# Patient Record
Sex: FEMALE | Race: WHITE | NOT HISPANIC OR LATINO | Employment: FULL TIME | URBAN - METROPOLITAN AREA
[De-identification: names, ages, dates, MRNs, and addresses within clinical notes are randomized per-mention and may not be internally consistent; named-entity substitution may affect disease eponyms.]

---

## 2017-01-24 ENCOUNTER — OFFICE VISIT (OUTPATIENT)
Dept: ENDOCRINOLOGY | Age: 67
End: 2017-01-24

## 2017-01-24 VITALS
DIASTOLIC BLOOD PRESSURE: 72 MMHG | OXYGEN SATURATION: 94 % | WEIGHT: 247 LBS | SYSTOLIC BLOOD PRESSURE: 130 MMHG | HEIGHT: 63 IN | HEART RATE: 83 BPM | BODY MASS INDEX: 43.77 KG/M2

## 2017-01-24 DIAGNOSIS — R63.5 ABNORMAL WEIGHT GAIN: ICD-10-CM

## 2017-01-24 DIAGNOSIS — E04.1 THYROID NODULE: ICD-10-CM

## 2017-01-24 DIAGNOSIS — R53.82 CHRONIC FATIGUE: ICD-10-CM

## 2017-01-24 DIAGNOSIS — E05.90 HYPERTHYROIDISM: Primary | ICD-10-CM

## 2017-01-24 PROCEDURE — 99214 OFFICE O/P EST MOD 30 MIN: CPT | Performed by: INTERNAL MEDICINE

## 2017-01-24 RX ORDER — METOPROLOL TARTRATE 50 MG/1
50 TABLET, FILM COATED ORAL 2 TIMES DAILY
COMMUNITY
Start: 2016-12-28

## 2017-01-24 NOTE — PROGRESS NOTES
66 y.o.    Patient Care Team:  KAE Leroy as PCP - General (Physician Assistant)    Chief Complaint:      F/U HYPERTHYROIDISM.  NO RECENT LABS.      Subjective     HPI  Patient is a 66-year-old white female with a history of hyperthyroidism and thyroid nodule and uncontrolled type 2 diabetes mellitus came for follow-up  Patient is currently taking Tapazole 5 mg daily in the morning.  She denies any side effects.  She reports compliance with the medication.  Uncontrolled type 2 diabetes mellitus  Patient did not bring her Accu-Chek log today but her meter suggest that her 7 day average was 151 and 30 day average was 162.  She is currently taking Lantus 34 units twice daily and Glucovance 2 tablets twice daily.  Abnormal weight gain  Patient actually lost about 6 pounds since September.  She currently weighs 247 pounds with a BMI of 43.8  History of atrial fibrillation currently has chronology follow-up  Chronic fatigue  Patient has been expressing chronic fatigue for a long time.  She reports that her fatigue is getting worse  Type 2 diabetes mellitus uncontrolled with neuropathy.  Patient reports bilateral carpal tunnel symptoms which could be from diabetes and also obesity and abnormal thyroid status    Interval History  patient is a 65-year-old white female with a history of hyperthyroidism came for follow-up  Summary  Patient was initially seen by me at the [facility] when she was admitted with tachycardia and abnormal thyroid function testing. Her TSH was suppressed <0.005 and T4 was elevated at 17. Initial evaluation suggested that she had moderate sized goiter with one single nodule in the left lobe and she was initiated on Tapazole 20 mg 3 times a day prior to discharge  Patient reports several symptoms suggestive of hyperthyroidism  Fatigue  Since starting medication patient reports that she is slightly feeling better. She is able to climb a full step of stairs without feeling shortness  of breath or exhaustion.  Atrial fibrillation  Patient is currently managed by cardiology and is under control.  She is apparently using an event recorder which started just for the past 3 days for 1 month. She of course denies any palpitations.  Her energy level has increased her activity level has also increased pressure continues to feel fatigue occasionally. She denied any tremors  She denied any side effects from medication.  Thyroiditis was suspected but her thyroid peroxidase antibodies were negative during the hospitalization.  patient also has history of type 2 diabetes mellitus which appear to be in good control with an A1c of 6.4%  Patient continues to feel symptoms of heat intolerance and sweating and leg cramps. She also reports worsening fatigue for the past few months.   Hyperthyroidism: The patient is being seen for follow-up of hyperthyroidism. Disease type: undetermined. Current treatment includes methimazole. Symptoms: fatigue, heat intolerance and increased perspiration, but no palpitations, no weight loss, no weight gain, no cold intolerance, no tremor, no irritability, no diarrhea, no constipation, no pruritus and no peripheral edema.           Patient is also known diabetic and has uncontrolled type 2 diabetes mellitus.  She does not check her blood sugars frequently  She reports seeing 200s in the past  Her last eye examination was 2 years ago but she has no background diabetic retinopathy  She reports heat intolerance and feeling sleepy all the time.  She does report symptoms of tingling and numbness in both hands suggestive of carpal tunnel syndrome       The following portions of the patient's history were reviewed and updated as appropriate: allergies, current medications, past family history, past medical history, past social history, past surgical history and problem list.    Past Medical History   Diagnosis Date   • Arthritis    • Depression    • GERD (gastroesophageal reflux disease)     • Headache    • Sleep apnea    • Type 2 diabetes mellitus      Family History   Problem Relation Age of Onset   • Cancer Other    • Diabetes Other    • Glaucoma Other    • Rheum arthritis Other    • Kidney disease Other      Social History     Social History   • Marital status:      Spouse name: N/A   • Number of children: N/A   • Years of education: N/A     Occupational History   • Not on file.     Social History Main Topics   • Smoking status: Never Smoker   • Smokeless tobacco: Not on file   • Alcohol use No   • Drug use: Not on file   • Sexual activity: Not on file     Other Topics Concern   • Not on file     Social History Narrative     Allergies   Allergen Reactions   • Other        Current Outpatient Prescriptions:   •  citalopram (CeleXA) 20 MG tablet, Take 2 tablets by mouth 2 (two) times a day., Disp: , Rfl:   •  furosemide (LASIX) 40 MG tablet, Take 1 tablet by mouth daily., Disp: , Rfl:   •  glyBURIDE-metFORMIN (GLUCOVANCE) 5-500 MG per tablet, Take 2 tablets by mouth 2 (two) times a day., Disp: , Rfl:   •  Insulin Glargine 100 UNIT/ML solution pen-injector, Inject 58 Units under the skin nightly., Disp: , Rfl:   •  methIMAzole (TAPAZOLE) 5 MG tablet, TAKE ONE TABLET BY MOUTH DAILY, Disp: 90 tablet, Rfl: 0  •  naproxen (NAPROSYN) 500 MG tablet, Take 1 tablet by mouth 2 (two) times a day., Disp: , Rfl:   •  pantoprazole (PROTONIX) 40 MG EC tablet, Take 1 tablet by mouth daily., Disp: , Rfl:   •  rivaroxaban (XARELTO) tablet, Take 1 tablet by mouth daily., Disp: , Rfl:         Review of Systems   Constitutional: Negative for chills, fatigue and fever.   Cardiovascular: Negative for chest pain and palpitations.   Gastrointestinal: Negative for abdominal pain, constipation, diarrhea, nausea and vomiting.   Endocrine: Positive for heat intolerance. Negative for cold intolerance.   All other systems reviewed and are negative.      Objective       Vitals:    01/24/17 1129   BP: 130/72   Pulse: 83  "  SpO2: 94%   Weight: 247 lb (112 kg)   Height: 63\" (160 cm)     Body mass index is 43.75 kg/(m^2).      Physical Exam   Constitutional: She is oriented to person, place, and time. She appears well-developed and well-nourished.   Morbidly obese   HENT:   Head: Normocephalic and atraumatic.   Eyes: EOM are normal. Pupils are equal, round, and reactive to light.   Neck: Normal range of motion. Neck supple. Thyromegaly present.   Cardiovascular: Normal rate, regular rhythm, normal heart sounds and intact distal pulses.    Pulmonary/Chest: Effort normal and breath sounds normal.   Abdominal: Soft. Bowel sounds are normal. She exhibits distension. There is no tenderness.   Musculoskeletal: Normal range of motion. She exhibits no edema.   Lymphadenopathy:     She has no cervical adenopathy.   Neurological: She is alert and oriented to person, place, and time.   Skin: Skin is warm.   Psychiatric: She has a normal mood and affect. Her behavior is normal.   Nursing note and vitals reviewed.    Results Review:     I reviewed the patient's new clinical results.    Medical records reviewed  Summary:      Lab on 08/31/2016   Component Date Value Ref Range Status   • Glucose 08/31/2016 109* 65 - 99 mg/dL Final   • BUN 08/31/2016 21  8 - 27 mg/dL Final   • Creatinine 08/31/2016 0.94  0.57 - 1.00 mg/dL Final   • eGFR Non  Am 08/31/2016 64  >59 mL/min/1.73 Final   • eGFR African Am 08/31/2016 74  >59 mL/min/1.73 Final   • BUN/Creatinine Ratio 08/31/2016 22  11 - 26 Final   • Sodium 08/31/2016 142  134 - 144 mmol/L Final   • Potassium 08/31/2016 4.8  3.5 - 5.2 mmol/L Final   • Chloride 08/31/2016 104  97 - 108 mmol/L Final   • Total CO2 08/31/2016 22  18 - 29 mmol/L Final   • Calcium 08/31/2016 9.0  8.7 - 10.3 mg/dL Final   • Total Protein 08/31/2016 6.4  6.0 - 8.5 g/dL Final   • Albumin 08/31/2016 3.7  3.6 - 4.8 g/dL Final   • Globulin 08/31/2016 2.7  1.5 - 4.5 g/dL Final   • A/G Ratio 08/31/2016 1.4  1.1 - 2.5 Final   • " Total Bilirubin 08/31/2016 0.2  0.0 - 1.2 mg/dL Final   • Alkaline Phosphatase 08/31/2016 86  39 - 117 IU/L Final   • AST (SGOT) 08/31/2016 22  0 - 40 IU/L Final   • ALT (SGPT) 08/31/2016 24  0 - 32 IU/L Final   • Hemoglobin A1C 08/31/2016 6.9* 4.8 - 5.6 % Final    Comment:          Pre-diabetes: 5.7 - 6.4           Diabetes: >6.4           Glycemic control for adults with diabetes: <7.0     • Free T4 08/31/2016 1.27  0.82 - 1.77 ng/dL Final   • TSH 08/31/2016 2.100  0.450 - 4.500 uIU/mL Final   • WBC 08/31/2016 7.6  3.4 - 10.8 x10E3/uL Final   • RBC 08/31/2016 3.59* 3.77 - 5.28 x10E6/uL Final   • Hemoglobin 08/31/2016 11.3  11.1 - 15.9 g/dL Final   • Hematocrit 08/31/2016 33.8* 34.0 - 46.6 % Final   • MCV 08/31/2016 94  79 - 97 fL Final   • MCH 08/31/2016 31.5  26.6 - 33.0 pg Final   • MCHC 08/31/2016 33.4  31.5 - 35.7 g/dL Final   • RDW 08/31/2016 14.4  12.3 - 15.4 % Final   • Platelets 08/31/2016 252  150 - 379 x10E3/uL Final   • Neutrophil Rel % 08/31/2016 59  % Final   • Lymphocyte Rel % 08/31/2016 28  % Final   • Monocyte Rel % 08/31/2016 7  % Final   • Eosinophil Rel % 08/31/2016 5  % Final   • Basophil Rel % 08/31/2016 1  % Final   • Neutrophils Absolute 08/31/2016 4.5  1.4 - 7.0 x10E3/uL Final   • Lymphocytes Absolute 08/31/2016 2.1  0.7 - 3.1 x10E3/uL Final   • Monocytes Absolute 08/31/2016 0.5  0.1 - 0.9 x10E3/uL Final   • Eosinophils Absolute 08/31/2016 0.3  0.0 - 0.4 x10E3/uL Final   • Basophils Absolute 08/31/2016 0.1  0.0 - 0.2 x10E3/uL Final   • Immature Granulocyte Rel % 08/31/2016 0  % Final   • Immature Grans Absolute 08/31/2016 0.0  0.0 - 0.1 x10E3/uL Final   • Hematology Comments: 08/31/2016 Note:   Final    Verified by microscopic examination.     Lab Results   Component Value Date    HGBA1C 6.9 (H) 08/31/2016    HGBA1C 7.24 (H) 05/13/2016     Lab Results   Component Value Date    MICROALBUR <3.0 05/13/2016    CREATININE 0.94 08/31/2016     Imaging Results (most recent)     None                 Assessment and Plan:    Lisa was seen today for hyperthyroidism.    Diagnoses and all orders for this visit:    Hyperthyroidism  -     Comprehensive Metabolic Panel  -     CBC & Differential  -     TSH  -     T4, Free  -     T3    Thyroid nodule  -     Comprehensive Metabolic Panel  -     CBC & Differential  -     TSH  -     T4, Free  -     T3    Chronic fatigue  -     Comprehensive Metabolic Panel  -     CBC & Differential  -     TSH  -     T4, Free  -     T3    Abnormal weight gain  -     Comprehensive Metabolic Panel  -     CBC & Differential  -     TSH  -     T4, Free  -     T3        1 hyperthyroidism  2 goiter  3 single nodule  4 uncontrolled type 2 diabetes mellitus  5 fatigue WORSENING  6 history of atrial fibrillation.      Patient lost 6 pounds since September 2016  She reports that her fatigue is worsening  She has no difficulty swallowing or change in voice    Patient is currently taking methimazole 5 mg 1 tablet daily  She denies any side effects on the medication  I briefly discussed the radioactive iodine ablation versus surgery with the patient and her   She will be a good candidate for surgery due to the nodule but patient refused to consider surgery at this time    Glucometer download reviewed with the patient  7 day average was 151 and 30 day average is 162  Patient is currently taking Lantus 34 units twice daily but she does not check her Accu-Cheks frequently enough    Patient will get lab testing done today  She will return to follow-up in 6 months.    The total time spent for old record and lab review and face- to- face was more than 25 min of which greater than 50% of time was spent on counseling the patient on recommended evaluation and treatment options, instructions for management/treatment and /or follow up  and importance of compliance with chosen management or treatment options       Raúl Landry MD. FACE    01/24/17      EMR Dragon / transcription  disclaimer:    Much of this encounter note is an electronic transcription/ translation of spoken language to printed text.  Electronic translation of spoken language may permit erroneous, or at times, nonsensical words or phrases to be inadvertently transcribed; although I have reviewed the note for such errors, some may still exist.

## 2017-01-24 NOTE — MR AVS SNAPSHOT
Lisa Gavin   1/24/2017 11:15 AM   Office Visit    Dept Phone:  [phone]   Encounter #:  [id number]    Provider:  Raúl DOMINGO MD   Department:  [facility] ENDOCRINOLOGY                Your Full Care Plan              Your Updated Medication List          This list is accurate as of: 1/24/17 11:54 AM.  Always use your most recent med list.                citalopram 20 MG tablet   Commonly known as:  CeleXA       furosemide 40 MG tablet   Commonly known as:  LASIX       glyBURIDE-metFORMIN 5-500 MG per tablet   Commonly known as:  GLUCOVANCE       Insulin Glargine 100 UNIT/ML injection pen   Commonly known as:  LANTUS SOLOSTAR       methIMAzole 5 MG tablet   Commonly known as:  TAPAZOLE   TAKE ONE TABLET BY MOUTH DAILY       metoprolol tartrate 50 MG tablet   Commonly known as:  LOPRESSOR       naproxen 500 MG tablet   Commonly known as:  NAPROSYN       pantoprazole 40 MG EC tablet   Commonly known as:  PROTONIX       rivaroxaban 20 MG tablet   Commonly known as:  XARELTO               We Performed the Following     CBC & Differential     Comprehensive Metabolic Panel     T3     T4, Free     TSH       You Were Diagnosed With        Codes Comments    Hyperthyroidism    -  Primary ICD-10-CM: E05.90  ICD-9-CM: 242.90     Thyroid nodule     ICD-10-CM: E04.1  ICD-9-CM: 241.0     Chronic fatigue     ICD-10-CM: R53.82  ICD-9-CM: 780.79     Abnormal weight gain     ICD-10-CM: R63.5  ICD-9-CM: 783.1       Instructions     None    Patient Instructions History      Upcoming Appointments     Visit Type Date Time Department    OFFICE VISIT 1/24/2017 11:15 AM [facility]    OFFICE VISIT 7/24/2017 10:15 AM [facility]      Therapydiat Signup     [facility] allows you to send messages to your doctor, view your test results, renew your prescriptions, schedule appointments, and more. To sign up, go to [employer] and click on the Sign  "Up Now link in the New User? box. Enter your [employer] Activation Code exactly as it appears below along with the last four digits of your Social Security Number and your Date of Birth () to complete the sign-up process. If you do not sign up before the expiration date, you must request a new code.    [employer] Activation Code: [email or web address]  Expires: 2017 11:53 AM    If you have questions, you can email [email or web address] or call [phone] to talk to our [employer] staff. Remember, [employer] is NOT to be used for urgent needs. For medical emergencies, dial 911.               Other Info from Your Visit           Your Appointments     2017 10:15 AM EDT   Office Visit with aRúl DOMINGO MD   [facility] ENDOCRINOLOGY (--)    [address] [place] [id number] [phone]           Arrive 15 minutes prior to appointment.              Allergies     Other        Reason for Visit     Hyperthyroidism           Vital Signs     Blood Pressure Pulse Height Weight Oxygen Saturation Body Mass Index    130/72 83 63\" (160 cm) 247 lb (112 kg) 94% 43.75 kg/m2    Smoking Status                   Never Smoker           Problems and Diagnoses Noted     Abnormal weight gain    Tiredness    Hyperthyroidism    Thyroid nodule        "

## 2017-01-25 LAB
ALBUMIN SERPL-MCNC: 4.1 G/DL (ref 3.5–5.2)
ALBUMIN/GLOB SERPL: 1.4 G/DL
ALP SERPL-CCNC: 81 U/L (ref 39–117)
ALT SERPL-CCNC: 28 U/L (ref 1–33)
AST SERPL-CCNC: 31 U/L (ref 1–32)
BASOPHILS # BLD AUTO: 0.03 10*3/MM3 (ref 0–0.2)
BASOPHILS NFR BLD AUTO: 0.4 % (ref 0–1.5)
BILIRUB SERPL-MCNC: 0.3 MG/DL (ref 0.1–1.2)
BUN SERPL-MCNC: 20 MG/DL (ref 8–23)
BUN/CREAT SERPL: 20.2 (ref 7–25)
CALCIUM SERPL-MCNC: 9.2 MG/DL (ref 8.6–10.5)
CHLORIDE SERPL-SCNC: 104 MMOL/L (ref 98–107)
CO2 SERPL-SCNC: 23.3 MMOL/L (ref 22–29)
CREAT SERPL-MCNC: 0.99 MG/DL (ref 0.57–1)
EOSINOPHIL # BLD AUTO: 0.29 10*3/MM3 (ref 0–0.7)
EOSINOPHIL NFR BLD AUTO: 4.1 % (ref 0.3–6.2)
ERYTHROCYTE [DISTWIDTH] IN BLOOD BY AUTOMATED COUNT: 14.7 % (ref 11.7–13)
GLOBULIN SER CALC-MCNC: 3 GM/DL
GLUCOSE SERPL-MCNC: 86 MG/DL (ref 65–99)
HCT VFR BLD AUTO: 35.3 % (ref 35.6–45.5)
HGB BLD-MCNC: 11.4 G/DL (ref 11.9–15.5)
IMM GRANULOCYTES # BLD: 0.04 10*3/MM3 (ref 0–0.03)
IMM GRANULOCYTES NFR BLD: 0.6 % (ref 0–0.5)
LYMPHOCYTES # BLD AUTO: 1.93 10*3/MM3 (ref 0.9–4.8)
LYMPHOCYTES NFR BLD AUTO: 27.1 % (ref 19.6–45.3)
MCH RBC QN AUTO: 32.2 PG (ref 26.9–32)
MCHC RBC AUTO-ENTMCNC: 32.3 G/DL (ref 32.4–36.3)
MCV RBC AUTO: 99.7 FL (ref 80.5–98.2)
MONOCYTES # BLD AUTO: 0.49 10*3/MM3 (ref 0.2–1.2)
MONOCYTES NFR BLD AUTO: 6.9 % (ref 5–12)
NEUTROPHILS # BLD AUTO: 4.34 10*3/MM3 (ref 1.9–8.1)
NEUTROPHILS NFR BLD AUTO: 60.9 % (ref 42.7–76)
PLATELET # BLD AUTO: 241 10*3/MM3 (ref 140–500)
POTASSIUM SERPL-SCNC: 4.3 MMOL/L (ref 3.5–5.2)
PROT SERPL-MCNC: 7.1 G/DL (ref 6–8.5)
RBC # BLD AUTO: 3.54 10*6/MM3 (ref 3.9–5.2)
SODIUM SERPL-SCNC: 143 MMOL/L (ref 136–145)
T3 SERPL-MCNC: 99.4 NG/DL (ref 80–200)
T4 FREE SERPL-MCNC: 1.22 NG/DL (ref 0.93–1.7)
TSH SERPL DL<=0.005 MIU/L-ACNC: 1.58 MIU/ML (ref 0.27–4.2)
WBC # BLD AUTO: 7.12 10*3/MM3 (ref 4.5–10.7)

## 2017-03-28 RX ORDER — METHIMAZOLE 5 MG/1
TABLET ORAL
Qty: 90 TABLET | Refills: 0 | Status: SHIPPED | OUTPATIENT
Start: 2017-03-28 | End: 2017-07-02 | Stop reason: SDUPTHER

## 2017-05-03 ENCOUNTER — TELEPHONE (OUTPATIENT)
Dept: ENDOCRINOLOGY | Age: 67
End: 2017-05-03

## 2017-05-03 DIAGNOSIS — E11.8 UNCONTROLLED TYPE 2 DIABETES MELLITUS WITH COMPLICATION, UNSPECIFIED LONG TERM INSULIN USE STATUS: ICD-10-CM

## 2017-05-03 DIAGNOSIS — E04.1 THYROID NODULE: ICD-10-CM

## 2017-05-03 DIAGNOSIS — E05.90 HYPERTHYROIDISM: ICD-10-CM

## 2017-05-03 DIAGNOSIS — E11.65 UNCONTROLLED TYPE 2 DIABETES MELLITUS WITH COMPLICATION, UNSPECIFIED LONG TERM INSULIN USE STATUS: ICD-10-CM

## 2017-05-03 DIAGNOSIS — E04.1 THYROID NODULE: Primary | ICD-10-CM

## 2017-07-03 RX ORDER — METHIMAZOLE 5 MG/1
TABLET ORAL
Qty: 30 TABLET | Refills: 0 | Status: SHIPPED | OUTPATIENT
Start: 2017-07-03 | End: 2017-08-18 | Stop reason: SDUPTHER

## 2017-08-18 RX ORDER — METHIMAZOLE 5 MG/1
TABLET ORAL
Qty: 30 TABLET | Refills: 0 | Status: SHIPPED | OUTPATIENT
Start: 2017-08-18 | End: 2017-09-17 | Stop reason: SDUPTHER

## 2017-09-18 RX ORDER — METHIMAZOLE 5 MG/1
TABLET ORAL
Qty: 30 TABLET | Refills: 0 | Status: SHIPPED | OUTPATIENT
Start: 2017-09-18 | End: 2017-10-29 | Stop reason: SDUPTHER

## 2017-10-30 RX ORDER — METHIMAZOLE 5 MG/1
TABLET ORAL
Qty: 30 TABLET | Refills: 0 | Status: SHIPPED | OUTPATIENT
Start: 2017-10-30 | End: 2018-01-02 | Stop reason: SDUPTHER

## 2018-01-02 RX ORDER — METHIMAZOLE 5 MG/1
TABLET ORAL
Qty: 30 TABLET | Refills: 0 | Status: SHIPPED | OUTPATIENT
Start: 2018-01-02 | End: 2018-02-27 | Stop reason: SDUPTHER

## 2018-02-27 RX ORDER — METHIMAZOLE 5 MG/1
TABLET ORAL
Qty: 30 TABLET | Refills: 0 | Status: SHIPPED | OUTPATIENT
Start: 2018-02-27 | End: 2018-03-27 | Stop reason: SDUPTHER

## 2018-03-28 RX ORDER — METHIMAZOLE 5 MG/1
TABLET ORAL
Qty: 30 TABLET | Refills: 0 | Status: SHIPPED | OUTPATIENT
Start: 2018-03-28

## 2018-04-23 RX ORDER — METHIMAZOLE 5 MG/1
TABLET ORAL
Qty: 30 TABLET | Refills: 0 | OUTPATIENT
Start: 2018-04-23

## 2019-09-22 ENCOUNTER — APPOINTMENT (OUTPATIENT)
Dept: GENERAL RADIOLOGY | Facility: HOSPITAL | Age: 69
End: 2019-09-22

## 2019-09-22 ENCOUNTER — HOSPITAL ENCOUNTER (EMERGENCY)
Facility: HOSPITAL | Age: 69
Discharge: HOME | End: 2019-09-22
Attending: EMERGENCY MEDICINE | Admitting: EMERGENCY MEDICINE

## 2019-09-22 VITALS
RESPIRATION RATE: 18 BRPM | WEIGHT: 213 LBS | DIASTOLIC BLOOD PRESSURE: 74 MMHG | HEART RATE: 72 BPM | HEIGHT: 63 IN | OXYGEN SATURATION: 98 % | BODY MASS INDEX: 37.74 KG/M2 | TEMPERATURE: 98.1 F | SYSTOLIC BLOOD PRESSURE: 165 MMHG

## 2019-09-22 DIAGNOSIS — S33.5XXA LUMBAR SPRAIN, INITIAL ENCOUNTER: ICD-10-CM

## 2019-09-22 DIAGNOSIS — S83.92XA SPRAIN OF LEFT KNEE, UNSPECIFIED LIGAMENT, INITIAL ENCOUNTER: ICD-10-CM
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
## 2019-09-22 PROCEDURE — 73562 X-RAY EXAM OF KNEE 3: CPT

## 2019-09-22 PROCEDURE — 99282 EMERGENCY DEPT VISIT SF MDM: CPT | Performed by: EMERGENCY MEDICINE

## 2019-09-22 PROCEDURE — 99283 EMERGENCY DEPT VISIT LOW MDM: CPT

## 2019-09-22 PROCEDURE — 72100 X-RAY EXAM L-S SPINE 2/3 VWS: CPT

## 2019-09-22 RX ORDER — TRAMADOL HYDROCHLORIDE 50 MG/1
50 TABLET ORAL EVERY 6 HOURS PRN
Qty: 30 TABLET | Refills: 0 | Status: ON HOLD | OUTPATIENT
Start: 2019-09-22 | End: 2019-10-08

## 2019-09-22 NOTE — ED NOTES
Knee brace was applied to left knee and patient was fitted for crutches. Pt was taught and demonstrated crutch training.      Yumiko Chen  09/22/19 [id number]

## 2019-09-22 NOTE — ED PROVIDER NOTES
Subjective   History of Present Illness  History of Present Illness    Chief complaint: Fall with knee pain    Location: Left knee    Quality/Severity: Moderate, sharp    Timing/Onset/Duration: Acute onset at noon    Modifying Factors: It hurts to bear weight, feels better to remain still    Associated Symptoms: No headache.  No neck pain.  The patient complains of low back pain radiating to the right flank.  No chest pain or shortness of breath.  No abdominal pain.  No numbness, tingling, weakness, or change in bladder bowel function.  No loss of consciousness.    Narrative: This 68-year-old white female fell at [employer] at noon.  The patient's granddaughter jumped on her back and she fell.  She did not hit her head.  She complains of low back pain radiating to the right flank and left knee pain.  The patient is on Xarelto.    PCP:  Saira          Review of Systems   Constitutional: Positive for activity change.   HENT: Negative for nosebleeds and rhinorrhea.    Eyes: Negative for visual disturbance.   Respiratory: Negative for shortness of breath.    Cardiovascular: Negative for chest pain.   Gastrointestinal: Negative for abdominal pain, nausea and vomiting.   Genitourinary: Negative for difficulty urinating.   Musculoskeletal: Positive for back pain. Negative for neck pain.        Left knee pain   Neurological: Negative for weakness, numbness and headaches.   Psychiatric/Behavioral: Negative for confusion.        Medication List      ASK your doctor about these medications    citalopram 20 MG tablet  Commonly known as:  CeleXA     furosemide 40 MG tablet  Commonly known as:  LASIX     glyBURIDE-metFORMIN 5-500 MG per tablet  Commonly known as:  GLUCOVANCE     Insulin Glargine 100 UNIT/ML injection pen  Commonly known as:  LANTUS SOLOSTAR     methIMAzole 5 MG tablet  Commonly known as:  TAPAZOLE  TAKE ONE TABLET BY MOUTH DAILY     metoprolol tartrate 50 MG tablet  Commonly known as:  LOPRESSOR     naproxen 500 MG  tablet  Commonly known as:  NAPROSYN     pantoprazole 40 MG EC tablet  Commonly known as:  PROTONIX     rivaroxaban 20 MG tablet  Commonly known as:  XARELTO          Past Medical History:   Diagnosis Date   • Arthritis    • Depression    • GERD (gastroesophageal reflux disease)    • Headache    • Sleep apnea    • Type 2 diabetes mellitus (CMS/HCC)        Allergies   Allergen Reactions   • Other        Past Surgical History:   Procedure Laterality Date   • CHOLECYSTECTOMY     • HYSTERECTOMY     • ROTATOR CUFF REPAIR      ACUTE       Family History   Problem Relation Age of Onset   • Cancer Other    • Diabetes Other    • Glaucoma Other    • Rheum arthritis Other    • Kidney disease Other        Social History     Socioeconomic History   • Marital status:      Spouse name: Not on file   • Number of children: Not on file   • Years of education: Not on file   • Highest education level: Not on file   Tobacco Use   • Smoking status: Never Smoker   Substance and Sexual Activity   • Alcohol use: No           Objective   Physical Exam   Constitutional: She is oriented to person, place, and time. She appears well-developed and well-nourished. No distress.   ED Triage Vitals (09/22/19 1407)  Temp: 98.1 °F (36.7 °C)  Heart Rate: 72  Resp: 18  BP: 165/74  SpO2: 98 %  Temp src: Oral  Heart Rate Source: Monitor  Patient Position: Lying  BP Location: Right arm  FiO2 (%): n/a    The patient's vitals were reviewed by me.  Unless otherwise noted they are within normal limits.  The patient is hypertensive with blood pressure 165/74.  She has a history of hypertension     HENT:   Head: Normocephalic and atraumatic.   Right Ear: External ear normal.   Left Ear: External ear normal.   Nose: Nose normal.   Mouth/Throat: Oropharynx is clear and moist.   Eyes: Conjunctivae and EOM are normal. Pupils are equal, round, and reactive to light. Right eye exhibits no discharge. Left eye exhibits no discharge. No scleral icterus.   Neck:  Normal range of motion. Neck supple. No JVD present. No tracheal deviation present. No thyromegaly present.   There is no tenderness, deformity, or bony step-offs upon palpation of the cervical and thoracic spine.    There is minimal tenderness in the midline lumbar spine and right flank.  There is no bony step-offs or deformity.    There is no bony step-offs, tenderness, or deformity upon palpation of the sacrococcygeal spine.   Cardiovascular: Normal rate, regular rhythm, normal heart sounds and intact distal pulses. Exam reveals no gallop and no friction rub.   No murmur heard.  Pulmonary/Chest: Effort normal and breath sounds normal. No stridor. No respiratory distress. She has no wheezes. She has no rales. She exhibits no tenderness.   Abdominal: Soft. Bowel sounds are normal. She exhibits no distension and no mass. There is no tenderness. There is no rebound and no guarding. No hernia.   Musculoskeletal: Normal range of motion. She exhibits no edema or deformity.   There is posterior left knee tenderness with tenderness upon posterior drawer sign.  No joint laxity noted.  The capillary refill is less than 2 seconds.  There is a 2+ dorsalis pedis and posterior tibial pulse.  The sensation is intact.  There is normal strength.   Lymphadenopathy:     She has no cervical adenopathy.   Neurological: She is alert and oriented to person, place, and time.   Skin: Skin is warm and dry. No rash noted. She is not diaphoretic. No erythema. No pallor.   Psychiatric: Her behavior is normal.   Nursing note and vitals reviewed.      Procedures           ED Course      3:39 PM, 09/22/19:  The patient was reassessed.  She has no new complaints.  Her vital signs were reviewed and are stable.  Neurological exam: Alert and oriented x4 with no focal deficits noted.  Abdominal exam: Soft nontender no masses positive bowel sounds.  The patient has no headache.    3:43 PM, 09/22/19:  The patient had a left knee immobilizer placed by  the technician.  After application of the knee immobilizer it was assessed by me and noted to be in good position with the left lower extremity being neurovascularly intact.  The patient was fitted for crutches and given crutch instructions by the technician.      3:39 PM, 09/22/19:  The patient's diagnosis of fall with left knee sprain and lumbar sprain was discussed with her.  The patient should ice the left knee and back for 20 minutes every 2 hours while she is awake for 2 to 3 days.  She should elevate it.  The patient will be given a prescription for Ultram for pain.  The patient should not bear weight on the left knee for 2 days then advance as tolerated.  Follow-up with Dr. Egan in 1 week for your knee sprain and Dr. Chambers in 1 week for your lumbar sprain.  Return to emergency department there is increased pain, numbness, tingling, weakness, change in color or temperature, change in bladder bowel function, headache, worse in any way at all.  All the patient's questions were answered she will be discharged in good condition.    3:54 PM, 09/22/19:  The patient complains of a mild headache.  She attributes this to the fact that she has not eaten all day.  The risks versus the benefits of obtaining a CT of the head were discussed with her.  She does not want a head CT at this time.  She understands that if her headache worsens she should return immediately to the emergency department for further work-up and evaluation.            MDM    Final diagnoses:   Fall, initial encounter   Lumbar sprain, initial encounter   Sprain of left knee, unspecified ligament, initial encounter              Chip Gomez MD  09/22/19 [id number]       Chip Gomez MD  09/22/19 [id number]

## 2019-09-22 NOTE — DISCHARGE INSTRUCTIONS
Nonweightbearing left leg for 2 days, then advance weightbearing as tolerated.  Follow-up with Dr. Chávez in 1 week for your lumbar sprain and Dr. Egan in 1 week for your knee sprain.  Return to the emergency department if there is increased pain, numbness, tingling, weakness, change in color or temperature, headache, worse in any way at all.

## 2019-10-07 ENCOUNTER — APPOINTMENT (OUTPATIENT)
Dept: GENERAL RADIOLOGY | Facility: HOSPITAL | Age: 69
End: 2019-10-07

## 2019-10-07 ENCOUNTER — APPOINTMENT (OUTPATIENT)
Dept: CT IMAGING | Facility: HOSPITAL | Age: 69
End: 2019-10-07

## 2019-10-07 ENCOUNTER — HOSPITAL ENCOUNTER (OUTPATIENT)
Facility: HOSPITAL | Age: 69
Setting detail: OBSERVATION
Discharge: HOME HEALTH | End: 2019-10-10
Attending: EMERGENCY MEDICINE | Admitting: HOSPITALIST
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
## 2019-10-07 DIAGNOSIS — S76.311A RIGHT HAMSTRING STRAIN, INITIAL ENCOUNTER: ICD-10-CM

## 2019-10-07 PROCEDURE — 99284 EMERGENCY DEPT VISIT MOD MDM: CPT | Performed by: EMERGENCY MEDICINE

## 2019-10-07 PROCEDURE — 99284 EMERGENCY DEPT VISIT MOD MDM: CPT

## 2019-10-07 PROCEDURE — 96374 THER/PROPH/DIAG INJ IV PUSH: CPT

## 2019-10-07 PROCEDURE — 96375 TX/PRO/DX INJ NEW DRUG ADDON: CPT

## 2019-10-07 PROCEDURE — 70450 CT HEAD/BRAIN W/O DYE: CPT

## 2019-10-07 PROCEDURE — 96376 TX/PRO/DX INJ SAME DRUG ADON: CPT

## 2019-10-07 PROCEDURE — 73502 X-RAY EXAM HIP UNI 2-3 VIEWS: CPT

## 2019-10-08 ENCOUNTER — APPOINTMENT (OUTPATIENT)
Dept: GENERAL RADIOLOGY | Facility: HOSPITAL | Age: 69
End: 2019-10-08

## 2019-10-08 PROBLEM — R52 INADEQUATE PAIN CONTROL: Status: ACTIVE | Noted: 2019-10-08

## 2019-10-08 PROBLEM — E03.9 HYPOTHYROIDISM (ACQUIRED): Chronic | Status: ACTIVE | Noted: 2019-10-08
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
## 2019-10-08 PROBLEM — M25.551 POSTERIOR PAIN OF RIGHT HIP: Status: ACTIVE | Noted: 2019-10-08
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
## 2019-10-08 LAB
ALBUMIN SERPL-MCNC: 3.7 G/DL (ref 3.5–5.2)
ALBUMIN/GLOB SERPL: 1.2 G/DL
ALP SERPL-CCNC: 83 U/L (ref 39–117)
ALT SERPL W P-5'-P-CCNC: 23 U/L (ref 1–33)
ANION GAP SERPL CALCULATED.3IONS-SCNC: 11.3 MMOL/L (ref 5–15)
ANION GAP SERPL CALCULATED.3IONS-SCNC: 11.3 MMOL/L (ref 5–15)
AST SERPL-CCNC: 24 U/L (ref 1–32)
BASOPHILS # BLD AUTO: 0.03 10*3/MM3 (ref 0–0.2)
BASOPHILS # BLD AUTO: 0.04 10*3/MM3 (ref 0–0.2)
BASOPHILS NFR BLD AUTO: 0.4 % (ref 0–1.5)
BASOPHILS NFR BLD AUTO: 0.5 % (ref 0–1.5)
BILIRUB SERPL-MCNC: 0.2 MG/DL (ref 0.2–1.2)
BUN BLD-MCNC: 22 MG/DL (ref 8–23)
BUN BLD-MCNC: 25 MG/DL (ref 8–23)
BUN/CREAT SERPL: 23.9 (ref 7–25)
BUN/CREAT SERPL: 24 (ref 7–25)
CALCIUM SPEC-SCNC: 9.6 MG/DL (ref 8.6–10.5)
CALCIUM SPEC-SCNC: 9.7 MG/DL (ref 8.6–10.5)
CHLORIDE SERPL-SCNC: 102 MMOL/L (ref 98–107)
CHLORIDE SERPL-SCNC: 103 MMOL/L (ref 98–107)
CO2 SERPL-SCNC: 24.7 MMOL/L (ref 22–29)
CO2 SERPL-SCNC: 24.7 MMOL/L (ref 22–29)
CREAT BLD-MCNC: 0.92 MG/DL (ref 0.57–1)
CREAT BLD-MCNC: 1.04 MG/DL (ref 0.57–1)
DEPRECATED RDW RBC AUTO: 46.4 FL (ref 37–54)
DEPRECATED RDW RBC AUTO: 47.2 FL (ref 37–54)
EOSINOPHIL # BLD AUTO: 0.25 10*3/MM3 (ref 0–0.4)
EOSINOPHIL # BLD AUTO: 0.28 10*3/MM3 (ref 0–0.4)
EOSINOPHIL NFR BLD AUTO: 3.2 % (ref 0.3–6.2)
EOSINOPHIL NFR BLD AUTO: 3.9 % (ref 0.3–6.2)
ERYTHROCYTE [DISTWIDTH] IN BLOOD BY AUTOMATED COUNT: 13.1 % (ref 12.3–15.4)
ERYTHROCYTE [DISTWIDTH] IN BLOOD BY AUTOMATED COUNT: 13.2 % (ref 12.3–15.4)
GFR SERPL CREATININE-BSD FRML MDRD: 53 ML/MIN/1.73
GFR SERPL CREATININE-BSD FRML MDRD: 61 ML/MIN/1.73
GLOBULIN UR ELPH-MCNC: 3.2 GM/DL
GLUCOSE BLD-MCNC: 170 MG/DL (ref 65–99)
GLUCOSE BLD-MCNC: 175 MG/DL (ref 65–99)
GLUCOSE BLDC GLUCOMTR-MCNC: 142 MG/DL (ref 70–130)
GLUCOSE BLDC GLUCOMTR-MCNC: 184 MG/DL (ref 70–130)
GLUCOSE BLDC GLUCOMTR-MCNC: 194 MG/DL (ref 70–130)
GLUCOSE BLDC GLUCOMTR-MCNC: 212 MG/DL (ref 70–130)
GLUCOSE BLDC GLUCOMTR-MCNC: 232 MG/DL (ref 70–130)
HBA1C MFR BLD: 7.1 % (ref 4.8–5.6)
HCT VFR BLD AUTO: 31.1 % (ref 34–46.6)
HCT VFR BLD AUTO: 31.3 % (ref 34–46.6)
HGB BLD-MCNC: 10.3 G/DL (ref 12–15.9)
HGB BLD-MCNC: 10.4 G/DL (ref 12–15.9)
IMM GRANULOCYTES # BLD AUTO: 0.04 10*3/MM3 (ref 0–0.05)
IMM GRANULOCYTES # BLD AUTO: 0.04 10*3/MM3 (ref 0–0.05)
IMM GRANULOCYTES NFR BLD AUTO: 0.5 % (ref 0–0.5)
IMM GRANULOCYTES NFR BLD AUTO: 0.6 % (ref 0–0.5)
LYMPHOCYTES # BLD AUTO: 1.89 10*3/MM3 (ref 0.7–3.1)
LYMPHOCYTES # BLD AUTO: 2.09 10*3/MM3 (ref 0.7–3.1)
LYMPHOCYTES NFR BLD AUTO: 26.1 % (ref 19.6–45.3)
LYMPHOCYTES NFR BLD AUTO: 26.8 % (ref 19.6–45.3)
MCH RBC QN AUTO: 31.9 PG (ref 26.6–33)
MCH RBC QN AUTO: 32.2 PG (ref 26.6–33)
MCHC RBC AUTO-ENTMCNC: 32.9 G/DL (ref 31.5–35.7)
MCHC RBC AUTO-ENTMCNC: 33.4 G/DL (ref 31.5–35.7)
MCV RBC AUTO: 96.3 FL (ref 79–97)
MCV RBC AUTO: 96.9 FL (ref 79–97)
MONOCYTES # BLD AUTO: 0.5 10*3/MM3 (ref 0.1–0.9)
MONOCYTES # BLD AUTO: 0.58 10*3/MM3 (ref 0.1–0.9)
MONOCYTES NFR BLD AUTO: 6.9 % (ref 5–12)
MONOCYTES NFR BLD AUTO: 7.4 % (ref 5–12)
NEUTROPHILS # BLD AUTO: 4.5 10*3/MM3 (ref 1.7–7)
NEUTROPHILS # BLD AUTO: 4.8 10*3/MM3 (ref 1.7–7)
NEUTROPHILS NFR BLD AUTO: 61.6 % (ref 42.7–76)
NEUTROPHILS NFR BLD AUTO: 62.1 % (ref 42.7–76)
NRBC BLD AUTO-RTO: 0 /100 WBC (ref 0–0.2)
NRBC BLD AUTO-RTO: 0 /100 WBC (ref 0–0.2)
PLATELET # BLD AUTO: 215 10*3/MM3 (ref 140–450)
PLATELET # BLD AUTO: 217 10*3/MM3 (ref 140–450)
PMV BLD AUTO: 11.1 FL (ref 6–12)
PMV BLD AUTO: 11.3 FL (ref 6–12)
POTASSIUM BLD-SCNC: 4.5 MMOL/L (ref 3.5–5.2)
POTASSIUM BLD-SCNC: 4.6 MMOL/L (ref 3.5–5.2)
PROT SERPL-MCNC: 6.9 G/DL (ref 6–8.5)
RBC # BLD AUTO: 3.23 10*6/MM3 (ref 3.77–5.28)
RBC # BLD AUTO: 3.23 10*6/MM3 (ref 3.77–5.28)
SODIUM BLD-SCNC: 138 MMOL/L (ref 136–145)
SODIUM BLD-SCNC: 139 MMOL/L (ref 136–145)
TSH SERPL DL<=0.05 MIU/L-ACNC: 4.62 UIU/ML (ref 0.27–4.2)
WBC NRBC COR # BLD: 7.24 10*3/MM3 (ref 3.4–10.8)
WBC NRBC COR # BLD: 7.8 10*3/MM3 (ref 3.4–10.8)

## 2019-10-08 PROCEDURE — 83036 HEMOGLOBIN GLYCOSYLATED A1C: CPT | Performed by: INTERNAL MEDICINE

## 2019-10-08 PROCEDURE — 96376 TX/PRO/DX INJ SAME DRUG ADON: CPT

## 2019-10-08 PROCEDURE — 25010000002 HYDROMORPHONE PER 4 MG: Performed by: INTERNAL MEDICINE

## 2019-10-08 PROCEDURE — 94660 CPAP INITIATION&MGMT: CPT

## 2019-10-08 PROCEDURE — 25010000002 HYDROMORPHONE PER 4 MG: Performed by: EMERGENCY MEDICINE

## 2019-10-08 PROCEDURE — 80053 COMPREHEN METABOLIC PANEL: CPT | Performed by: EMERGENCY MEDICINE

## 2019-10-08 PROCEDURE — 82962 GLUCOSE BLOOD TEST: CPT

## 2019-10-08 PROCEDURE — G0378 HOSPITAL OBSERVATION PER HR: HCPCS

## 2019-10-08 PROCEDURE — 94799 UNLISTED PULMONARY SVC/PX: CPT

## 2019-10-08 PROCEDURE — 96375 TX/PRO/DX INJ NEW DRUG ADDON: CPT

## 2019-10-08 PROCEDURE — 85025 COMPLETE CBC W/AUTO DIFF WBC: CPT | Performed by: EMERGENCY MEDICINE

## 2019-10-08 PROCEDURE — 96374 THER/PROPH/DIAG INJ IV PUSH: CPT

## 2019-10-08 PROCEDURE — 63710000001 INSULIN DETEMIR PER 5 UNITS: Performed by: INTERNAL MEDICINE

## 2019-10-08 PROCEDURE — 63710000001 INSULIN ASPART PER 5 UNITS: Performed by: INTERNAL MEDICINE

## 2019-10-08 PROCEDURE — 99220 PR INITIAL OBSERVATION CARE/DAY 70 MINUTES: CPT | Performed by: INTERNAL MEDICINE

## 2019-10-08 PROCEDURE — 25010000002 ONDANSETRON PER 1 MG: Performed by: EMERGENCY MEDICINE

## 2019-10-08 PROCEDURE — 84443 ASSAY THYROID STIM HORMONE: CPT | Performed by: INTERNAL MEDICINE

## 2019-10-08 PROCEDURE — 85025 COMPLETE CBC W/AUTO DIFF WBC: CPT | Performed by: INTERNAL MEDICINE

## 2019-10-08 PROCEDURE — 73552 X-RAY EXAM OF FEMUR 2/>: CPT

## 2019-10-08 RX ORDER — SODIUM CHLORIDE 0.9 % (FLUSH) 0.9 %
10 SYRINGE (ML) INJECTION AS NEEDED
Status: DISCONTINUED | OUTPATIENT
Start: 2019-10-08 | End: 2019-10-10 | Stop reason: HOSPADM

## 2019-10-08 RX ORDER — NICOTINE POLACRILEX 4 MG
15 LOZENGE BUCCAL
Status: DISCONTINUED | OUTPATIENT
Start: 2019-10-08 | End: 2019-10-10 | Stop reason: HOSPADM

## 2019-10-08 RX ORDER — CITALOPRAM 20 MG/1
40 TABLET ORAL DAILY
Status: DISCONTINUED | OUTPATIENT
Start: 2019-10-08 | End: 2019-10-10 | Stop reason: HOSPADM

## 2019-10-08 RX ORDER — HYDROMORPHONE HCL 110MG/55ML
0.5 PATIENT CONTROLLED ANALGESIA SYRINGE INTRAVENOUS
Status: DISCONTINUED | OUTPATIENT
Start: 2019-10-08 | End: 2019-10-10 | Stop reason: HOSPADM

## 2019-10-08 RX ORDER — SODIUM CHLORIDE 9 MG/ML
40 INJECTION, SOLUTION INTRAVENOUS AS NEEDED
Status: DISCONTINUED | OUTPATIENT
Start: 2019-10-08 | End: 2019-10-10 | Stop reason: HOSPADM

## 2019-10-08 RX ORDER — ONDANSETRON 4 MG/1
4 TABLET, FILM COATED ORAL EVERY 6 HOURS PRN
Status: DISCONTINUED | OUTPATIENT
Start: 2019-10-08 | End: 2019-10-10 | Stop reason: HOSPADM

## 2019-10-08 RX ORDER — HYDROCODONE BITARTRATE AND ACETAMINOPHEN 7.5; 325 MG/1; MG/1
1 TABLET ORAL EVERY 4 HOURS PRN
Status: DISCONTINUED | OUTPATIENT
Start: 2019-10-08 | End: 2019-10-10 | Stop reason: HOSPADM

## 2019-10-08 RX ORDER — HYDROMORPHONE HCL 110MG/55ML
0.5 PATIENT CONTROLLED ANALGESIA SYRINGE INTRAVENOUS ONCE
Status: COMPLETED | OUTPATIENT
Start: 2019-10-08 | End: 2019-10-08

## 2019-10-08 RX ORDER — DEXTROSE MONOHYDRATE 25 G/50ML
25 INJECTION, SOLUTION INTRAVENOUS
Status: DISCONTINUED | OUTPATIENT
Start: 2019-10-08 | End: 2019-10-10 | Stop reason: HOSPADM

## 2019-10-08 RX ORDER — ONDANSETRON 2 MG/ML
4 INJECTION INTRAMUSCULAR; INTRAVENOUS EVERY 6 HOURS PRN
Status: DISCONTINUED | OUTPATIENT
Start: 2019-10-08 | End: 2019-10-10 | Stop reason: HOSPADM

## 2019-10-08 RX ORDER — METHIMAZOLE 5 MG/1
5 TABLET ORAL DAILY
Status: DISCONTINUED | OUTPATIENT
Start: 2019-10-08 | End: 2019-10-10 | Stop reason: HOSPADM

## 2019-10-08 RX ORDER — PANTOPRAZOLE SODIUM 40 MG/1
40 TABLET, DELAYED RELEASE ORAL DAILY
Status: DISCONTINUED | OUTPATIENT
Start: 2019-10-08 | End: 2019-10-10 | Stop reason: HOSPADM

## 2019-10-08 RX ORDER — SENNA AND DOCUSATE SODIUM 50; 8.6 MG/1; MG/1
2 TABLET, FILM COATED ORAL NIGHTLY PRN
Status: DISCONTINUED | OUTPATIENT
Start: 2019-10-08 | End: 2019-10-10 | Stop reason: HOSPADM

## 2019-10-08 RX ORDER — METOPROLOL TARTRATE 50 MG/1
50 TABLET, FILM COATED ORAL EVERY 12 HOURS SCHEDULED
Status: DISCONTINUED | OUTPATIENT
Start: 2019-10-08 | End: 2019-10-10 | Stop reason: HOSPADM

## 2019-10-08 RX ORDER — CHOLECALCIFEROL (VITAMIN D3) 125 MCG
5 CAPSULE ORAL NIGHTLY PRN
Status: DISCONTINUED | OUTPATIENT
Start: 2019-10-08 | End: 2019-10-10 | Stop reason: HOSPADM

## 2019-10-08 RX ORDER — NALOXONE HCL 0.4 MG/ML
0.4 VIAL (ML) INJECTION
Status: DISCONTINUED | OUTPATIENT
Start: 2019-10-08 | End: 2019-10-10 | Stop reason: HOSPADM

## 2019-10-08 RX ORDER — ONDANSETRON 2 MG/ML
4 INJECTION INTRAMUSCULAR; INTRAVENOUS ONCE
Status: COMPLETED | OUTPATIENT
Start: 2019-10-08 | End: 2019-10-08

## 2019-10-08 RX ORDER — SODIUM CHLORIDE 0.9 % (FLUSH) 0.9 %
10 SYRINGE (ML) INJECTION EVERY 12 HOURS SCHEDULED
Status: DISCONTINUED | OUTPATIENT
Start: 2019-10-08 | End: 2019-10-10 | Stop reason: HOSPADM

## 2019-10-08 RX ADMIN — HYDROCODONE BITARTRATE AND ACETAMINOPHEN 1 TABLET: 7.5; 325 TABLET ORAL at 21:17

## 2019-10-08 RX ADMIN — INSULIN DETEMIR 30 UNITS: 100 INJECTION, SOLUTION SUBCUTANEOUS at 21:12

## 2019-10-08 RX ADMIN — PANTOPRAZOLE SODIUM 40 MG: 40 TABLET, DELAYED RELEASE ORAL at 09:47

## 2019-10-08 RX ADMIN — METOPROLOL TARTRATE 50 MG: 50 TABLET, FILM COATED ORAL at 21:17

## 2019-10-08 RX ADMIN — CITALOPRAM HYDROBROMIDE 40 MG: 20 TABLET ORAL at 09:47

## 2019-10-08 RX ADMIN — INSULIN ASPART 3 UNITS: 100 INJECTION, SOLUTION INTRAVENOUS; SUBCUTANEOUS at 17:07

## 2019-10-08 RX ADMIN — SODIUM CHLORIDE, PRESERVATIVE FREE 10 ML: 5 INJECTION INTRAVENOUS at 09:47

## 2019-10-08 RX ADMIN — METOPROLOL TARTRATE 50 MG: 50 TABLET, FILM COATED ORAL at 09:47

## 2019-10-08 RX ADMIN — HYDROCODONE BITARTRATE AND ACETAMINOPHEN 1 TABLET: 7.5; 325 TABLET ORAL at 17:10

## 2019-10-08 RX ADMIN — HYDROMORPHONE HYDROCHLORIDE 0.5 MG: 2 INJECTION, SOLUTION INTRAMUSCULAR; INTRAVENOUS; SUBCUTANEOUS at 00:12

## 2019-10-08 RX ADMIN — ONDANSETRON 4 MG: 2 INJECTION, SOLUTION INTRAMUSCULAR; INTRAVENOUS at 00:12

## 2019-10-08 RX ADMIN — HYDROCODONE BITARTRATE AND ACETAMINOPHEN 1 TABLET: 7.5; 325 TABLET ORAL at 09:10

## 2019-10-08 RX ADMIN — INSULIN ASPART 3 UNITS: 100 INJECTION, SOLUTION INTRAVENOUS; SUBCUTANEOUS at 08:09

## 2019-10-08 RX ADMIN — INSULIN DETEMIR 30 UNITS: 100 INJECTION, SOLUTION SUBCUTANEOUS at 09:46

## 2019-10-08 RX ADMIN — METHIMAZOLE 5 MG: 5 TABLET ORAL at 09:47

## 2019-10-08 RX ADMIN — HYDROMORPHONE HYDROCHLORIDE 0.5 MG: 2 INJECTION INTRAMUSCULAR; INTRAVENOUS; SUBCUTANEOUS at 05:02

## 2019-10-08 RX ADMIN — SODIUM CHLORIDE, PRESERVATIVE FREE 10 ML: 5 INJECTION INTRAVENOUS at 21:13

## 2019-10-08 RX ADMIN — INSULIN ASPART 5 UNITS: 100 INJECTION, SOLUTION INTRAVENOUS; SUBCUTANEOUS at 21:11

## 2019-10-08 RX ADMIN — MELATONIN TAB 5 MG 5 MG: 5 TAB at 21:11

## 2019-10-08 RX ADMIN — HYDROMORPHONE HYDROCHLORIDE 0.5 MG: 2 INJECTION INTRAMUSCULAR; INTRAVENOUS; SUBCUTANEOUS at 01:57

## 2019-10-08 NOTE — ED PROVIDER NOTES
Subjective   History of Present Illness  History of Present Illness    Chief complaint: Fall with right hip pain    Location: Home    Quality/Severity: Moderate, sharp    Timing/Onset/Duration: Acute onset just prior to arrival    Modifying Factors: Hurts to move, feels better to remain still    Associated Symptoms: No headache.  The patient has chronic neck pain is no worse than usual.  She has arthritis.  No chest pain or shortness of breath.  No abdominal pain.  No numbness, tingling, weakness, or change in bladder bowel function.    Narrative: This 69-year-old white female fell at home presents complaining of pain in the right posterior hip.  She is on Xarelto.  Patient has a history of atrial fibrillation.  The patient states that she tripped over her feet      Review of Systems   Constitutional: Negative for chills and fever.   HENT: Negative for ear pain and sore throat.    Eyes: Negative for discharge and redness.   Respiratory: Negative for cough, chest tightness and shortness of breath.    Cardiovascular: Negative for chest pain, palpitations and leg swelling.   Gastrointestinal: Negative for abdominal pain, diarrhea, nausea and vomiting.   Endocrine: Negative for polyuria.   Genitourinary: Negative for difficulty urinating, dysuria and flank pain.   Musculoskeletal: Negative for back pain and neck pain.        Right hip pain   Skin: Negative for rash.   Neurological: Negative for dizziness, speech difficulty, weakness, light-headedness, numbness and headaches.   Psychiatric/Behavioral: Negative.  Negative for confusion.        Medication List      ASK your doctor about these medications    citalopram 20 MG tablet  Commonly known as:  CeleXA     furosemide 40 MG tablet  Commonly known as:  LASIX     glyBURIDE-metFORMIN 5-500 MG per tablet  Commonly known as:  GLUCOVANCE     Insulin Glargine 100 UNIT/ML injection pen  Commonly known as:  LANTUS SOLOSTAR     methIMAzole 5 MG tablet  Commonly known as:   TAPAZOLE  TAKE ONE TABLET BY MOUTH DAILY     metoprolol tartrate 50 MG tablet  Commonly known as:  LOPRESSOR     naproxen 500 MG tablet  Commonly known as:  NAPROSYN     pantoprazole 40 MG EC tablet  Commonly known as:  PROTONIX     rivaroxaban 20 MG tablet  Commonly known as:  XARELTO     traMADol 50 MG tablet  Commonly known as:  ULTRAM  Take 1 tablet by mouth Every 6 (Six) Hours As Needed for Moderate Pain .          Past Medical History:   Diagnosis Date   • Arthritis    • Depression    • GERD (gastroesophageal reflux disease)    • Headache    • Sleep apnea    • Type 2 diabetes mellitus (CMS/HCC)        Allergies   Allergen Reactions   • Other        Past Surgical History:   Procedure Laterality Date   • CHOLECYSTECTOMY     • HYSTERECTOMY     • ROTATOR CUFF REPAIR      ACUTE   • SHOULDER SURGERY Bilateral     hAS HAD ROTATOR CUFF SURGERY ON BOTH SHOULDERS       Family History   Problem Relation Age of Onset   • Cancer Other    • Diabetes Other    • Glaucoma Other    • Rheum arthritis Other    • Kidney disease Other        Social History     Socioeconomic History   • Marital status:      Spouse name: Not on file   • Number of children: Not on file   • Years of education: Not on file   • Highest education level: Not on file   Tobacco Use   • Smoking status: Never Smoker   Substance and Sexual Activity   • Alcohol use: No           Objective   Physical Exam   Constitutional: She is oriented to person, place, and time. She appears well-developed and well-nourished. No distress.   ED Triage Vitals (10/07/19 2317)  Temp: 98.4 °F (36.9 °C)  Heart Rate: 87  Resp: 16  BP: 166/77  SpO2: 97 %  Temp src: n/a  Heart Rate Source: n/a  Patient Position: n/a  BP Location: n/a  FiO2 (%): n/a    The patient's vitals were reviewed by me.  Unless otherwise noted they are within normal limits.     HENT:   Head: Normocephalic and atraumatic.   Right Ear: External ear normal.   Left Ear: External ear normal.   Nose: Nose  normal.   Mouth/Throat: Oropharynx is clear and moist. No oropharyngeal exudate.   Eyes: Conjunctivae and EOM are normal. Pupils are equal, round, and reactive to light. Right eye exhibits no discharge. Left eye exhibits no discharge. No scleral icterus.   Neck: Normal range of motion. Neck supple. No JVD present. No tracheal deviation present. No thyromegaly present.   Mild neck tenderness, chronic, no worse than usual   Cardiovascular: Normal rate, regular rhythm, normal heart sounds and intact distal pulses. Exam reveals no gallop and no friction rub.   No murmur heard.  Pulmonary/Chest: Effort normal and breath sounds normal. No stridor. No respiratory distress. She has no wheezes. She has no rales. She exhibits no tenderness.   Abdominal: Soft. Bowel sounds are normal. She exhibits no distension and no mass. There is no tenderness. There is no rebound and no guarding. No hernia.   Musculoskeletal: Normal range of motion. She exhibits tenderness. She exhibits no edema or deformity.   The patient has tenderness upon palpation of the right posterior hip.  The capillary refill is less than 2 seconds.  The sensation is intact.  There is a 2+ dorsalis pedis pulse and posterior tibial pulse.  The extremities are otherwise without tenderness or deformity and neurovascularly intact.   Lymphadenopathy:     She has no cervical adenopathy.   Neurological: She is alert and oriented to person, place, and time. No cranial nerve deficit or sensory deficit. She exhibits normal muscle tone.   Skin: Skin is warm and dry. No rash noted. She is not diaphoretic. No erythema. No pallor.   Psychiatric: Her behavior is normal.   Nursing note and vitals reviewed.      Procedures           ED Course  ED Course as of Oct 08 0052   Tue Oct 08, 2019   0048 The laboratory values were reviewed by me.  The serum glucose is 170.  The BUN is 25.  The creatinine is 1.04.  The GFR is 53.  The hemoglobin is 10.4.  The hematocrit is 31.  The  laboratory values are otherwise unremarkable.  [RC]      ED Course User Index  [RC] Chip Gomez MD      12:52 AM, 10/08/19:  The patient was reassessed.  She is complaining of pain lower down the back of the right femur.  We will obtain a right femur x-ray.  She rates her pain a 5/10.    1:44 AM, 10/08/19:  The patient was reassessed.  Her vital signs were reviewed and are stable.  She rates her pain as 5/10 her right lower extremity is neurovascularly intact.  She cannot safely ambulate even with a walker.    1:47 AM, 10/08/19:  I spoke with Dr. Kent, on-call for the hospitalist, she will bring the patient in for observation with orthopedic consultation.            [facility]    Final diagnoses:   None              Chip Gomez MD  10/08/19 0153

## 2019-10-08 NOTE — ED NOTES
Attempted to ambulate patient. With much assistance she took about 5 steps. Was given a walker. Patient still doesn't want to put weight on right leg. Patient assisted back to bed.     Cira Teresa, RN  10/08/19 0219

## 2019-10-08 NOTE — NURSING NOTE
Discharge Planning Assessment  [address]     Patient Name: Lisa Gavin  MRN: [id number]  Today's Date: 10/8/2019    Admit Date: 10/7/2019    Discharge Needs Assessment     Row Name 10/08/19 1042       Living Environment    Lives With  spouse;friend(s)    Name(s) of Who Lives With Patient  Lives with  Jack Gavin and a friend lives with them.      Current Living Arrangements  home/apartment/condo    Primary Care Provided by  self    Provides Primary Care For  no one    Family Caregiver if Needed  spouse    Family Caregiver Names  Jack     Quality of Family Relationships  supportive    Able to Return to Prior Arrangements  yes       Resource/Environmental Concerns    Resource/Environmental Concerns  none    Transportation Concerns  car, none       Transition Planning    Patient/Family Anticipates Transition to  home    Patient/Family Anticipated Services at Transition  none    Transportation Anticipated  family or friend will provide       Discharge Needs Assessment    Readmission Within the Last 30 Days  no previous admission in last 30 days    Equipment Currently Used at Home  bipap/cpap;glucometer    Anticipated Changes Related to Illness  none    Equipment Needed After Discharge  none        Discharge Plan     Row Name 10/08/19 1044       Plan    Plan  d/c home     Patient/Family in Agreement with Plan  yes    Plan Comments  Met with pt and her  in room.  Permission to speak in front of  obtained.  Facesheet verified.  Pt lives in home with her  and a friend.  Pt states her bedroom is upstairs but there are bedrooms downstairs as well.  Pt is typically independent with ADL;s and driving.  Denies any problems obtaining medications or paying for them.  Pt uses a cpap and a glucometer at home.  Both are in good working order, per pt report.  No other DME required and pt has never required HH/rehab.  Pt does not have advanced directives and declines the offer of information to  create advanced directives.  Pt denies any concerns r/t d/c.   CM will continue to follow.  Name and number placed on white board.            Demographic Summary     Row Name 10/08/19 1039       General Information    Admission Type  observation    Arrived From  home    Referral Source  admission list    Reason for Consult  discharge planning    Preferred Language  English     Used During This Interaction  liang Murphy RN

## 2019-10-08 NOTE — PROGRESS NOTES
MsNicola Gavin was seen and examined on rounds today.  Discussed case w/ Dr. Reid.  Will resume diet.

## 2019-10-08 NOTE — H&P
[facility] HOSPITALIST     Edith Chávez PA    CHIEF COMPLAINT: Inability to wt bear on rt leg after mechanical fall    HISTORY OF PRESENT ILLNESS:    68 yo WF w/ PMH of DM 2 (On chronic insulin), KRISTIN, PAF (on NOAC), Hyperthyroidism (On methimazole), Left knee osteoarthritis, and obesity. Pt is in her usual good health. Was getting ready for bed, and while walking, doesn't fully remember if her left knee gave way, or if she tripped over her feet, but she fell on her rt hip. No LOC.   Now have constant mild posterior pain in her rt hip/upper thigh. Worse with rotation, flexion of the hip, and wt baring. Not associated with numbness and tingling.    Denies soa, cp, palpitations, vertigo, pre-syncope, tongue biting, or bowel/bladder incontinence right before her fall.    States pets were not near her, and she didn't think any thing was on the floor for her to trip. Had immediate pain, and inability to wt bear. Brought in by EMS.     Xrays negative in ER, but pt was not able to put weight on leg inorder to get home.    Is able to climb stairs and perform 4 METS without difficulties. All health conditions under good control. Recently semi-retired, and had some interruptions to medications, so last a1c was slightly high. It's 7.1 today.     ROS negative for fever, chills, cough, sputum.      Past Medical History:   Diagnosis Date   • Arthritis    • Depression    • GERD (gastroesophageal reflux disease)    • Headache    • Sleep apnea    • Type 2 diabetes mellitus (CMS/HCC)      Past Surgical History:   Procedure Laterality Date   • CHOLECYSTECTOMY     • HYSTERECTOMY     • ROTATOR CUFF REPAIR      ACUTE   • SHOULDER SURGERY Bilateral     hAS HAD ROTATOR CUFF SURGERY ON BOTH SHOULDERS     Family History   Problem Relation Age of Onset   • Cancer Other    • Diabetes Other    • Glaucoma Other    • Rheum arthritis Other    • Kidney disease Other      Social History     Tobacco Use   • Smoking status:  "Never Smoker   • Smokeless tobacco: Never Used   Substance Use Topics   • Alcohol use: No   • Drug use: No     Medications Prior to Admission   Medication Sig Dispense Refill Last Dose   • citalopram (CeleXA) 20 MG tablet Take 2 tablets by mouth 2 (two) times a day.   10/7/2019 at 0900   • furosemide (LASIX) 40 MG tablet Take 1 tablet by mouth Daily. PATIENT STATES SHE TAKES IT EVERY OTHER DAY   10/7/2019 at 1200   • glyBURIDE-metFORMIN (GLUCOVANCE) 5-500 MG per tablet Take 2 tablets by mouth 2 (two) times a day.   10/7/2019 at 0900   • Insulin Glargine 100 UNIT/ML solution pen-injector Inject 58 Units under the skin into the appropriate area as directed 2 (Two) Times a Day.   10/7/2019 at 0600   • methIMAzole (TAPAZOLE) 5 MG tablet TAKE ONE TABLET BY MOUTH DAILY 30 tablet 0 10/7/2019 at 0900   • metoprolol tartrate (LOPRESSOR) 50 MG tablet    10/7/2019 at 0900   • naproxen (NAPROSYN) 500 MG tablet Take 1 tablet by mouth 2 (two) times a day.   10/7/2019 at 0900   • pantoprazole (PROTONIX) 40 MG EC tablet Take 1 tablet by mouth daily.   10/7/2019 at 0900   • rivaroxaban (XARELTO) tablet Take 1 tablet by mouth daily.   10/6/2019 at 2000   • traMADol (ULTRAM) 50 MG tablet Take 1 tablet by mouth Every 6 (Six) Hours As Needed for Moderate Pain . 30 tablet 0      Allergies:  Other  Debilities: As per HPI and Physical Exam.     REVIEW OF SYSTEMS:  Please see the above history of present illness for pertinent positives and negatives.  The remainder of the patient's systems have been reviewed and are negative.     Vital Signs  Temp:  [98.4 °F (36.9 °C)] 98.4 °F (36.9 °C)  Heart Rate:  [73-87] 73  Resp:  [16] 16  BP: (152-166)/(77) 152/77    Flowsheet Rows      First Filed Value   Admission Height  162.6 cm (64\") Documented at 10/07/2019 2317   Admission Weight  102 kg (224 lb 9.6 oz) Documented at 10/07/2019 2317           Physical Exam:  Physical Exam   Constitutional: She is oriented to person, place, and time. She appears " well-developed and well-nourished. No distress.   HENT:   Head: Normocephalic and atraumatic.   Right Ear: External ear normal.   Left Ear: External ear normal.   Nose: Nose normal.   Mouth/Throat: Oropharynx is clear and moist. No oropharyngeal exudate.   Eyes: Conjunctivae and EOM are normal. Pupils are equal, round, and reactive to light. No scleral icterus.   Neck: No JVD present. No tracheal deviation present.   Cardiovascular: Normal rate, regular rhythm and normal heart sounds. Exam reveals no friction rub.   No murmur heard.  Pulmonary/Chest: Effort normal and breath sounds normal. No stridor. No respiratory distress. She has no wheezes. She has no rales.   Abdominal: Soft. Bowel sounds are normal. She exhibits no distension. There is no tenderness. There is no guarding.   Musculoskeletal: She exhibits tenderness. She exhibits no edema or deformity.   Rt leg: Pt prefers to leave leg externally rotated  Roll test positive, w/ reduced ROM   Neurological: She is alert and oriented to person, place, and time. She displays normal reflexes. No cranial nerve deficit. She exhibits normal muscle tone. Coordination normal.   Skin: Skin is warm and dry. She is not diaphoretic. No erythema.   Psychiatric: She has a normal mood and affect. Her behavior is normal.   Nursing note and vitals reviewed.       Results Review:    I reviewed the patient's new clinical results.  Lab Results (most recent)     Procedure Component Value Units Date/Time    POC Glucose Once [[id number]]  (Abnormal) Collected:  10/08/19 0233    Specimen:  Blood Updated:  10/08/19 0239     Glucose 212 mg/dL     Comprehensive Metabolic Panel [[id number]]  (Abnormal) Collected:  10/08/19 0002    Specimen:  Blood Updated:  10/08/19 0024     Glucose 170 mg/dL      BUN 25 mg/dL      Creatinine 1.04 mg/dL      Sodium 138 mmol/L      Potassium 4.5 mmol/L      Chloride 102 mmol/L      CO2 24.7 mmol/L      Calcium 9.7 mg/dL      Total Protein 6.9 g/dL      Albumin  3.70 g/dL      ALT (SGPT) 23 U/L      AST (SGOT) 24 U/L      Alkaline Phosphatase 83 U/L      Total Bilirubin 0.2 mg/dL      eGFR Non African Amer 53 mL/min/1.73      Globulin 3.2 gm/dL      A/G Ratio 1.2 g/dL      BUN/Creatinine Ratio 24.0     Anion Gap 11.3 mmol/L     Narrative:       GFR Normal >60  Chronic Kidney Disease <60  Kidney Failure <15    CBC & Differential [[id number]] Collected:  10/08/19 0002    Specimen:  Blood Updated:  10/08/19 0007    Narrative:       The following orders were created for panel order CBC & Differential.  Procedure                               Abnormality         Status                     ---------                               -----------         ------                     CBC Auto Differential[[id number]]        Abnormal            Final result                 Please view results for these tests on the individual orders.    CBC Auto Differential [[id number]]  (Abnormal) Collected:  10/08/19 0002    Specimen:  Blood Updated:  10/08/19 0007     WBC 7.24 10*3/mm3      RBC 3.23 10*6/mm3      Hemoglobin 10.4 g/dL      Hematocrit 31.1 %      MCV 96.3 fL      MCH 32.2 pg      MCHC 33.4 g/dL      RDW 13.1 %      RDW-SD 46.4 fl      MPV 11.3 fL      Platelets 217 10*3/mm3      Neutrophil % 62.1 %      Lymphocyte % 26.1 %      Monocyte % 6.9 %      Eosinophil % 3.9 %      Basophil % 0.4 %      Immature Grans % 0.6 %      Neutrophils, Absolute 4.50 10*3/mm3      Lymphocytes, Absolute 1.89 10*3/mm3      Monocytes, Absolute 0.50 10*3/mm3      Eosinophils, Absolute 0.28 10*3/mm3      Basophils, Absolute 0.03 10*3/mm3      Immature Grans, Absolute 0.04 10*3/mm3      nRBC 0.0 /100 WBC           Imaging Results (most recent)     Procedure Component Value Units Date/Time    XR Femur 2 View Right [[id number]] Collected:  10/08/19 0109     Updated:  10/08/19 0129    Narrative:       CR Femur 2 Vws RT    INDICATION:   69-year-old female who fell tonight. Pain from the right hip through the mid  femur.    COMPARISON:   None available.    FINDINGS:   2 views of the right femur.  No fracture or dislocation.  No bone erosion or destruction. Articulation at the hip and knee is anatomic.  No foreign body.      Impression:         1. Degenerative change of the right hip and right knee, otherwise negative.    Signer Name: Marco A Sánchez MD   Signed: 10/8/2019 1:09 AM   Workstation Name: [facility]    [facility]    XR Hip With or Without Pelvis 2 - 3 View Right [[id number]] Collected:  10/08/19 0008     Updated:  10/08/19 0049    Narrative:       CR Hip Uni Comp Min 2 Vws RT    INDICATION:   Fell just before arrival in the ER tonight. Pain in the right hip to the mid right femur.    COMPARISON:   None.    FINDINGS:  AP and frog-leg lateral view(s) of the right hip.  No fracture or dislocation. There are degenerative changes of the right hip. No bone erosion or destruction.        Impression:       Degenerative changes of the right hip. No fracture.    Signer Name: Marco A Sánchez MD   Signed: 10/8/2019 12:08 AM   Workstation Name: [facility]    [facility]    CT Head Without Contrast [[id number]] Collected:  10/08/19 0012     Updated:  10/08/19 0049    Narrative:       CT Head WO    HISTORY:   69-year-old female who tripped and fell tonight. Does not believe she had her head but is on blood thinners.    TECHNIQUE:   Axial unenhanced head CT. Radiation dose reduction techniques included automated exposure control or exposure modulation based on body size. Count of known CT and cardiac nuc med studies performed in previous 12 months: 0.     Time of scan: 2359 hours    COMPARISON:   None.    FINDINGS:   No intracranial hemorrhage, mass, or infarct. No hydrocephalus or extra-axial fluid collection. There are senescent changes, including volume loss and nonspecific white matter change, but no acute abnormality is seen. The skull base, calvarium, and  extracranial soft  tissues are normal. Incidental chronic-appearing right maxillary sinus disease.      Impression:       Normal, negative unenhanced head CT.          Signer Name: Marco A Sánchez MD   Signed: 10/8/2019 12:12 AM   Workstation Name: [email or web address]    [facility] [place]        reviewed    ECG/EMG Results (most recent)     None        reviewed    Assessment/Plan     Rt hip pain concerning for occult hip/pelvic frx  - Xray negative  - Ortho consult  - Started IV for severe, and PO pain control.   - Holding rivaroxaban  - NPO     DM  - On both metformin and lantus  - Did not take DM meds last night  - 60 units lantus bid usual dose. Is 170's right now, will give 30 units in AM, and cover with ssi  - a1c 7.1    Hyperthyroidism  - Ordered home methiazole  - TSH pending    Left Knee pain  - ?Menicus/Cruciate tear contributing to fall?    PAF  - Holding rivaroxaban  - on home metop    I discussed the patients findings and my recommendations with patient.     Eugenia Kent MD  10/08/19  3:28 AM

## 2019-10-08 NOTE — PLAN OF CARE
Problem: Patient Care Overview  Goal: Discharge Needs Assessment  Outcome: Ongoing (interventions implemented as appropriate)   10/08/19 1042   Discharge Needs Assessment   Readmission Within the Last 30 Days no previous admission in last 30 days   Patient/Family Anticipates Transition to home   Patient/Family Anticipated Services at Transition none   Transportation Concerns car, none   Transportation Anticipated family or friend will provide   Anticipated Changes Related to Illness none   Equipment Needed After Discharge none   Disability   Equipment Currently Used at Home bipap/cpap;glucometer

## 2019-10-08 NOTE — PLAN OF CARE
Problem: Patient Care Overview  Goal: Plan of Care Review  Outcome: Ongoing (interventions implemented as appropriate)   10/08/19 1647   Coping/Psychosocial   Plan of Care Reviewed With patient   Plan of Care Review   Progress no change   OTHER   Outcome Summary pt complains of right lower extremity pain, refuses pain medication. ortho saw today. continuing to monitor condition. pt is unable to bear weight or move the extremity without pain.      Goal: Discharge Needs Assessment  Outcome: Ongoing (interventions implemented as appropriate)   10/08/19 1042 10/08/19 1647   Discharge Needs Assessment   Readmission Within the Last 30 Days no previous admission in last 30 days --    Concerns to be Addressed --  denies needs/concerns at this time   Patient/Family Anticipates Transition to home --    Patient/Family Anticipated Services at Transition none --    Transportation Concerns car, none --    Transportation Anticipated family or friend will provide --    Anticipated Changes Related to Illness none --    Equipment Needed After Discharge none --    Disability   Equipment Currently Used at Home bipap/cpap;glucometer --        Problem: Fall Risk (Adult)  Goal: Identify Related Risk Factors and Signs and Symptoms  Outcome: Ongoing (interventions implemented as appropriate)   10/08/19 1647   Fall Risk (Adult)   Related Risk Factors (Fall Risk) history of falls;gait/mobility problems;environment unfamiliar     Goal: Absence of Fall  Outcome: Ongoing (interventions implemented as appropriate)   10/08/19 1647   Fall Risk (Adult)   Absence of Fall making progress toward outcome       Problem: Skin Injury Risk (Adult)  Goal: Identify Related Risk Factors and Signs and Symptoms  Outcome: Ongoing (interventions implemented as appropriate)   10/08/19 1647   Skin Injury Risk (Adult)   Related Risk Factors (Skin Injury Risk) body weight extremes;mobility impaired     Goal: Skin Health and Integrity  Outcome: Ongoing (interventions  implemented as appropriate)   10/08/19 2293   Skin Injury Risk (Adult)   Skin Health and Integrity making progress toward outcome

## 2019-10-09 LAB
ANION GAP SERPL CALCULATED.3IONS-SCNC: 13.3 MMOL/L (ref 5–15)
BUN BLD-MCNC: 21 MG/DL (ref 8–23)
BUN/CREAT SERPL: 23.3 (ref 7–25)
CALCIUM SPEC-SCNC: 9 MG/DL (ref 8.6–10.5)
CHLORIDE SERPL-SCNC: 105 MMOL/L (ref 98–107)
CO2 SERPL-SCNC: 22.7 MMOL/L (ref 22–29)
CREAT BLD-MCNC: 0.9 MG/DL (ref 0.57–1)
GFR SERPL CREATININE-BSD FRML MDRD: 62 ML/MIN/1.73
GLUCOSE BLD-MCNC: 86 MG/DL (ref 65–99)
GLUCOSE BLDC GLUCOMTR-MCNC: 163 MG/DL (ref 70–130)
GLUCOSE BLDC GLUCOMTR-MCNC: 248 MG/DL (ref 70–130)
GLUCOSE BLDC GLUCOMTR-MCNC: 288 MG/DL (ref 70–130)
GLUCOSE BLDC GLUCOMTR-MCNC: 83 MG/DL (ref 70–130)
POTASSIUM BLD-SCNC: 4.6 MMOL/L (ref 3.5–5.2)
SODIUM BLD-SCNC: 141 MMOL/L (ref 136–145)

## 2019-10-09 PROCEDURE — G0378 HOSPITAL OBSERVATION PER HR: HCPCS

## 2019-10-09 PROCEDURE — 25010000002 ENOXAPARIN PER 10 MG: Performed by: HOSPITALIST

## 2019-10-09 PROCEDURE — 63710000001 METHYLPREDNISOLONE 4 MG TABLET THERAPY PACK 21 EACH DISP PACK: Performed by: ORTHOPAEDIC SURGERY

## 2019-10-09 PROCEDURE — 97161 PT EVAL LOW COMPLEX 20 MIN: CPT

## 2019-10-09 PROCEDURE — 82962 GLUCOSE BLOOD TEST: CPT

## 2019-10-09 PROCEDURE — 99225 PR SBSQ OBSERVATION CARE/DAY 25 MINUTES: CPT | Performed by: HOSPITALIST

## 2019-10-09 PROCEDURE — 94660 CPAP INITIATION&MGMT: CPT

## 2019-10-09 PROCEDURE — 63710000001 INSULIN ASPART PER 5 UNITS: Performed by: INTERNAL MEDICINE

## 2019-10-09 PROCEDURE — 96376 TX/PRO/DX INJ SAME DRUG ADON: CPT

## 2019-10-09 PROCEDURE — 25010000002 HYDROMORPHONE PER 4 MG: Performed by: INTERNAL MEDICINE

## 2019-10-09 PROCEDURE — 94799 UNLISTED PULMONARY SVC/PX: CPT

## 2019-10-09 PROCEDURE — 80048 BASIC METABOLIC PNL TOTAL CA: CPT | Performed by: INTERNAL MEDICINE

## 2019-10-09 PROCEDURE — 96372 THER/PROPH/DIAG INJ SC/IM: CPT

## 2019-10-09 PROCEDURE — 63710000001 INSULIN DETEMIR PER 5 UNITS: Performed by: INTERNAL MEDICINE

## 2019-10-09 PROCEDURE — 99203 OFFICE O/P NEW LOW 30 MIN: CPT | Performed by: ORTHOPAEDIC SURGERY

## 2019-10-09 PROCEDURE — 63710000001 INSULIN DETEMIR PER 5 UNITS: Performed by: HOSPITALIST

## 2019-10-09 RX ORDER — METHYLPREDNISOLONE 4 MG/1
4 TABLET ORAL
Status: COMPLETED | OUTPATIENT
Start: 2019-10-09 | End: 2019-10-09

## 2019-10-09 RX ORDER — METHYLPREDNISOLONE 4 MG/1
4 TABLET ORAL
Status: DISCONTINUED | OUTPATIENT
Start: 2019-10-12 | End: 2019-10-10 | Stop reason: HOSPADM

## 2019-10-09 RX ORDER — METHYLPREDNISOLONE 4 MG/1
4 TABLET ORAL
Status: DISCONTINUED | OUTPATIENT
Start: 2019-10-10 | End: 2019-10-10 | Stop reason: HOSPADM

## 2019-10-09 RX ORDER — METHYLPREDNISOLONE 4 MG/1
4 TABLET ORAL
Status: DISCONTINUED | OUTPATIENT
Start: 2019-10-11 | End: 2019-10-10 | Stop reason: HOSPADM

## 2019-10-09 RX ORDER — METHYLPREDNISOLONE 4 MG/1
4 TABLET ORAL
Status: DISCONTINUED | OUTPATIENT
Start: 2019-10-14 | End: 2019-10-10 | Stop reason: HOSPADM

## 2019-10-09 RX ORDER — METHYLPREDNISOLONE 4 MG/1
8 TABLET ORAL
Status: COMPLETED | OUTPATIENT
Start: 2019-10-09 | End: 2019-10-09

## 2019-10-09 RX ORDER — METHYLPREDNISOLONE 4 MG/1
4 TABLET ORAL
Status: DISCONTINUED | OUTPATIENT
Start: 2019-10-13 | End: 2019-10-10 | Stop reason: HOSPADM

## 2019-10-09 RX ORDER — METHYLPREDNISOLONE 4 MG/1
8 TABLET ORAL
Status: DISCONTINUED | OUTPATIENT
Start: 2019-10-10 | End: 2019-10-10 | Stop reason: HOSPADM

## 2019-10-09 RX ADMIN — HYDROCODONE BITARTRATE AND ACETAMINOPHEN 1 TABLET: 7.5; 325 TABLET ORAL at 08:03

## 2019-10-09 RX ADMIN — HYDROCODONE BITARTRATE AND ACETAMINOPHEN 1 TABLET: 7.5; 325 TABLET ORAL at 17:45

## 2019-10-09 RX ADMIN — METHYLPREDNISOLONE 8 MG: 4 TABLET ORAL at 21:48

## 2019-10-09 RX ADMIN — ENOXAPARIN SODIUM 40 MG: 40 INJECTION SUBCUTANEOUS at 13:09

## 2019-10-09 RX ADMIN — HYDROMORPHONE HYDROCHLORIDE 0.5 MG: 2 INJECTION INTRAMUSCULAR; INTRAVENOUS; SUBCUTANEOUS at 11:32

## 2019-10-09 RX ADMIN — METOPROLOL TARTRATE 50 MG: 50 TABLET, FILM COATED ORAL at 22:11

## 2019-10-09 RX ADMIN — METHYLPREDNISOLONE 8 MG: 4 TABLET ORAL at 09:51

## 2019-10-09 RX ADMIN — CITALOPRAM HYDROBROMIDE 40 MG: 20 TABLET ORAL at 08:05

## 2019-10-09 RX ADMIN — METHIMAZOLE 5 MG: 5 TABLET ORAL at 08:08

## 2019-10-09 RX ADMIN — METOPROLOL TARTRATE 50 MG: 50 TABLET, FILM COATED ORAL at 08:08

## 2019-10-09 RX ADMIN — INSULIN DETEMIR 40 UNITS: 100 INJECTION, SOLUTION SUBCUTANEOUS at 22:13

## 2019-10-09 RX ADMIN — INSULIN DETEMIR 30 UNITS: 100 INJECTION, SOLUTION SUBCUTANEOUS at 09:52

## 2019-10-09 RX ADMIN — SODIUM CHLORIDE, PRESERVATIVE FREE 10 ML: 5 INJECTION INTRAVENOUS at 21:49

## 2019-10-09 RX ADMIN — METHYLPREDNISOLONE 4 MG: 4 TABLET ORAL at 13:09

## 2019-10-09 RX ADMIN — PANTOPRAZOLE SODIUM 40 MG: 40 TABLET, DELAYED RELEASE ORAL at 08:08

## 2019-10-09 RX ADMIN — INSULIN ASPART 5 UNITS: 100 INJECTION, SOLUTION INTRAVENOUS; SUBCUTANEOUS at 22:12

## 2019-10-09 RX ADMIN — INSULIN ASPART 8 UNITS: 100 INJECTION, SOLUTION INTRAVENOUS; SUBCUTANEOUS at 17:41

## 2019-10-09 RX ADMIN — METHYLPREDNISOLONE 4 MG: 4 TABLET ORAL at 18:19

## 2019-10-09 RX ADMIN — INSULIN ASPART 3 UNITS: 100 INJECTION, SOLUTION INTRAVENOUS; SUBCUTANEOUS at 12:37

## 2019-10-09 RX ADMIN — SODIUM CHLORIDE, PRESERVATIVE FREE 10 ML: 5 INJECTION INTRAVENOUS at 08:09

## 2019-10-09 NOTE — PLAN OF CARE
Problem: Patient Care Overview  Goal: Plan of Care Review   10/09/19 1549   Coping/Psychosocial   Plan of Care Reviewed With patient   Plan of Care Review   Progress no change   OTHER   Outcome Summary patient continues to complain of RLE pain , but accepts minimal pain medication. was seen by PT today and ambulated minimally in the room. started on steroid pack. recieving lovenox for VTE. vitals stable. will continue to monitor functionality and provide pain management     Goal: Individualization and Mutuality  Outcome: Ongoing (interventions implemented as appropriate)   10/09/19 1549   Individualization   Patient Specific Preferences likes the door closed to minimize noise     Goal: Discharge Needs Assessment  Outcome: Ongoing (interventions implemented as appropriate)   10/08/19 1042 10/08/19 1647   Discharge Needs Assessment   Readmission Within the Last 30 Days no previous admission in last 30 days --    Concerns to be Addressed --  denies needs/concerns at this time   Patient/Family Anticipates Transition to home --    Patient/Family Anticipated Services at Transition none --    Transportation Concerns car, none --    Transportation Anticipated family or friend will provide --    Anticipated Changes Related to Illness none --    Equipment Needed After Discharge none --    Disability   Equipment Currently Used at Home bipap/cpap;glucometer --        Problem: Fall Risk (Adult)  Goal: Identify Related Risk Factors and Signs and Symptoms  Outcome: Ongoing (interventions implemented as appropriate)   10/08/19 1647   Fall Risk (Adult)   Related Risk Factors (Fall Risk) history of falls;gait/mobility problems;environment unfamiliar     Goal: Absence of Fall  Outcome: Ongoing (interventions implemented as appropriate)   10/09/19 1549   Fall Risk (Adult)   Absence of Fall making progress toward outcome       Problem: Skin Injury Risk (Adult)  Goal: Identify Related Risk Factors and Signs and Symptoms  Outcome: Ongoing  (interventions implemented as appropriate)   10/08/19 1647   Skin Injury Risk (Adult)   Related Risk Factors (Skin Injury Risk) body weight extremes;mobility impaired     Goal: Skin Health and Integrity  Outcome: Ongoing (interventions implemented as appropriate)   10/09/19 1549   Skin Injury Risk (Adult)   Skin Health and Integrity making progress toward outcome

## 2019-10-09 NOTE — THERAPY EVALUATION
Acute Care - Physical Therapy Initial Evaluation   Altagrac[facility] Uribe     Patient Name: Lisa Gavin  : 1950  MRN: [id number]  Today's Date: 10/9/2019   Onset of Illness/Injury or Date of Surgery: 10/07/19  Date of Referral to PT: 10/09/19  Referring Physician: Dr. Reid      Admit Date: 10/7/2019    Visit Dx:     ICD-10-CM ICD-9-CM   1. Fall, initial encounter W19.XXXA E888.9   2. Right hamstring strain, initial encounter S76.311A 843.8     Patient Active Problem List   Diagnosis   • Hyperthyroidism   • Thyroid nodule   • Fatigue   • Abnormal weight gain   • Uncontrolled type 2 diabetes mellitus (CMS/HCC)   • Inadequate pain control   • Acid reflux   • Paroxysmal atrial fibrillation (CMS/HCC)   • Fall at home   • Posterior pain of right hip     Past Medical History:   Diagnosis Date   • Arthritis    • Depression    • GERD (gastroesophageal reflux disease)    • Headache    • Sleep apnea    • Type 2 diabetes mellitus (CMS/HCC)      Past Surgical History:   Procedure Laterality Date   • CHOLECYSTECTOMY     • HYSTERECTOMY     • ROTATOR CUFF REPAIR      ACUTE   • SHOULDER SURGERY Bilateral     hAS HAD ROTATOR CUFF SURGERY ON BOTH SHOULDERS        PT ASSESSMENT (last 12 hours)      Physical Therapy Evaluation     Row Name 10/09/19 1100          PT Evaluation Time/Intention    Subjective Information  complains of;pain  -BP     Document Type  evaluation  -BP     Mode of Treatment  physical therapy  -BP     Patient Effort  good  -BP     Symptoms Noted During/After Treatment  increased pain  -BP     Row Name 10/09/19 1100          General Information    Patient Profile Reviewed?  yes  -BP     Onset of Illness/Injury or Date of Surgery  10/07/19  -BP     Referring Physician  Dr. Reid  -BP     Patient Observations  alert;cooperative;agree to therapy  -BP     Patient/Family Observations  Patient supine in bed with HOB elevated. Agreeable to PT evaluation. RN presents with knee immobilizer   -BP     Prior Level of  Function  independent:;gait;all household mobility;community mobility;transfer;bed mobility;ADL's;driving;home management  -BP     Equipment Currently Used at Home  -- owns: FWW, BSC, shower chair, BSC, Long handled reacher  -BP     Pertinent History of Current Functional Problem  Patient presents s/p fall at home. Patient diagnosed with R hamstring strain. Per MD patient to wear knee immobilizer with ambulation.   -BP     Existing Precautions/Restrictions  fall;brace worn when out of bed  -BP     Risks Reviewed  patient:;LOB;increased discomfort  -BP     Benefits Reviewed  patient:;improve function;increase independence;increase strength  -BP     Barriers to Rehab  none identified  -BP     Row Name 10/09/19 1100          Relationship/Environment    Lives With  spouse  -BP     Row Name 10/09/19 1100          Resource/Environmental Concerns    Current Living Arrangements  home/apartment/condo  -BP     Row Name 10/09/19 1100          Living Environment    Home Accessibility  stairs to enter home;stairs within home  -BP     Row Name 10/09/19 1100          Home Main Entrance    Number of Stairs, Main Entrance  two  -BP     Stair Railings, Main Entrance  none  -BP     Stairs Comment, Main Entrance  Patient lives in two story home however able to stay on the first floor.   -BP     Row Name 10/09/19 1100          Cognitive Assessment/Interventions    Additional Documentation  Cognitive Assessment/Intervention (Group)  -BP     Row Name 10/09/19 1100          Cognitive Assessment/Intervention- PT/OT    Orientation Status (Cognition)  oriented x 4  -BP     Follows Commands (Cognition)  WFL  -BP     Personal Safety Interventions  gait belt;nonskid shoes/slippers when out of bed  -BP     Row Name 10/09/19 1100          Safety Issues, Functional Mobility    Safety Issues Affecting Function (Mobility)  positioning of assistive device;problem solving  -BP     Comment, Safety Issues/Impairments (Mobility)  requires cues for  improved positioning of AD.  -BP     Row Name 10/09/19 1100          Mobility Assessment/Treatment    Extremity Weight-bearing Status  right lower extremity  -BP     Right Lower Extremity (Weight-bearing Status)  weight-bearing as tolerated (WBAT)  -BP     Row Name 10/09/19 1100          Bed Mobility Assessment/Treatment    Bed Mobility Assessment/Treatment  supine-sit;sit-supine  -BP     Supine-Sit [place] (Bed Mobility)  minimum assist (75% patient effort);verbal cues  -BP     Sit-Supine [place] (Bed Mobility)  supervision  -BP     Bed Mobility, Safety Issues  decreased use of legs for bridging/pushing  -BP     Assistive Device (Bed Mobility)  bed rails;head of bed elevated  -BP     Comment (Bed Mobility)  Patient required assist with supine to sit transfer due to increased pain.   -BP     Row Name 10/09/19 1100          Transfer Assessment/Treatment    Transfer Assessment/Treatment  stand-sit transfer;sit-stand transfer  -BP     Comment (Transfers)  Patient requires verbal cues for hand placement.   -BP     Sit-Stand [place] (Transfers)  maximum assist (25% patient effort);verbal cues  -BP     Stand-Sit [place] (Transfers)  stand by assist;verbal cues  -BP     Row Name 10/09/19 1100          Sit-Stand Transfer    Assistive Device (Sit-Stand Transfers)  walker, front-wheeled  -BP     Row Name 10/09/19 1100          Stand-Sit Transfer    Assistive Device (Stand-Sit Transfers)  walker, front-wheeled  -BP     Row Name 10/09/19 1100          Gait/Stairs Assessment/Training    [place] Level (Gait)  contact guard;verbal cues  -BP     Assistive Device (Gait)  walker, front-wheeled  -BP     Distance in Feet (Gait)  8  -BP     Pattern (Gait)  swing-to  -BP     Deviations/Abnormal Patterns (Gait)  gilmer decreased;stride length decreased  -BP     Bilateral Gait Deviations  forward flexed posture  -BP     Comment (Gait/Stairs)  Patient requires cues for sequencing and safety with use of device. She  demonstrates significant decreased gilmer due to increased R LE pain during stance. Knee immobilizer donned.   -BP     Row Name 10/09/19 1100          General ROM    GENERAL ROM COMMENTS  B UE AROM WFL. L LE AROM WFL. R ankle AROM WFL.   -BP     Row Name 10/09/19 1100          MMT (Manual Muscle Testing)    General MMT Comments  B UE strength functional. L LE gross MMT 4+/5.   -BP     Row Name 10/09/19 1100          Plan of Care Review    Plan of Care Reviewed With  patient  -BP     Row Name 10/09/19 1100          Physical Therapy Clinical Impression    Date of Referral to PT  10/09/19  -BP     PT Diagnosis (PT Clinical Impression)  Decreased functional mobility   -BP     Criteria for Skilled Interventions Met (PT Clinical Impression)  yes;treatment indicated  -BP     Rehab Potential (PT Clinical Summary)  good, to achieve stated therapy goals  -BP     Predicted Duration of Therapy (PT)  3 days   -BP     Care Plan Review (PT)  evaluation/treatment results reviewed;care plan/treatment goals reviewed;risks/benefits reviewed  -BP     Care Plan Review, Other Participant (PT Clinical Impression)  spouse  -BP     Row Name 10/09/19 1100          Physical Therapy Goals    Bed Mobility Goal Selection (PT)  bed mobility, PT goal 1  -BP     Transfer Goal Selection (PT)  transfer, PT goal 1  -BP     Gait Training Goal Selection (PT)  gait training, PT goal 1  -BP     Stairs Goal Selection (PT)  stairs, PT goal 1  -BP     Additional Documentation  Stairs Goal Selection (PT) (Row)  -BP     Row Name 10/09/19 1100          Bed Mobility Goal 1 (PT)    Activity/Assistive Device (Bed Mobility Goal 1, PT)  bed mobility activities, all  -BP     [place] Level/Cues Needed (Bed Mobility Goal 1, PT)  supervision required  -BP     Time Frame (Bed Mobility Goal 1, PT)  3 days  -BP     Progress/Outcomes (Bed Mobility Goal 1, PT)  goal ongoing  -BP     Row Name 10/09/19 1100          Transfer Goal 1 (PT)    Activity/Assistive Device  (Transfer Goal 1, PT)  sit-to-stand/stand-to-sit;bed-to-chair/chair-to-bed;walker, rolling  -BP     [place] Level/Cues Needed (Transfer Goal 1, PT)  standby assist  -BP     Time Frame (Transfer Goal 1, PT)  3 days  -BP     Progress/Outcome (Transfer Goal 1, PT)  goal ongoing  -BP     Row Name 10/09/19 1100          Gait Training Goal 1 (PT)    Activity/Assistive Device (Gait Training Goal 1, PT)  gait (walking locomotion);walker, rolling  -BP     [place] Level (Gait Training Goal 1, PT)  standby assist  -BP     Distance (Gait Goal 1, PT)  50  -BP     Time Frame (Gait Training Goal 1, PT)  3 days  -BP     Progress/Outcome (Gait Training Goal 1, PT)  goal ongoing  -BP     Row Name 10/09/19 1100          Stairs Goal 1 (PT)    Activity/Assistive Device (Stairs Goal 1, PT)  ascending stairs;descending stairs  -BP     [place] Level/Cues Needed (Stairs Goal 1, PT)  contact guard assist  -BP     Number of Stairs (Stairs Goal 1, PT)  2 no handrails   -BP     Time Frame (Stairs Goal 1, PT)  3 days  -BP     Progress/Outcome (Stairs Goal 1, PT)  goal ongoing  -BP     Row Name 10/09/19 1100          Positioning and Restraints    Pre-Treatment Position  in bed  -BP     Post Treatment Position  bed  -BP     In Bed  notified nsg;supine;call light within reach;encouraged to call for assist;with family/caregiver  -BP       User Key  (r) = Recorded By, (t) = Taken By, (c) = Cosigned By    Initials Name Provider Type    BP Crow Allen PT Physical Therapist        Physical Therapy Education     Title: PT OT SLP Therapies (In Progress)     Topic: Physical Therapy (In Progress)     Point: Mobility training (Done)     Learning Progress Summary           Patient Acceptance, E,TB, VU by BP at 10/9/2019 11:40 AM   Significant Other Acceptance, E,TB, VU by BP at 10/9/2019 11:40 AM                               User Key     Initials Effective Dates Name Provider Type [facility] 04/03/18 -  Crow Allen, PT  Physical Therapist PT              PT Recommendation and Plan  Anticipated Discharge Disposition (PT): home with home health  Planned Therapy Interventions (PT Eval): bed mobility training, gait training, stair training, patient/family education, transfer training  Therapy Frequency (PT Clinical Impression): daily  Outcome Summary/Treatment Plan (PT)  Anticipated Discharge Disposition (PT): home with home health  Plan of Care Reviewed With: patient  Outcome Summary: PT Evaluation Complete: Patient performs supine to sit transfer with min A, sit to stand transfer with max A, stand to sit with SBA and gait x 8 feet with CGA with use of FWW and knee immobilizer donned. Patients mobility limited due to significant pain during transfers and gait. Patient would benefit from skilled PT services while in the hospital to maximize functional mobility and safety prior to return home. Recommend home health PT at discharge.   Outcome Measures     Row Name 10/09/19 1040             How much help from another person do you currently need...    Turning from your back to your side while in flat bed without using bedrails?  3  -BP      Moving from lying on back to sitting on the side of a flat bed without bedrails?  3  -BP      Moving to and from a bed to a chair (including a wheelchair)?  3  -BP      Standing up from a chair using your arms (e.g., wheelchair, bedside chair)?  2  -BP      Climbing 3-5 steps with a railing?  2  -BP      To walk in hospital room?  3  -BP      AM-PAC 6 Clicks Score (PT)  16  -BP         Functional Assessment    Outcome Measure Options  AM-PAC 6 Clicks Basic Mobility (PT)  -BP        User Key  (r) = Recorded By, (t) = Taken By, (c) = Cosigned By    Initials Name Provider Type    BP Crow Allen, PT Physical Therapist         Time Calculation:   PT Charges     Row Name 10/09/19 1143             Time Calculation    Start Time  1040  -BP      Stop Time  1105  -BP      Time Calculation (min)  25 min   -BP      PT Received On  10/09/19  -BP      PT - Next Appointment  10/10/19  -BP        User Key  (r) = Recorded By, (t) = Taken By, (c) = Cosigned By    Initials Name Provider Type    Crow Mejía, PT Physical Therapist        Therapy Charges for Today     Code Description Service Date Service Provider Modifiers Qty    [id number] HC PT EVAL LOW COMPLEXITY 2 10/9/2019 Crow Allen, PT GP 1          PT G-Codes  Outcome Measure Options: AM-PAC 6 Clicks Basic Mobility (PT)  AM-PAC 6 Clicks Score (PT): 16      Crow Allen, PT  10/9/2019

## 2019-10-09 NOTE — NURSING NOTE
Continued Stay Note  [address]     Patient Name: Lisa Gavin  MRN: [id number]  Today's Date: 10/9/2019    Admit Date: 10/7/2019    Discharge Plan     Row Name 10/09/19 1529       Plan    Plan Comments  F/U with pt and her  in room.  Discussed d/c plan with pt.  Pt anticipates d/c home with HH.  List of agencies given to pt, she plans to discuss her preference with her daughters and let us know which agency to use.  No other needs at this time.  CM will continue to follow.         Discharge Codes    No documentation.             Silvestre Murphy RN

## 2019-10-09 NOTE — CONSULTS
Orthopedic Consult      Patient: Lisa Gavin    Date of Admission: 10/7/2019 11:17 PM    YOB: 1950    Medical Record Number: [id number]    Attending Physician: Eugenia Kent MD  Consulting Physician: Dr. Sheldon Reid      Chief Complaints: Inadequate pain control [R52]  Fall, initial encounter [W19.XXXA]  Right hamstring strain, initial encounter [S76.311A]      History of Present Illness: 69 y.o. female admitted to [facility] to services of Eugenia Kent MD with Inadequate pain control [R52]  Fall, initial encounter [W19.XXXA]  Right hamstring strain, initial encounter [S76.311A]. I was consulted for further evaluation and treatment. Onset of symptoms was 2 days ago on Monday evening when patient states that she fell at home, she did not land on her right posterior thigh but noted immediate onset of pain in this region following her fall.  Denies any radiation of the pain below the level of the knee, describes pain is aching in nature, exacerbated with straightening her knee and flexing her hip as much as possible, rates pain as a 7-8 out of 10.  It is exacerbated with getting up from a seated position as well as with walking and weightbearing.  Denies any numbness or tingling extending down into her right leg.  Mild improvement with bed rest.  Denies prior issues with right lower extremity.  She denies any significant pain localized to the right groin region or to the right knee.  She has had prior issues with her left knee requiring injections but no current pain to the left knee at this time.     Allergies   Allergen Reactions   • Other        Medications:   Home Medications:  Medications Prior to Admission   Medication Sig Dispense Refill Last Dose   • citalopram (CeleXA) 20 MG tablet Take 2 tablets by mouth 2 (two) times a day.   10/7/2019 at 0900   • furosemide (LASIX) 40 MG tablet Take 1 tablet by mouth Daily. PATIENT STATES SHE TAKES IT EVERY OTHER DAY    10/7/2019 at 1200   • glyBURIDE-metFORMIN (GLUCOVANCE) 5-500 MG per tablet Take 2 tablets by mouth 2 (two) times a day.   10/7/2019 at 0900   • Insulin Glargine 100 UNIT/ML solution pen-injector Inject 58 Units under the skin into the appropriate area as directed 2 (Two) Times a Day.   10/7/2019 at 0600   • methIMAzole (TAPAZOLE) 5 MG tablet TAKE ONE TABLET BY MOUTH DAILY 30 tablet 0 10/7/2019 at 0900   • metoprolol tartrate (LOPRESSOR) 50 MG tablet    10/7/2019 at 0900   • naproxen (NAPROSYN) 500 MG tablet Take 1 tablet by mouth 2 (two) times a day.   10/7/2019 at 0900   • pantoprazole (PROTONIX) 40 MG EC tablet Take 1 tablet by mouth daily.   10/7/2019 at 0900   • rivaroxaban (XARELTO) tablet Take 1 tablet by mouth daily.   10/6/2019 at 2000       Current Medications:  Scheduled Meds:  citalopram 40 mg Oral Daily   insulin aspart 0-14 Units Subcutaneous 4x Daily AC & at Bedtime   insulin detemir 30 Units Subcutaneous Q12H   methIMAzole 5 mg Oral Daily   metoprolol tartrate 50 mg Oral Q12H   pantoprazole 40 mg Oral Daily   sodium chloride 10 mL Intravenous Q12H     Continuous Infusions:   PRN Meds:.dextrose  •  dextrose  •  glucagon (human recombinant)  •  HYDROcodone-acetaminophen  •  HYDROmorphone **AND** naloxone  •  melatonin  •  ondansetron **OR** ondansetron  •  sennosides-docusate sodium  •  sodium chloride  •  sodium chloride       Past Medical History:   Diagnosis Date   • Arthritis    • Depression    • GERD (gastroesophageal reflux disease)    • Headache    • Sleep apnea    • Type 2 diabetes mellitus (CMS/HCC)         Past Surgical History:   Procedure Laterality Date   • CHOLECYSTECTOMY     • HYSTERECTOMY     • ROTATOR CUFF REPAIR      ACUTE   • SHOULDER SURGERY Bilateral     hAS HAD ROTATOR CUFF SURGERY ON BOTH SHOULDERS        Social History     Occupational History   • Not on file   Tobacco Use   • Smoking status: Never Smoker   • Smokeless tobacco: Never Used   Substance and Sexual Activity   •  Alcohol use: No   • Drug use: No   • Sexual activity: Defer    Social History     Social History Narrative   • Not on file        Family History   Problem Relation Age of Onset   • Cancer Other    • Diabetes Other    • Glaucoma Other    • Rheum arthritis Other    • Kidney disease Other          Review of Systems:   HEENT: Patient denies any headaches, vision changes, change in hearing, or tinnitus, Patient denies any rhinorrhea,epistaxis, sinus pain, mouth or dental problems, sore throat or hoarseness, or dysphagia  Pulmonary: Patient denies any cough, congestion, SOA, or wheezing  Cardiovascular: Patient denies any chest pain, dyspnea, palpitations, weakness, intolerance of exercise, varicosities, swelling of extremities, known murmur  Gastrointestinal:  Patient denies nausea, vomiting, diarrhea, constipation, loss  of appetite, change in appetite, dysphagia, gas, heartburn, melena, change in bowel habits, use of laxatives or other drugs to alter the function of the gastrointestinal tract.  Genital/Urinary: Patient denies dysuria, change in color of urine, change in frequency of urination, pain with urgency, incontinence, retention, or nocturia.  Musculoskeletal: Patient denies increased warmth; redness; or swelling of joints; limitation of function; deformity; crepitation: notes pain in right posterior thigh as documented above in HPI.  Denies pain in low back or neck.    Neurological: Patient denies dizziness, tremor, ataxia, difficulty in speaking, change in speech, paresthesia, loss of sensation, seizures, syncope, changes in memory.  Endocrine system: Patient denies tremors, palpitations, intolerance of heat or cold, polyuria, polydipsia, polyphagia, diaphoresis, exophthalmos, or goiter.  Psychological: Patient denies thoughts/plans or harming self or other; depression,  insomnia, night terrors, hu, memory loss, disorientation.  Skin: Patient denies any bruising, rashes, discoloration, pruritus, wounds,  ulcers, decubiti, changes in the hair or nails  Hematopoietic: Patient denies history of spontaneous or excessive bleeding, epistaxis, hematuria, melena, fatigue, enlarged or tender lymph nodes, pallor, history of anemia.    Physical Exam: 69 y.o. female  Vitals:    10/08/19 1945 10/09/19 0011 10/09/19 0602 10/09/19 0604   BP: 122/70 107/69 99/60 105/64   BP Location: Left arm  Comment: L forearm Left arm Left arm Left arm  Comment: L forearm   Patient Position: Lying Lying Lying Lying   Pulse: 68 60 66    Resp: 18 16 16    Temp: 97.5 °F (36.4 °C) 97.7 °F (36.5 °C) 97.6 °F (36.4 °C)    TempSrc: Oral Axillary Oral    SpO2: 95% 94% 96%    Weight:       Height:         General Appearance:    Alert, cooperative, in no acute distress                      Head:    Normocephalic, without obvious abnormality, atraumatic   Eyes:            Lids and lashes normal, conjunctivae and sclerae normal, no   icterus, no pallor, corneas clear, PERRLA   Ears:    Ears appear intact with no abnormalities noted   Throat:   No oral lesions, no thrush, oral mucosa moist   Neck:   No adenopathy, supple, trachea midline, no thyromegaly, no   carotid bruit, no JVD   Back:     No kyphosis present, no scoliosis present, no skin lesions,      erythema or scars, no tenderness to percussion or                   palpation,   range of motion normal   Lungs:     Clear to auscultation,respirations regular, even and                  unlabored    Heart:    Regular rhythm and normal rate, normal S1 and S2, no            murmur, no gallop, no rub, no click   Chest Wall:    No abnormalities observed   Abdomen:     Normal bowel sounds, no masses, no organomegaly, soft        non-tender, non-distended, no guarding, no rebound                tenderness   Rectal:     Deferred   Extremities:   Tenderness noted at right posterior thigh in the hamstring muscle belly extending into the proximal aspect of the hamstring but does not extend into the origin on the  ischial tuberosity.  No pain on seated position noted.  Positive pain on straight leg raise localized to the posterior thigh region but no radiation of pain below the level of the knee.  Increased pain on resisted knee flexion with 4 out of 5 strength compared to 4+ out of 5 strength on knee flexion on the left side.  4+ out of 5 strength in dorsiflexion plantarflexion right ankle as well as flexion-extension all toes the right foot.  Positive sensation light touch on distributions right foot symmetric to the left.  No hip pain on logroll or Stinchfield exam, no pain to the groin region or over the trochanteric bursa.  No medial or lateral joint line pain to the right knee with right knee range of motion from 3 to 120 degrees and minimal effusion noted.  Moves all remaining extremities well, no edema, no cyanosis, no redness   Pulses:   Pulses palpable and equal bilaterally   Skin:   No bleeding, bruising or rash   Lymph nodes:   No palpable adenopathy   Neurologic:   Cranial nerves 2 - 12 grossly intact, sensation intact, DTR       present and equal bilaterally           Diagnostic Tests:  Admission on 10/07/2019   Component Date Value Ref Range Status   • Glucose 10/08/2019 170* 65 - 99 mg/dL Final   • BUN 10/08/2019 25* 8 - 23 mg/dL Final   • Creatinine 10/08/2019 1.04* 0.57 - 1.00 mg/dL Final   • Sodium 10/08/2019 138  136 - 145 mmol/L Final   • Potassium 10/08/2019 4.5  3.5 - 5.2 mmol/L Final   • Chloride 10/08/2019 102  98 - 107 mmol/L Final   • CO2 10/08/2019 24.7  22.0 - 29.0 mmol/L Final   • Calcium 10/08/2019 9.7  8.6 - 10.5 mg/dL Final   • Total Protein 10/08/2019 6.9  6.0 - 8.5 g/dL Final   • Albumin 10/08/2019 3.70  3.50 - 5.20 g/dL Final   • ALT (SGPT) 10/08/2019 23  1 - 33 U/L Final   • AST (SGOT) 10/08/2019 24  1 - 32 U/L Final   • Alkaline Phosphatase 10/08/2019 83  39 - 117 U/L Final   • Total Bilirubin 10/08/2019 0.2  0.2 - 1.2 mg/dL Final   • eGFR Non African Amer 10/08/2019 53* >60 mL/min/1.73  Final   • Globulin 10/08/2019 3.2  gm/dL Final   • A/G Ratio 10/08/2019 1.2  g/dL Final   • BUN/Creatinine Ratio 10/08/2019 24.0  7.0 - 25.0 Final   • Anion Gap 10/08/2019 11.3  5.0 - 15.0 mmol/L Final   • WBC 10/08/2019 7.24  3.40 - 10.80 10*3/mm3 Final   • RBC 10/08/2019 3.23* 3.77 - 5.28 10*6/mm3 Final   • Hemoglobin 10/08/2019 10.4* 12.0 - 15.9 g/dL Final   • Hematocrit 10/08/2019 31.1* 34.0 - 46.6 % Final   • MCV 10/08/2019 96.3  79.0 - 97.0 fL Final   • MCH 10/08/2019 32.2  26.6 - 33.0 pg Final   • MCHC 10/08/2019 33.4  31.5 - 35.7 g/dL Final   • RDW 10/08/2019 13.1  12.3 - 15.4 % Final   • RDW-SD 10/08/2019 46.4  37.0 - 54.0 fl Final   • MPV 10/08/2019 11.3  6.0 - 12.0 fL Final   • Platelets 10/08/2019 217  140 - 450 10*3/mm3 Final   • Neutrophil % 10/08/2019 62.1  42.7 - 76.0 % Final   • Lymphocyte % 10/08/2019 26.1  19.6 - 45.3 % Final   • Monocyte % 10/08/2019 6.9  5.0 - 12.0 % Final   • Eosinophil % 10/08/2019 3.9  0.3 - 6.2 % Final   • Basophil % 10/08/2019 0.4  0.0 - 1.5 % Final   • Immature Grans % 10/08/2019 0.6* 0.0 - 0.5 % Final   • Neutrophils, Absolute 10/08/2019 4.50  1.70 - 7.00 10*3/mm3 Final   • Lymphocytes, Absolute 10/08/2019 1.89  0.70 - 3.10 10*3/mm3 Final   • Monocytes, Absolute 10/08/2019 0.50  0.10 - 0.90 10*3/mm3 Final   • Eosinophils, Absolute 10/08/2019 0.28  0.00 - 0.40 10*3/mm3 Final   • Basophils, Absolute 10/08/2019 0.03  0.00 - 0.20 10*3/mm3 Final   • Immature Grans, Absolute 10/08/2019 0.04  0.00 - 0.05 10*3/mm3 Final   • nRBC 10/08/2019 0.0  0.0 - 0.2 /100 WBC Final   • Glucose 10/08/2019 212* 70 - 130 mg/dL Final   • Glucose 10/08/2019 175* 65 - 99 mg/dL Final   • BUN 10/08/2019 22  8 - 23 mg/dL Final   • Creatinine 10/08/2019 0.92  0.57 - 1.00 mg/dL Final   • Sodium 10/08/2019 139  136 - 145 mmol/L Final   • Potassium 10/08/2019 4.6  3.5 - 5.2 mmol/L Final   • Chloride 10/08/2019 103  98 - 107 mmol/L Final   • CO2 10/08/2019 24.7  22.0 - 29.0 mmol/L Final   • Calcium  10/08/2019 9.6  8.6 - 10.5 mg/dL Final   • eGFR Non  Amer 10/08/2019 61  >60 mL/min/1.73 Final   • BUN/Creatinine Ratio 10/08/2019 23.9  7.0 - 25.0 Final   • Anion Gap 10/08/2019 11.3  5.0 - 15.0 mmol/L Final   • WBC 10/08/2019 7.80  3.40 - 10.80 10*3/mm3 Final   • RBC 10/08/2019 3.23* 3.77 - 5.28 10*6/mm3 Final   • Hemoglobin 10/08/2019 10.3* 12.0 - 15.9 g/dL Final   • Hematocrit 10/08/2019 31.3* 34.0 - 46.6 % Final   • MCV 10/08/2019 96.9  79.0 - 97.0 fL Final   • MCH 10/08/2019 31.9  26.6 - 33.0 pg Final   • MCHC 10/08/2019 32.9  31.5 - 35.7 g/dL Final   • RDW 10/08/2019 13.2  12.3 - 15.4 % Final   • RDW-SD 10/08/2019 47.2  37.0 - 54.0 fl Final   • MPV 10/08/2019 11.1  6.0 - 12.0 fL Final   • Platelets 10/08/2019 215  140 - 450 10*3/mm3 Final   • Neutrophil % 10/08/2019 61.6  42.7 - 76.0 % Final   • Lymphocyte % 10/08/2019 26.8  19.6 - 45.3 % Final   • Monocyte % 10/08/2019 7.4  5.0 - 12.0 % Final   • Eosinophil % 10/08/2019 3.2  0.3 - 6.2 % Final   • Basophil % 10/08/2019 0.5  0.0 - 1.5 % Final   • Immature Grans % 10/08/2019 0.5  0.0 - 0.5 % Final   • Neutrophils, Absolute 10/08/2019 4.80  1.70 - 7.00 10*3/mm3 Final   • Lymphocytes, Absolute 10/08/2019 2.09  0.70 - 3.10 10*3/mm3 Final   • Monocytes, Absolute 10/08/2019 0.58  0.10 - 0.90 10*3/mm3 Final   • Eosinophils, Absolute 10/08/2019 0.25  0.00 - 0.40 10*3/mm3 Final   • Basophils, Absolute 10/08/2019 0.04  0.00 - 0.20 10*3/mm3 Final   • Immature Grans, Absolute 10/08/2019 0.04  0.00 - 0.05 10*3/mm3 Final   • nRBC 10/08/2019 0.0  0.0 - 0.2 /100 WBC Final   • Hemoglobin A1C 10/08/2019 7.10* 4.80 - 5.60 % Final   • TSH 10/08/2019 4.620* 0.270 - 4.200 uIU/mL Final   • Glucose 10/08/2019 194* 70 - 130 mg/dL Final   • Glucose 10/08/2019 142* 70 - 130 mg/dL Final   • Glucose 10/08/2019 184* 70 - 130 mg/dL Final   • Glucose 10/08/2019 232* 70 - 130 mg/dL Final   • Glucose 10/09/2019 86  65 - 99 mg/dL Final   • BUN 10/09/2019 21  8 - 23 mg/dL Final   •  Creatinine 10/09/2019 0.90  0.57 - 1.00 mg/dL Final   • Sodium 10/09/2019 141  136 - 145 mmol/L Final   • Potassium 10/09/2019 4.6  3.5 - 5.2 mmol/L Final   • Chloride 10/09/2019 105  98 - 107 mmol/L Final   • CO2 10/09/2019 22.7  22.0 - 29.0 mmol/L Final   • Calcium 10/09/2019 9.0  8.6 - 10.5 mg/dL Final   • eGFR Non African Amer 10/09/2019 62  >60 mL/min/1.73 Final   • BUN/Creatinine Ratio 10/09/2019 23.3  7.0 - 25.0 Final   • Anion Gap 10/09/2019 13.3  5.0 - 15.0 mmol/L Final   • Glucose 10/09/2019 83  70 - 130 mg/dL Final     Xr Femur 2 View Right    Result Date: 10/8/2019  Impression: 1. Degenerative change of the right hip and right knee, otherwise negative. Signer Name: Marco A Sánchez MD  Signed: 10/8/2019 1:09 AM  Workstation Name: [facility]  [facility]iology [facility]    Xr Knee 3 View Left    Result Date: 9/22/2019  Impression: Degenerative changes of the left knee. No fracture. Clinical aspects of the case will determine if MR of the left knee could provide additional information. Signer Name: Stiven Robins MD  Signed: 9/22/2019 3:38 PM  Workstation Name: [facility]    Ct Head Without Contrast    Result Date: 10/8/2019  Impression: Normal, negative unenhanced head CT. Signer Name: Marco A Sánchez MD  Signed: 10/8/2019 12:12 AM  Workstation Name: [facility]  [facility]    Xr Spine Lumbar Ap & Lateral    Result Date: 9/22/2019  Impression: 1.  No clearly acute findings demonstrated by conventional radiograph. 2.  Lumbar spondylosis as described. 3.  Clinical aspects of the case will determine if MR of the lumbar spine could provide additional information. Signer Name: Stiven Robins MD  Signed: 9/22/2019 3:36 PM  Workstation Name: [facility]  [facility]    Xr Hip With Or Without Pelvis 2 - 3 View Right    Result Date: 10/8/2019  Impression: Degenerative changes of the right hip. No fracture. Signer Name:  Marco A Sánchez MD  Signed: 10/8/2019 12:08 AM  Workstation Name: [facility]  [facility]      Assessment:  Patient Active Problem List   Diagnosis   • Hyperthyroidism   • Thyroid nodule   • Fatigue   • Abnormal weight gain   • Uncontrolled type 2 diabetes mellitus (CMS/HCC)   • Inadequate pain control   • Acid reflux   • Paroxysmal atrial fibrillation (CMS/HCC)   • Fall at home   • Posterior pain of right hip           Plan:  The patient voiced understanding of the risks, benefits, and alternative forms of treatment that were discussed and the patient consents to proceed with conservative treatment with recommendation for Medrol Dosepak to be followed by meloxicam for 2-week treatment course.  I would also recommend evaluation by physical therapy.  I have ordered a knee immobilizer for assistance with ambulation in order to take the stress off of the hamstring muscle in support of gait.  She can utilize a walker for assistance in mobilization.  Follow-up in 4 weeks for reevaluation in the office.  No symptoms localizing to either her hip or knee at this time.  She was in agreement with this plan and all questions answered..       Sheldon Reid MD      Dictated utilizing Dragon dictation

## 2019-10-09 NOTE — PROGRESS NOTES
"Hospitalist Team      Patient Care Team:  Edith Chávez PA as PCP - General (Physician Assistant)        Chief Complaint: Follow-up leg pain    Subjective    Quite painful when working with PT today.  Otherwise, she is w/o complaints.  Specifically, she denies chest pain and dyspnea.    Objective    Vital Signs  Temp:  [97 °F (36.1 °C)-97.7 °F (36.5 °C)] 97.6 °F (36.4 °C)  Heart Rate:  [60-70] 66  Resp:  [16-18] 16  BP: ()/(60-70) 105/64  Oxygen Therapy  SpO2: 96 %  Pulse Oximetry Type: Continuous  Device (Oxygen Therapy): room air}    Flowsheet Rows      First Filed Value   Admission Height  162.6 cm (64\") Documented at 10/07/2019 2317   Admission Weight  102 kg (224 lb 9.6 oz) Documented at 10/07/2019 2317        Physical Exam:    General: Appears stated age in NAD  Lungs: Clear throughout all fields.  Normal excursion.  CV: Regular w/o murmur.  Radial and Pedal pulses are 2+ and symmetric.  Abdomen: Obese, soft, and non-tender w/ active bowel sounds  MSK: No C/C.  No asymmetric edema.  Neuro: CN II-XII grossly intact  Psych: Normal affect.  AAOx3.    Results Review:     I reviewed the patient's new clinical results.    Lab Results (last 24 hours)     Procedure Component Value Units Date/Time    POC Glucose Once [[id number]]  (Normal) Collected:  10/09/19 0750    Specimen:  Blood Updated:  10/09/19 0756     Glucose 83 mg/dL     Basic Metabolic Panel [[id number]]  (Normal) Collected:  10/09/19 0438    Specimen:  Blood Updated:  10/09/19 0645     Glucose 86 mg/dL      BUN 21 mg/dL      Creatinine 0.90 mg/dL      Sodium 141 mmol/L      Potassium 4.6 mmol/L      Chloride 105 mmol/L      CO2 22.7 mmol/L      Calcium 9.0 mg/dL      eGFR Non African Amer 62 mL/min/1.73      BUN/Creatinine Ratio 23.3     Anion Gap 13.3 mmol/L     Narrative:       GFR Normal >60  Chronic Kidney Disease <60  Kidney Failure <15    POC Glucose Once [[id number]]  (Abnormal) Collected:  10/08/19 2039    Specimen:  Blood Updated:  " 10/08/19 2045     Glucose 232 mg/dL     POC Glucose Once [[id number]]  (Abnormal) Collected:  10/08/19 1637    Specimen:  Blood Updated:  10/08/19 1644     Glucose 184 mg/dL           Imaging Results (last 24 hours)     ** No results found for the last 24 hours. **            Medication Review:   I have reviewed the patient's current medication list    Current Facility-Administered Medications:   •  citalopram (CeleXA) tablet 40 mg, 40 mg, Oral, Daily, Eugenia Kent MD, 40 mg at 10/09/19 0805  •  dextrose (D50W) 25 g/ 50mL Intravenous Solution 25 g, 25 g, Intravenous, Q15 Min PRN, Eugenia Kent MD  •  dextrose (GLUTOSE) oral gel 15 g, 15 g, Oral, Q15 Min PRN, Eugenia Kent MD  •  glucagon (GLUCAGEN) injection 1 mg, 1 mg, Subcutaneous, Q15 Min PRN, Eugenia Kent MD  •  HYDROcodone-acetaminophen (NORCO) 7.5-325 MG per tablet 1 tablet, 1 tablet, Oral, Q4H PRN, Eugenia Kent MD, 1 tablet at 10/09/19 0803  •  HYDROmorphone (DILAUDID) injection 0.5 mg, 0.5 mg, Intravenous, Q2H PRN, 0.5 mg at 10/09/19 1132 **AND** naloxone (NARCAN) injection 0.4 mg, 0.4 mg, Intravenous, Q5 Min PRN, Eugenia Kent MD  •  insulin aspart (novoLOG) injection 0-14 Units, 0-14 Units, Subcutaneous, 4x Daily AC & at Bedtime, Eugenia Kent MD, 5 Units at 10/08/19 2111  •  insulin detemir (LEVEMIR) injection 30 Units, 30 Units, Subcutaneous, Q12H, Eugenia Kent MD, 30 Units at 10/09/19 0952  •  melatonin tablet 5 mg, 5 mg, Oral, Nightly PRN, Eugenia Kent MD, 5 mg at 10/08/19 2111  •  methIMAzole (TAPAZOLE) tablet 5 mg, 5 mg, Oral, Daily, Eugenia eKnt MD, 5 mg at 10/09/19 0808  •  methylPREDNISolone (MEDROL (DANNY)) tablet 4 mg, 4 mg, Oral, After Lunch, Sheldon Reid MD  •  methylPREDNISolone (MEDROL (DANNY)) tablet 4 mg, 4 mg, Oral, After Dinner, Sheldon Reid MD  •  [START ON 10/10/2019] methylPREDNISolone (MEDROL (DANNY)) tablet 4 mg, 4 mg, Oral, TID Around Food,  Sheldon Reid MD  •  [START ON 10/11/2019] methylPREDNISolone (MEDROL (DANNY)) tablet 4 mg, 4 mg, Oral, 4x Daily Taper, Sheldon Reid MD  •  [START ON 10/12/2019] methylPREDNISolone (MEDROL (DANNY)) tablet 4 mg, 4 mg, Oral, TID Around Food, Sheldon Reid MD  •  [START ON 10/13/2019] methylPREDNISolone (MEDROL (DANNY)) tablet 4 mg, 4 mg, Oral, Before Breakfast, Sheldon Reid MD  •  [START ON 10/13/2019] methylPREDNISolone (MEDROL (DANNY)) tablet 4 mg, 4 mg, Oral, TonightFranklin Nicholas A, MD  •  [START ON 10/14/2019] methylPREDNISolone (MEDROL (DANNY)) tablet 4 mg, 4 mg, Oral, Before Breakfast, Sheldon Reid MD  •  methylPREDNISolone (MEDROL (DANNY)) tablet 8 mg, 8 mg, Oral, Franklin Morton Nicholas A, MD  •  [START ON 10/10/2019] methylPREDNISolone (MEDROL (DANNY)) tablet 8 mg, 8 mg, Oral, Franklin Morton Nicholas A, MD  •  metoprolol tartrate (LOPRESSOR) tablet 50 mg, 50 mg, Oral, Q12H, Eugenia Kent MD, 50 mg at 10/09/19 0808  •  ondansetron (ZOFRAN) tablet 4 mg, 4 mg, Oral, Q6H PRN **OR** ondansetron (ZOFRAN) injection 4 mg, 4 mg, Intravenous, Q6H PRN, Eugenia Kent MD  •  pantoprazole (PROTONIX) EC tablet 40 mg, 40 mg, Oral, Daily, Eugenia Kent MD, 40 mg at 10/09/19 0808  •  sennosides-docusate sodium (SENOKOT-S) 8.6-50 MG tablet 2 tablet, 2 tablet, Oral, Nightly PRN, Eugenia Kent MD  •  sodium chloride 0.9 % flush 10 mL, 10 mL, Intravenous, PRN, Eugenia Kent MD  •  sodium chloride 0.9 % flush 10 mL, 10 mL, Intravenous, Q12H, Eugenia Kent MD, 10 mL at 10/09/19 0809  •  sodium chloride 0.9 % infusion 40 mL, 40 mL, Intravenous, PRN, Eugenia Kent MD      Assessment/Plan     1.  Suspected Right Hamstring Injury:  Appreciate Ortho help.  Reviewed PT note and evaluation.  Will continue to monitor as steroid therapy initiated.  PT will continue to see.    2.  Diabetes Mellitus, Type 2 in Obese: Will increase basal dose back to home given  the steroid initiation.  Good PO intake.  Glucose was at goal this AM.    3.  Hyperthyroidism: TSH elevated a little above normal cutoff.  Will continue current dose of Tapazole.    4.  PAF: Regular on exam.  Xarelto on hold.  I'm starting her on prophylactic Lovenox given the risk of hematoma formation.  The risk will increase with the addition of Mobic later on.  I'm hopeful that enough healing will take place prior to that so risks will be minimal.  Reviewed this with patient and  in detail.  Continue Lopressor.    5.  KRISTIN: May continue w/ device.  Nothing acute.    Plan for disposition: Home soon.    Mando Rosado MD  10/09/19  12:11 PM

## 2019-10-09 NOTE — PLAN OF CARE
Problem: Patient Care Overview  Goal: Plan of Care Review   10/09/19 1140   Coping/Psychosocial   Plan of Care Reviewed With patient   OTHER   Outcome Summary PT Evaluation Complete: Patient performs supine to sit transfer with min A, sit to stand transfer with max A, stand to sit with SBA and gait x 8 feet with CGA with use of FWW and knee immobilizer donned. Patients mobility limited due to significant pain during transfers and gait. Patient would benefit from skilled PT services while in the hospital to maximize functional mobility and safety prior to return home. Recommend home health PT at discharge.

## 2019-10-09 NOTE — PLAN OF CARE
Problem: NPPV/CPAP (Adult)  Goal: Signs and Symptoms of Listed Potential Problems Will be Absent, Minimized or Managed (NPPV/CPAP)  Outcome: Ongoing (interventions implemented as appropriate)   10/09/19 0110   Goal/Outcome Evaluation   Problems Assessed (NPPV/CPAP) situational response;hypoxia/hypoxemia;dry mucous membranes;skin breakdown   Problems Present (NPPV/CPAP) none   Pt unable to bring in home device.  Contacted physician regarding need for CPAP. Received orders for auto cpap.   Pt initiated w/o issue.

## 2019-10-10 ENCOUNTER — NURSE TRIAGE (OUTPATIENT)
Dept: CALL CENTER | Facility: HOSPITAL | Age: 69
End: 2019-10-10

## 2019-10-10 VITALS
WEIGHT: 223.38 LBS | HEART RATE: 60 BPM | RESPIRATION RATE: 18 BRPM | HEIGHT: 63 IN | DIASTOLIC BLOOD PRESSURE: 64 MMHG | BODY MASS INDEX: 39.58 KG/M2 | TEMPERATURE: 97.4 F | SYSTOLIC BLOOD PRESSURE: 129 MMHG | OXYGEN SATURATION: 90 %

## 2019-10-10 LAB
ALBUMIN SERPL-MCNC: 3.5 G/DL (ref 3.5–5.2)
ALBUMIN/GLOB SERPL: 1 G/DL
ALP SERPL-CCNC: 72 U/L (ref 39–117)
ALT SERPL W P-5'-P-CCNC: 20 U/L (ref 1–33)
ANION GAP SERPL CALCULATED.3IONS-SCNC: 10 MMOL/L (ref 5–15)
AST SERPL-CCNC: 14 U/L (ref 1–32)
BILIRUB SERPL-MCNC: 0.3 MG/DL (ref 0.2–1.2)
BUN BLD-MCNC: 24 MG/DL (ref 8–23)
BUN/CREAT SERPL: 25.3 (ref 7–25)
CALCIUM SPEC-SCNC: 8.7 MG/DL (ref 8.6–10.5)
CHLORIDE SERPL-SCNC: 102 MMOL/L (ref 98–107)
CO2 SERPL-SCNC: 25 MMOL/L (ref 22–29)
CREAT BLD-MCNC: 0.95 MG/DL (ref 0.57–1)
GFR SERPL CREATININE-BSD FRML MDRD: 58 ML/MIN/1.73
GLOBULIN UR ELPH-MCNC: 3.4 GM/DL
GLUCOSE BLD-MCNC: 285 MG/DL (ref 65–99)
GLUCOSE BLDC GLUCOMTR-MCNC: 235 MG/DL (ref 70–130)
GLUCOSE BLDC GLUCOMTR-MCNC: 254 MG/DL (ref 70–130)
POTASSIUM BLD-SCNC: 5 MMOL/L (ref 3.5–5.2)
PROT SERPL-MCNC: 6.9 G/DL (ref 6–8.5)
SODIUM BLD-SCNC: 137 MMOL/L (ref 136–145)

## 2019-10-10 PROCEDURE — 94799 UNLISTED PULMONARY SVC/PX: CPT

## 2019-10-10 PROCEDURE — 97116 GAIT TRAINING THERAPY: CPT

## 2019-10-10 PROCEDURE — 82962 GLUCOSE BLOOD TEST: CPT

## 2019-10-10 PROCEDURE — 63710000001 INSULIN DETEMIR PER 5 UNITS: Performed by: HOSPITALIST

## 2019-10-10 PROCEDURE — 25010000002 ENOXAPARIN PER 10 MG: Performed by: HOSPITALIST

## 2019-10-10 PROCEDURE — G0378 HOSPITAL OBSERVATION PER HR: HCPCS

## 2019-10-10 PROCEDURE — 96372 THER/PROPH/DIAG INJ SC/IM: CPT

## 2019-10-10 PROCEDURE — 96376 TX/PRO/DX INJ SAME DRUG ADON: CPT

## 2019-10-10 PROCEDURE — 99217 PR OBSERVATION CARE DISCHARGE MANAGEMENT: CPT | Performed by: HOSPITALIST

## 2019-10-10 PROCEDURE — 63710000001 INSULIN ASPART PER 5 UNITS: Performed by: INTERNAL MEDICINE

## 2019-10-10 PROCEDURE — 80053 COMPREHEN METABOLIC PANEL: CPT | Performed by: HOSPITALIST

## 2019-10-10 PROCEDURE — 63710000001 METHYLPREDNISOLONE 4 MG TABLET THERAPY PACK 21 EACH DISP PACK: Performed by: ORTHOPAEDIC SURGERY

## 2019-10-10 RX ORDER — METHYLPREDNISOLONE 4 MG/1
TABLET ORAL
Start: 2019-10-11 | End: 2019-11-15

## 2019-10-10 RX ORDER — METHYLPREDNISOLONE 4 MG/1
TABLET ORAL
Start: 2019-10-14 | End: 2019-11-15

## 2019-10-10 RX ORDER — HYDROCODONE BITARTRATE AND ACETAMINOPHEN 7.5; 325 MG/1; MG/1
1 TABLET ORAL EVERY 4 HOURS PRN
Qty: 10 TABLET | Refills: 0 | Status: SHIPPED | OUTPATIENT
Start: 2019-10-10 | End: 2019-10-18

## 2019-10-10 RX ORDER — METHYLPREDNISOLONE 4 MG/1
TABLET ORAL
Start: 2019-10-10 | End: 2019-11-15

## 2019-10-10 RX ORDER — METHYLPREDNISOLONE 4 MG/1
TABLET ORAL
Start: 2019-10-13 | End: 2019-11-15

## 2019-10-10 RX ORDER — METHYLPREDNISOLONE 4 MG/1
TABLET ORAL
Start: 2019-10-12 | End: 2019-11-15

## 2019-10-10 RX ADMIN — INSULIN DETEMIR 40 UNITS: 100 INJECTION, SOLUTION SUBCUTANEOUS at 09:42

## 2019-10-10 RX ADMIN — HYDROCODONE BITARTRATE AND ACETAMINOPHEN 1 TABLET: 7.5; 325 TABLET ORAL at 09:48

## 2019-10-10 RX ADMIN — METHYLPREDNISOLONE 4 MG: 4 TABLET ORAL at 07:49

## 2019-10-10 RX ADMIN — SODIUM CHLORIDE, PRESERVATIVE FREE 10 ML: 5 INJECTION INTRAVENOUS at 09:43

## 2019-10-10 RX ADMIN — METOPROLOL TARTRATE 50 MG: 50 TABLET, FILM COATED ORAL at 09:41

## 2019-10-10 RX ADMIN — PANTOPRAZOLE SODIUM 40 MG: 40 TABLET, DELAYED RELEASE ORAL at 09:41

## 2019-10-10 RX ADMIN — METHIMAZOLE 5 MG: 5 TABLET ORAL at 09:41

## 2019-10-10 RX ADMIN — CITALOPRAM HYDROBROMIDE 40 MG: 20 TABLET ORAL at 09:41

## 2019-10-10 RX ADMIN — INSULIN ASPART 8 UNITS: 100 INJECTION, SOLUTION INTRAVENOUS; SUBCUTANEOUS at 07:48

## 2019-10-10 RX ADMIN — METHYLPREDNISOLONE 4 MG: 4 TABLET ORAL at 12:57

## 2019-10-10 RX ADMIN — INSULIN ASPART 5 UNITS: 100 INJECTION, SOLUTION INTRAVENOUS; SUBCUTANEOUS at 12:16

## 2019-10-10 RX ADMIN — ENOXAPARIN SODIUM 40 MG: 40 INJECTION SUBCUTANEOUS at 12:55

## 2019-10-10 NOTE — DISCHARGE SUMMARY
Lisa Gavin  1950  [id number]    Hospitalists Discharge Summary    Date of Admission: 10/7/2019  Date of Discharge:  10/10/2019    Primary Discharge Diagnoses:    1.  Suspected Right Hamstring Injury    Secondary Discharge Diagnoses:    1.  Diabetes Mellitus, Type 2 in Obese A1c of 7.1%  2.  Hyperthyroidism  3.  PAF  4.  KRISTIN    History of Present Illness (taken from H&P):    70 yo WF w/ PMH of DM 2 (On chronic insulin), KRISTIN, PAF (on NOAC), Hyperthyroidism (On methimazole), Left knee osteoarthritis, and obesity. Pt is in her usual good health. Was getting ready for bed, and while walking, doesn't fully remember if her left knee gave way, or if she tripped over her feet, but she fell on her rt hip. No LOC.   Now have constant mild posterior pain in her rt hip/upper thigh. Worse with rotation, flexion of the hip, and wt baring. Not associated with numbness and tingling.     Denies soa, cp, palpitations, vertigo, pre-syncope, tongue biting, or bowel/bladder incontinence right before her fall.     States pets were not near her, and she didn't think any thing was on the floor for her to trip. Had immediate pain, and inability to wt bear. Brought in by EMS.      Xrays negative in ER, but pt was not able to put weight on leg inorder to get home.     Is able to climb stairs and perform 4 METS without difficulties. All health conditions under good control. Recently semi-retired, and had some interruptions to medications, so last a1c was slightly high. It's 7.1 today.     Hospital Course:    Ms. Gavin was admitted to the Med/Surg unit.  She was seen in consultation by Dr. Franklin Chance and felt to have a hamstring injury.  She was started on a Medrol dosepack to transition to Meloxicam after the steroid course finished.  Pain was controlled well-enough to participate w/ PT.  She remained in NSR during her stay.    I had a long and detailed discussion w/ Ms. Gavin concerning the use of NSAIDs along w/ NOACs.   Warning signs of bleeding were discussed in detail.  All questions were answered.      PCP  Patient Care Team:  Edith Chávez PA as PCP - General (Physician Assistant)    Consults:   Consults     Date and Time Order Name Status Description    10/8/2019 0242 Inpatient Orthopedic Surgery Consult Completed           Operations and Procedures Performed:       Xr Femur 2 View Right    Result Date: 10/8/2019  Narrative: CR Femur 2 Vws RT INDICATION: 69-year-old female who fell tonight. Pain from the right hip through the mid femur. COMPARISON: None available. FINDINGS: 2 views of the right femur.  No fracture or dislocation.  No bone erosion or destruction. Articulation at the hip and knee is anatomic.  No foreign body.     Impression: 1. Degenerative change of the right hip and right knee, otherwise negative. Signer Name: Marco A Sánchez MD  Signed: 10/8/2019 1:09 AM  Workstation Name: [employer]  [facility]    Xr Knee 3 View Left    Result Date: 9/22/2019  Narrative: CR Knee 3 Vws LT INDICATION: Fall today with left knee pain COMPARISON: None available. FINDINGS: 3 view(s) of the left knee.  No fracture, dislocation, or effusion. Osteoarthritic changes noted with narrowing of the joint spaces and hypertrophic change. No lytic or blastic bone lesions. No unexpected radiopaque foreign bodies.     Impression: Degenerative changes of the left knee. No fracture. Clinical aspects of the case will determine if MR of the left knee could provide additional information. Signer Name: Stiven Robins MD  Signed: 9/22/2019 3:38 PM  Workstation Name: [email or web address]  [facility]    Ct Head Without Contrast    Result Date: 10/8/2019  Narrative: CT Head WO HISTORY: 69-year-old female who tripped and fell tonight. Does not believe she had her head but is on blood thinners. TECHNIQUE: Axial unenhanced head CT. Radiation dose reduction techniques included automated exposure control or  exposure modulation based on body size. Count of known CT and cardiac nuc med studies performed in previous 12 months: 0. Time of scan: 2359 hours COMPARISON: None. FINDINGS: No intracranial hemorrhage, mass, or infarct. No hydrocephalus or extra-axial fluid collection. There are senescent changes, including volume loss and nonspecific white matter change, but no acute abnormality is seen. The skull base, calvarium, and extracranial soft tissues are normal. Incidental chronic-appearing right maxillary sinus disease.     Impression: Normal, negative unenhanced head CT. Signer Name: Marco A Sánchez MD  Signed: 10/8/2019 12:12 AM  Workstation Name: [email or web address]  [facility]    Xr Spine Lumbar Ap & Lateral    Result Date: 9/22/2019  Narrative: CR Spine Lumbar 2 or 3 Vws INDICATION: Fall today with low back pain COMPARISON: None available. FINDINGS: 4 views of the lumbar spine. The coronal alignment shows a mild dextroscoliosis centered at L3. Vertebral body heights appear maintained. Osteophytosis noted. Disc spaces narrowed at L1-L2 and L2-L3. Facet arthropathy demonstrated. Pedicles appear intact. Sacroiliac joints are symmetric.     Impression: 1.  No clearly acute findings demonstrated by conventional radiograph. 2.  Lumbar spondylosis as described. 3.  Clinical aspects of the case will determine if MR of the lumbar spine could provide additional information. Signer Name: Stiven Robins MD  Signed: 9/22/2019 3:36 PM  Workstation Name: [email or web address]  [facility]    Xr Hip With Or Without Pelvis 2 - 3 View Right    Result Date: 10/8/2019  Narrative: CR Hip Uni Comp Min 2 Vws RT INDICATION: Fell just before arrival in the ER tonight. Pain in the right hip to the mid right femur. COMPARISON: None. FINDINGS: AP and frog-leg lateral view(s) of the right hip.  No fracture or dislocation. There are degenerative changes of the right hip. No bone erosion or destruction.       Impression: Degenerative changes of the right hip. No fracture. Signer Name: Marco A Sánchez MD  Signed: 10/8/2019 12:08 AM  [facility]station [facility]: [email or web address]  [facility] [place]      Allergies:  is allergic to other.    Andrea  reviewed    Discharge Medications:     Discharge Medications      New Medications      Instructions Start Date   HYDROcodone-acetaminophen 7.5-325 MG per tablet  Commonly known as:  NORCO   1 tablet, Oral, Every 4 Hours PRN      methylPREDNISolone 4 MG tablet  Commonly known as:  MEDROL (DANNY)   Take as directed on package instructions.      methylPREDNISolone 4 MG tablet  Commonly known as:  MEDROL (DANNY)   Take as directed on package instructions.      methylPREDNISolone 4 MG tablet  Commonly known as:  MEDROL (DANNY)   Take as directed on package instructions.   Start Date:  10/11/2019     methylPREDNISolone 4 MG tablet  Commonly known as:  MEDROL (DANNY)   Take as directed on package instructions.   Start Date:  10/12/2019     methylPREDNISolone 4 MG tablet  Commonly known as:  MEDROL (DANNY)   Take as directed on package instructions.   Start Date:  10/13/2019     methylPREDNISolone 4 MG tablet  Commonly known as:  MEDROL (DANNY)   Take as directed on package instructions.   Start Date:  10/13/2019     methylPREDNISolone 4 MG tablet  Commonly known as:  MEDROL (DANNY)   Take as directed on package instructions.   Start Date:  10/14/2019        Continue These Medications      Instructions Start Date   citalopram 20 MG tablet  Commonly known as:  CeleXA   2 tablets, Oral, 2 Times Daily      furosemide 40 MG tablet  Commonly known as:  LASIX   1 tablet, Oral, Daily, PATIENT STATES SHE TAKES IT EVERY OTHER DAY      glyBURIDE-metFORMIN 5-500 MG per tablet  Commonly known as:  GLUCOVANCE   2 tablets, Oral, 2 Times Daily      Insulin Glargine 100 UNIT/ML injection pen  Commonly known as:  LANTUS SOLOSTAR   58 Units, Subcutaneous, 2 Times Daily      methIMAzole 5 MG tablet  Commonly known  as:  TAPAZOLE   TAKE ONE TABLET BY MOUTH DAILY      metoprolol tartrate 50 MG tablet  Commonly known as:  LOPRESSOR   No dose, route, or frequency recorded.      pantoprazole 40 MG EC tablet  Commonly known as:  PROTONIX   1 tablet, Oral, Daily      rivaroxaban 20 MG tablet  Commonly known as:  XARELTO   1 tablet, Oral, Daily         Stop These Medications    naproxen 500 MG tablet  Commonly known as:  NAPROSYN            Last Lab Results:   Lab Results (most recent)     Procedure Component Value Units Date/Time    POC Glucose Once [[id number]]  (Abnormal) Collected:  10/10/19 0725    Specimen:  Blood Updated:  10/10/19 0742     Glucose 254 mg/dL     Comprehensive Metabolic Panel [[id number]]  (Abnormal) Collected:  10/10/19 0510    Specimen:  Blood Updated:  10/10/19 0603     Glucose 285 mg/dL      BUN 24 mg/dL      Creatinine 0.95 mg/dL      Sodium 137 mmol/L      Potassium 5.0 mmol/L      Chloride 102 mmol/L      CO2 25.0 mmol/L      Calcium 8.7 mg/dL      Total Protein 6.9 g/dL      Albumin 3.50 g/dL      ALT (SGPT) 20 U/L      AST (SGOT) 14 U/L      Alkaline Phosphatase 72 U/L      Total Bilirubin 0.3 mg/dL      eGFR Non African Amer 58 mL/min/1.73      Globulin 3.4 gm/dL      A/G Ratio 1.0 g/dL      BUN/Creatinine Ratio 25.3     Anion Gap 10.0 mmol/L     Narrative:       GFR Normal >60  Chronic Kidney Disease <60  Kidney Failure <15    POC Glucose Once [[id number]]  (Abnormal) Collected:  10/09/19 2151    Specimen:  Blood Updated:  10/09/19 2157     Glucose 248 mg/dL     Basic Metabolic Panel [[id number]]  (Normal) Collected:  10/09/19 0438    Specimen:  Blood Updated:  10/09/19 0645     Glucose 86 mg/dL      BUN 21 mg/dL      Creatinine 0.90 mg/dL      Sodium 141 mmol/L      Potassium 4.6 mmol/L      Chloride 105 mmol/L      CO2 22.7 mmol/L      Calcium 9.0 mg/dL      eGFR Non African Amer 62 mL/min/1.73      BUN/Creatinine Ratio 23.3     Anion Gap 13.3 mmol/L     Narrative:       GFR Normal >60  Chronic Kidney  Disease <60  Kidney Failure <15    Basic Metabolic Panel [[id number]]  (Abnormal) Collected:  10/08/19 0442    Specimen:  Blood Updated:  10/08/19 0529     Glucose 175 mg/dL      BUN 22 mg/dL      Creatinine 0.92 mg/dL      Sodium 139 mmol/L      Potassium 4.6 mmol/L      Chloride 103 mmol/L      CO2 24.7 mmol/L      Calcium 9.6 mg/dL      eGFR Non African Amer 61 mL/min/1.73      BUN/Creatinine Ratio 23.9     Anion Gap 11.3 mmol/L     Narrative:       GFR Normal >60  Chronic Kidney Disease <60  Kidney Failure <15    CBC Auto Differential [[id number]]  (Abnormal) Collected:  10/08/19 0442    Specimen:  Blood Updated:  10/08/19 0511     WBC 7.80 10*3/mm3      RBC 3.23 10*6/mm3      Hemoglobin 10.3 g/dL      Hematocrit 31.3 %      MCV 96.9 fL      MCH 31.9 pg      MCHC 32.9 g/dL      RDW 13.2 %      RDW-SD 47.2 fl      MPV 11.1 fL      Platelets 215 10*3/mm3      Neutrophil % 61.6 %      Lymphocyte % 26.8 %      Monocyte % 7.4 %      Eosinophil % 3.2 %      Basophil % 0.5 %      Immature Grans % 0.5 %      Neutrophils, Absolute 4.80 10*3/mm3      Lymphocytes, Absolute 2.09 10*3/mm3      Monocytes, Absolute 0.58 10*3/mm3      Eosinophils, Absolute 0.25 10*3/mm3      Basophils, Absolute 0.04 10*3/mm3      Immature Grans, Absolute 0.04 10*3/mm3      nRBC 0.0 /100 WBC     TSH [[id number]]  (Abnormal) Collected:  10/08/19 0002    Specimen:  Blood Updated:  10/08/19 0337     TSH 4.620 uIU/mL     Hemoglobin A1c [[id number]]  (Abnormal) Collected:  10/08/19 0002    Specimen:  Blood Updated:  10/08/19 0329     Hemoglobin A1C 7.10 %     Narrative:       Hemoglobin A1C Ranges:    Increased Risk for Diabetes  5.7% to 6.4%  Diabetes                     >= 6.5%  Diabetic Goal                < 7.0%    Comprehensive Metabolic Panel [[id number]]  (Abnormal) Collected:  10/08/19 0002    Specimen:  Blood Updated:  10/08/19 0024     Glucose 170 mg/dL      BUN 25 mg/dL      Creatinine 1.04 mg/dL      Sodium 138 mmol/L      Potassium 4.5 mmol/L       Chloride 102 mmol/L      CO2 24.7 mmol/L      Calcium 9.7 mg/dL      Total Protein 6.9 g/dL      Albumin 3.70 g/dL      ALT (SGPT) 23 U/L      AST (SGOT) 24 U/L      Alkaline Phosphatase 83 U/L      Total Bilirubin 0.2 mg/dL      eGFR Non African Amer 53 mL/min/1.73      Globulin 3.2 gm/dL      A/G Ratio 1.2 g/dL      BUN/Creatinine Ratio 24.0     Anion Gap 11.3 mmol/L     Narrative:       GFR Normal >60  Chronic Kidney Disease <60  Kidney Failure <15    CBC & Differential [[id number]] Collected:  10/08/19 0002    Specimen:  Blood Updated:  10/08/19 0007    Narrative:       The following orders were created for panel order CBC & Differential.  Procedure                               Abnormality         Status                     ---------                               -----------         ------                     CBC Auto Differential[[id number]]        Abnormal            Final result                 Please view results for these tests on the individual orders.    CBC Auto Differential [[id number]]  (Abnormal) Collected:  10/08/19 0002    Specimen:  Blood Updated:  10/08/19 0007     WBC 7.24 10*3/mm3      RBC 3.23 10*6/mm3      Hemoglobin 10.4 g/dL      Hematocrit 31.1 %      MCV 96.3 fL      MCH 32.2 pg      MCHC 33.4 g/dL      RDW 13.1 %      RDW-SD 46.4 fl      MPV 11.3 fL      Platelets 217 10*3/mm3      Neutrophil % 62.1 %      Lymphocyte % 26.1 %      Monocyte % 6.9 %      Eosinophil % 3.9 %      Basophil % 0.4 %      Immature Grans % 0.6 %      Neutrophils, Absolute 4.50 10*3/mm3      Lymphocytes, Absolute 1.89 10*3/mm3      Monocytes, Absolute 0.50 10*3/mm3      Eosinophils, Absolute 0.28 10*3/mm3      Basophils, Absolute 0.03 10*3/mm3      Immature Grans, Absolute 0.04 10*3/mm3      nRBC 0.0 /100 WBC         Imaging Results (most recent)     Procedure Component Value Units Date/Time    XR Femur 2 View Right [[id number]] Collected:  10/08/19 0109     Updated:  10/08/19 0129    Narrative:       CR Femur 2  Vws RT    INDICATION:   69-year-old female who fell tonight. Pain from the right hip through the mid femur.    COMPARISON:   None available.    FINDINGS:   2 views of the right femur.  No fracture or dislocation.  No bone erosion or destruction. Articulation at the hip and knee is anatomic.  No foreign body.      Impression:         1. Degenerative change of the right hip and right knee, otherwise negative.    Signer Name: Marco A Sánchez MD   Signed: 10/8/2019 1:09 AM   Workstation Name: [facility]    [facility]    XR Hip With or Without Pelvis 2 - 3 View Right [[id number]] Collected:  10/08/19 0008     Updated:  10/08/19 0049    Narrative:       CR Hip Uni Comp Min 2 Vws RT    INDICATION:   Fell just before arrival in the ER tonight. Pain in the right hip to the mid right femur.    COMPARISON:   None.    FINDINGS:  AP and frog-leg lateral view(s) of the right hip.  No fracture or dislocation. There are degenerative changes of the right hip. No bone erosion or destruction.        Impression:       Degenerative changes of the right hip. No fracture.    Signer Name: Marco A Sánchez MD   Signed: 10/8/2019 12:08 AM   Workstation Name: [facility]    [facility]    CT Head Without Contrast [[id number]] Collected:  10/08/19 0012     Updated:  10/08/19 0049    Narrative:       CT Head WO    HISTORY:   69-year-old female who tripped and fell tonight. Does not believe she had her head but is on blood thinners.    TECHNIQUE:   Axial unenhanced head CT. Radiation dose reduction techniques included automated exposure control or exposure modulation based on body size. Count of known CT and cardiac nuc med studies performed in previous 12 months: 0.     Time of scan: 2359 hours    COMPARISON:   None.    FINDINGS:   No intracranial hemorrhage, mass, or infarct. No hydrocephalus or extra-axial fluid collection. There are senescent changes, including volume loss and nonspecific white  matter change, but no acute abnormality is seen. The skull base, calvarium, and  extracranial soft tissues are normal. Incidental chronic-appearing right maxillary sinus disease.      Impression:       Normal, negative unenhanced head CT.          Signer Name: Marco A Sánchez MD   Signed: 10/8/2019 12:12 AM   Workstation Name: [email or web address]    [facility] [place]          PROCEDURES      Condition on Discharge:  Stable    Physical Exam at Discharge  Vital Signs  Temp:  [97.2 °F (36.2 °C)-98 °F (36.7 °C)] 97.4 °F (36.3 °C)  Heart Rate:  [60-75] 60  Resp:  [16-20] 18  BP: (113-130)/(60-81) 129/64    Physical Exam:  Physical Exam   Constitutional: Patient appears well-developed and well-nourished and in no acute distress   Cardiovascular: Regular rate, regular rhythm, S1 normal and S2 normal.  Exam reveals no gallop and no friction rub.  No murmur heard.  Pulmonary/Chest: Lungs are clear to auscultation bilaterally. No respiratory distress. No wheezes. No rhonchi. No rales.   Abdominal: Obese. Soft. Bowel sounds are normal. There is no tenderness.   Musculoskeletal: Normal Muscle tone  Neurological: Cranial nerves II-XII are grossly intact with no focal deficits.    Discharge Disposition  Home    Visiting Nurse:    No     Home PT/OT:  Yes     Home Safety Evaluation:  Yes     DME  Knee brace    Discharge Diet:      Dietary Orders (From admission, onward)    Start     Ordered    10/08/19 1419  Diet Regular; Cardiac, Consistent Carbohydrate  Diet Effective Now     Question Answer Comment   Diet Texture / Consistency Regular    Common Modifiers Cardiac    Common Modifiers Consistent Carbohydrate        10/08/19 1418          Activity at Discharge:  As tolerated      Follow-up Appointments  No future appointments.  Additional Instructions for the Follow-ups that You Need to Schedule     Discharge Follow-up with PCP   As directed       Currently Documented PCP:    Edith Chávez PA    PCP Phone Number:     [phone]     Follow Up Details:  1 week         Discharge Follow-up with Specified Provider: Dr. Reid; 4 Days   As directed      To:  Dr. Reid    Follow Up:  4 Days    Follow Up Details:  Start Meloxicam               Test Results Pending at Discharge       Mando Rosado MD  10/10/19  11:55 AM

## 2019-10-10 NOTE — TELEPHONE ENCOUNTER
"    Reason for Disposition  • [1] Follow-up call from patient regarding patient's clinical status AND [2] information NON-URGENT    Additional Information  • Negative: Lab calling with strep throat test results and triager can call in prescription  • Negative: Lab calling with urinalysis test results and triager can call in prescription  • Negative: Medication questions  • Negative: ED call to PCP  • Negative: Physician call to PCP  • Negative: Call about patient who is currently hospitalized  • Negative: Lab or radiology calling with CRITICAL test results  • Negative: [1] Prescription not at pharmacy AND [2] was prescribed today by PCP  • Negative: [1] Follow-up call from patient regarding patient's clinical status AND [2] information urgent  • Negative: [1] Caller requests to speak ONLY to PCP AND [2] URGENT question  • Negative: [1] Caller requests to speak to PCP now AND [2] won't tell us reason for call  (Exception: if 10 pm to 6 am, caller must first discuss reason for the call)  • Negative: Notification of hospital admission  • Negative: Notification of death  • Negative: Caller requesting lab results  • Negative: Lab or radiology calling with test results    Answer Assessment - Initial Assessment Questions  1. REASON FOR CALL or QUESTION: \"What is your reason for calling today?\" or \"How can I best  help you?\" or \"What question do you have that I can help answer?\"      When can return to work, needs a note.   2. CALLER: Document the source of call. (e.g., laboratory, patient).      Spouse., Jack Gavin.    Protocols used: PCP CALL - NO TRIAGE-ADULT-      "

## 2019-10-10 NOTE — THERAPY TREATMENT NOTE
Acute Care - Physical Therapy Treatment Note   Altagracia Uribe     Patient Name: Lisa Gavin  : 1950  MRN: [id number]  Today's Date: 10/10/2019  Onset of Illness/Injury or Date of Surgery: 10/07/19  Date of Referral to PT: 10/09/19  Referring Physician: Dr. Reid    Admit Date: 10/7/2019    Visit Dx:    ICD-10-CM ICD-9-CM   1. Fall, initial encounter W19.XXXA E888.9   2. Right hamstring strain, initial encounter S76.311A 843.8     Patient Active Problem List   Diagnosis   • Hyperthyroidism   • Thyroid nodule   • Fatigue   • Abnormal weight gain   • Uncontrolled type 2 diabetes mellitus (CMS/HCC)   • Inadequate pain control   • Acid reflux   • Paroxysmal atrial fibrillation (CMS/HCC)   • Fall at home   • Posterior pain of right hip       Therapy Treatment    Rehabilitation Treatment Summary     Row Name 10/10/19 1031             Treatment Time/Intention    Discipline  physical therapist  -JW      Document Type  therapy note (daily note)  -JW      Subjective Information  complains of;pain  -JW      Mode of Treatment  physical therapy  -JW      Patient/Family Observations  pt supine  -JW      Care Plan Review  care plan/treatment goals reviewed  -JW      Care Plan Review, Other Participant(s)  spouse  -JW      Therapy Frequency (PT Clinical Impression)  daily  -JW      Patient Effort  good  -JW      Existing Precautions/Restrictions  fall  -JW      Recorded by [JW] Rubia Blackwood, PT 10/10/19 1120      Row Name 10/10/19 1031             Cognitive Assessment/Intervention- PT/OT    Orientation Status (Cognition)  oriented x 4  -JW      Follows Commands (Cognition)  WFL  -JW      Personal Safety Interventions  gait belt;nonskid shoes/slippers when out of bed  -JW      Recorded by [JW] Rubia Blackwood, PT 10/10/19 1120      Row Name 10/10/19 1031             Mobility Assessment/Intervention    Extremity Weight-bearing Status  right lower extremity  -JW      Right Lower Extremity (Weight-bearing Status)  weight-bearing  as tolerated (WBAT)  -JW      Recorded by [JW] Rubia Blackwood, PT 10/10/19 1120      Row Name 10/10/19 1031             Bed Mobility Assessment/Treatment    Supine-Sit [place] (Bed Mobility)  minimum assist (75% patient effort)  -JW      Sit-Supine [place] (Bed Mobility)  supervision  -JW      Bed Mobility, Safety Issues  decreased use of legs for bridging/pushing  -JW      Assistive Device (Bed Mobility)  bed rails;head of bed elevated  -JW      Comment (Bed Mobility)  pt did bett with log roll to the left  -JW      Recorded by [JW] Rubia Blackwood, PT 10/10/19 1120      Row Name 10/10/19 1031             Sit-Stand Transfer    Sit-Stand [place] (Transfers)  moderate assist (50% patient effort)  -JW      Assistive Device (Sit-Stand Transfers)  walker, front-wheeled  -JW      Recorded by [JW] Rubia Blackwood, PT 10/10/19 1120      Row Name 10/10/19 1031             Stand-Sit Transfer    Stand-Sit [place] (Transfers)  contact guard  -      Assistive Device (Stand-Sit Transfers)  walker, front-wheeled  -JW      Recorded by [JW] Rubia Blackwood, PT 10/10/19 1120      Row Name 10/10/19 1031             Gait/Stairs Assessment/Training    [place] Level (Gait)  contact guard;verbal cues  -      Assistive Device (Gait)  walker, front-wheeled  -JW      Distance in Feet (Gait)  15  -JW      Pattern (Gait)  step-to  -JW      Bilateral Gait Deviations  forward flexed posture  -JW      Comment (Gait/Stairs)  pt had increased difficulty bearing weight on her RLE due to pain. She had difficulty advancing the RLE and did better sliding it forward due to hamstring strain. Pt able to transfer on and off the comode with min A.   -JW      Recorded by [JW] Rubia Blackwood, PT 10/10/19 1120      Row Name 10/10/19 1031             Positioning and Restraints    Pre-Treatment Position  in bed  -JW      Post Treatment Position  bed  -JW      In Bed  supine;call light within reach;encouraged to call for assist;with family/caregiver   -MILA      Recorded by [MILA] Rubia Blackwood, PT 10/10/19 1120      Row Name 10/10/19 1031             Outcome Summary/Treatment Plan (PT)    Daily Summary of Progress (PT)  progress towards functional goals is fair  -MILA      Barriers to Overall Progress (PT)  pain  -JW      Plan for Continued Treatment (PT)  working on mobility and decreased pain  -MILA      Anticipated Equipment Needs at Discharge (PT)  wheelchair  -MILA      Patient/Family Concerns, Equipment Needs at Discharge (PT)  pt family report they have a BSC and walker. SHe would benefit from a wc for community distances  -MILA      Anticipated Discharge Disposition (PT)  home with home health  -      Recorded by [JW] Rubia Blackwood, PT 10/10/19 1120        User Key  (r) = Recorded By, (t) = Taken By, (c) = Cosigned By    Initials Name Effective Dates Discipline    JW Rubia Blackwood, PT 06/20/18 -  PT                   Physical Therapy Education     Title: PT OT SLP Therapies (In Progress)     Topic: Physical Therapy (In Progress)     Point: Mobility training (Done)     Learning Progress Summary           Patient Acceptance, E,TB, VU by MILA at 10/10/2019 11:21 AM    Acceptance, E,TB, VU by  at 10/9/2019 11:40 AM   Family Acceptance, E,TB, VU by MILA at 10/10/2019 11:21 AM   Significant Other Acceptance, E,TB, VU by  at 10/9/2019 11:40 AM                               User Key     Initials Effective Dates Name Provider Type Discipline     04/03/18 -  Crow Allen, PT Physical Therapist PT     06/20/18 -  Rubia Blackwood, PT Physical Therapist PT                PT Recommendation and Plan  Anticipated Discharge Disposition (PT): home with home health  Therapy Frequency (PT Clinical Impression): daily  Outcome Summary/Treatment Plan (PT)  Daily Summary of Progress (PT): progress towards functional goals is fair  Barriers to Overall Progress (PT): pain  Plan for Continued Treatment (PT): working on mobility and decreased pain  Anticipated Equipment Needs at Discharge  (PT): wheelchair  Patient/Family Concerns, Equipment Needs at Discharge (PT): pt family report they have a BSC and walker. SHe would benefit from a wc for community distances  Anticipated Discharge Disposition (PT): home with home health  Plan of Care Reviewed With: patient  Progress: improving  Outcome Summary: PT: pt able to ambulate with rwx into the bathroom today and transfer on/off commode. Pt has increased pain with walking and tarnsfers when advancing her RLE. Pt only able to walk short distance, 15ft and required increased time and effort to ambulate minimal distance. Pt reports she has a BSC and rwx at home and she would benefit from a wc for community distance.   Outcome Measures     Row Name 10/10/19 1046 10/09/19 1040          How much help from another person do you currently need...    Turning from your back to your side while in flat bed without using bedrails?  3  -JW  3  -BP     Moving from lying on back to sitting on the side of a flat bed without bedrails?  2  -  3  -BP     Moving to and from a bed to a chair (including a wheelchair)?  3  -  3  -BP     Standing up from a chair using your arms (e.g., wheelchair, bedside chair)?  3  -  2  -BP     Climbing 3-5 steps with a railing?  2  -  2  -BP     To walk in hospital room?  3  -JW  3  -BP     AM-PAC 6 Clicks Score (PT)  16  -  16  -BP        Functional Assessment    Outcome Measure Options  AM-PAC 6 Clicks Basic Mobility (PT)  -JW  AM-PAC 6 Clicks Basic Mobility (PT)  -BP       User Key  (r) = Recorded By, (t) = Taken By, (c) = Cosigned By    Initials Name Provider Type    BP Crow Allen, PT Physical Therapist    Rubia Dennis, PT Physical Therapist         Time Calculation:   PT Charges     Row Name 10/10/19 1123             Time Calculation    Start Time  1031  -      Stop Time  1050  -      Time Calculation (min)  19 min  -         Timed Charges    00653 - Gait Training Minutes   19  -        User Key  (r) = Recorded  By, (t) = Taken By, (c) = Cosigned By    Initials Name Provider Type    JW Rubia Blackwood, PT Physical Therapist        Therapy Charges for Today     Code Description Service Date Service Provider Modifiers Qty    24802699999 HC GAIT TRAINING EA 15 MIN 10/10/2019 Rubia Blackwood, PT GP 1          PT G-Codes  Outcome Measure Options: AM-PAC 6 Clicks Basic Mobility (PT)  AM-PAC 6 Clicks Score (PT): 16    Rubia Meraz, PT  10/10/2019

## 2019-10-10 NOTE — PLAN OF CARE
Problem: Patient Care Overview  Goal: Plan of Care Review  Outcome: Outcome(s) achieved Date Met: 10/10/19   10/10/19 8856   Coping/Psychosocial   Plan of Care Reviewed With patient   Plan of Care Review   Progress improving   OTHER   Outcome Summary patient has pain controlled with oral pain medication and can abmbulate short distances with a walker. discharging patient to manage recovery at home/ outpatient.      Goal: Individualization and Mutuality  Outcome: Outcome(s) achieved Date Met: 10/10/19    Goal: Discharge Needs Assessment  Outcome: Outcome(s) achieved Date Met: 10/10/19    Goal: Interprofessional Rounds/Family Conf  Outcome: Outcome(s) achieved Date Met: 10/10/19      Problem: Fall Risk (Adult)  Goal: Identify Related Risk Factors and Signs and Symptoms  Outcome: Outcome(s) achieved Date Met: 10/10/19    Goal: Absence of Fall  Outcome: Outcome(s) achieved Date Met: 10/10/19      Problem: Skin Injury Risk (Adult)  Goal: Identify Related Risk Factors and Signs and Symptoms  Outcome: Outcome(s) achieved Date Met: 10/10/19    Goal: Skin Health and Integrity  Outcome: Outcome(s) achieved Date Met: 10/10/19

## 2019-10-10 NOTE — PLAN OF CARE
Problem: Patient Care Overview  Goal: Plan of Care Review   10/10/19 1121   Coping/Psychosocial   Plan of Care Reviewed With patient   Plan of Care Review   Progress improving   OTHER   Outcome Summary PT: pt able to ambulate with rwx into the bathroom today and transfer on/off commode. Pt has increased pain with walking and tarnsfers when advancing her RLE. Pt only able to walk short distance, 15ft and required increased time and effort to ambulate minimal distance. Pt reports she has a BSC and rwx at home and she would benefit from a wc for community distance.

## 2019-10-10 NOTE — PLAN OF CARE
Problem: Patient Care Overview  Goal: Plan of Care Review  Outcome: Ongoing (interventions implemented as appropriate)   10/10/19 0347   Coping/Psychosocial   Plan of Care Reviewed With patient   Plan of Care Review   Progress no change       Problem: Fall Risk (Adult)  Goal: Absence of Fall  Outcome: Ongoing (interventions implemented as appropriate)      Problem: Skin Injury Risk (Adult)  Goal: Skin Health and Integrity  Outcome: Ongoing (interventions implemented as appropriate)   10/10/19 5927   Skin Injury Risk (Adult)   Skin Health and Integrity making progress toward outcome       Problem: NPPV/CPAP (Adult)  Goal: Signs and Symptoms of Listed Potential Problems Will be Absent, Minimized or Managed (NPPV/CPAP)  Outcome: Ongoing (interventions implemented as appropriate)

## 2019-10-11 ENCOUNTER — READMISSION MANAGEMENT (OUTPATIENT)
Dept: CALL CENTER | Facility: HOSPITAL | Age: 69
End: 2019-10-11

## 2019-10-11 NOTE — OUTREACH NOTE
Prep Survey      Responses   Facility patient discharged from?  [facility]   Is LACE score < 7 ?  Yes   Is patient eligible?  Yes   Discharge diagnosis  Right hip pain s/p fall, DM, Hyperthyroidism, left knee pain, PAF, right hamstring strain   Does the patient have one of the following disease processes/diagnoses(primary or secondary)?  Other [LACE <7]   Does the patient have Home health ordered?  Yes   What is the Home health agency?   CM notes pt has order for HH and would notify CM of preferred agency   Is there a DME ordered?  No   Comments regarding appointments  Pt to schedule follow up with PCP   Prep survey completed?  Yes          Jeanine Romeo RN

## 2019-10-13 NOTE — NURSING NOTE
Case Management Discharge Note    Final Note: Discharged home with HH    Destination      No service has been selected for the patient.      Durable Medical Equipment      No service has been selected for the patient.      Dialysis/Infusion      No service has been selected for the patient.      Home Medical Care      No service has been selected for the patient.      Therapy      No service has been selected for the patient.      Community Resources      No service has been selected for the patient.             Final Discharge Disposition Code: 06 - home with home health care

## 2019-10-14 ENCOUNTER — READMISSION MANAGEMENT (OUTPATIENT)
Dept: CALL CENTER | Facility: HOSPITAL | Age: 69
End: 2019-10-14

## 2019-10-14 NOTE — OUTREACH NOTE
LAG < 7 Survey      Responses   Facility patient discharged from?  [facility]   Does the patient have one of the following disease processes/diagnoses(primary or secondary)?  Other   Is there a successful TCM telephone encounter documented?  No   BHLAG <7 Attempt successful?  No   Unsuccessful attempts  Attempt 1          Deidra Shetty RN

## 2019-10-15 ENCOUNTER — OFFICE VISIT (OUTPATIENT)
Dept: ORTHOPEDIC SURGERY | Facility: CLINIC | Age: 69
End: 2019-10-15

## 2019-10-15 ENCOUNTER — READMISSION MANAGEMENT (OUTPATIENT)
Dept: CALL CENTER | Facility: HOSPITAL | Age: 69
End: 2019-10-15

## 2019-10-15 VITALS
HEART RATE: 79 BPM | WEIGHT: 217 LBS | DIASTOLIC BLOOD PRESSURE: 77 MMHG | HEIGHT: 63 IN | SYSTOLIC BLOOD PRESSURE: 129 MMHG | BODY MASS INDEX: 38.45 KG/M2

## 2019-10-15 DIAGNOSIS — S76.311D HAMSTRING STRAIN, RIGHT, SUBSEQUENT ENCOUNTER: Primary | ICD-10-CM

## 2019-10-15 PROCEDURE — 99212 OFFICE O/P EST SF 10 MIN: CPT | Performed by: ORTHOPAEDIC SURGERY

## 2019-10-15 NOTE — OUTREACH NOTE
LAG < 7 Survey      Responses   Facility patient discharged from?  [facility]   Does the patient have one of the following disease processes/diagnoses(primary or secondary)?  Other   Is there a successful TCM telephone encounter documented?  No   BHLAG <7 Attempt successful?  No   Unsuccessful attempts  Attempt 2          Deidra Shetty RN

## 2019-10-15 NOTE — PROGRESS NOTES
Subjective:     Patient ID: Lisa Gavin is a 69 y.o. female.    Chief Complaint:  Follow-up right thigh pain, hamstring strain  History of Present Illness  Lisa Gavin returns to clinic today for evaluation of her right lower extremity.  Today, she rates the pain as 5/10, describes it as aching in nature.    Localizes pain to the posterior medial thigh and mid portion of hamstring.    Symptoms are exacerbated with sitting and walking.   Denies radiation of pain, denies associated numbness or tingling.     Social History     Occupational History   • Not on file   Tobacco Use   • Smoking status: Never Smoker   • Smokeless tobacco: Never Used   Substance and Sexual Activity   • Alcohol use: No   • Drug use: No   • Sexual activity: Defer      Past Medical History:   Diagnosis Date   • Arthritis    • Depression    • GERD (gastroesophageal reflux disease)    • Headache    • Sleep apnea    • Type 2 diabetes mellitus (CMS/HCC)      Past Surgical History:   Procedure Laterality Date   • CHOLECYSTECTOMY     • HYSTERECTOMY     • ROTATOR CUFF REPAIR      ACUTE   • SHOULDER SURGERY Bilateral     hAS HAD ROTATOR CUFF SURGERY ON BOTH SHOULDERS       Family History   Problem Relation Age of Onset   • Cancer Other    • Diabetes Other    • Glaucoma Other    • Rheum arthritis Other    • Kidney disease Other          Review of Systems   Constitutional: Negative for chills, diaphoresis, fever and unexpected weight change.   HENT: Negative for hearing loss, nosebleeds, sore throat and tinnitus.    Eyes: Negative for pain and visual disturbance.   Respiratory: Negative for cough, shortness of breath and wheezing.    Cardiovascular: Negative for chest pain and palpitations.   Gastrointestinal: Negative for abdominal pain, diarrhea, nausea and vomiting.   Endocrine: Negative for cold intolerance, heat intolerance and polydipsia.   Genitourinary: Negative for difficulty urinating, dysuria and hematuria.   Musculoskeletal: Positive  "for arthralgias and myalgias. Negative for joint swelling.   Skin: Negative for rash and wound.   Allergic/Immunologic: Negative for environmental allergies.   Neurological: Negative for dizziness, syncope and numbness.   Hematological: Does not bruise/bleed easily.   Psychiatric/Behavioral: Negative for dysphoric mood and sleep disturbance. The patient is not nervous/anxious.            Objective:  Vitals:    10/15/19 1538   BP: 129/77   BP Location: Left arm   Patient Position: Sitting   Cuff Size: Large Adult   Pulse: 79   Weight: 98.4 kg (217 lb)   Height: 160 cm (63\")         10/15/19  1538   Weight: 98.4 kg (217 lb)     Body mass index is 38.44 kg/m².    Physical Exam    Vital signs reviewed.   General: No acute distress, alert and oriented  Eyes: conjunctiva clear; pupils equally round and reactive  ENT: external ears and nose atraumatic; oropharynx clear  CV: no peripheral edema  Resp: normal respiratory effort  Skin: no rashes or wounds; normal turgor  Psych: mood and affect appropriate; recent and remote memory intact      Ortho Exam       Right Lower Extremity-  Maximal tenderness posterior medial thigh and mid portion of hamstring with ecchymosis 10x10 cm  No tenderness or effusion into the right knee.   Right knee 0-130 degrees  Maximal pain with maximal knee extension  Increased pain on hip flexion and knee extension    Imaging:  Xr Femur 2 View Right    Result Date: 10/8/2019  Impression: 1. Degenerative change of the right hip and right knee, otherwise negative. Signer Name: Marco A Sánchze MD  Signed: 10/8/2019 1:09 AM  Workstation Name: [facility]  Radiology [facility]    Xr Knee 3 View Left    Result Date: 9/22/2019  Impression: Degenerative changes of the left knee. No fracture. Clinical aspects of the case will determine if MR of the left knee could provide additional information. Signer Name: Stiven Robins MD  Signed: 9/22/2019 3:38 PM  Workstation Name: [email or web address]  Radiology " [facility]    Ct Head Without Contrast    Result Date: 10/8/2019  Impression: Normal, negative unenhanced head CT. Signer Name: Marco A Sánchez MD  Signed: 10/8/2019 12:12 AM  Workstation Name: [facility]    Xr Spine Lumbar Ap & Lateral    Result Date: 9/22/2019  Impression: 1.  No clearly acute findings demonstrated by conventional radiograph. 2.  Lumbar spondylosis as described. 3.  Clinical aspects of the case will determine if MR of the lumbar spine could provide additional information. Signer Name: Stiven Robins MD  Signed: 9/22/2019 3:36 PM  Workstation [facility]: [email or web address]    Xr Hip With Or Without Pelvis 2 - 3 View Right    Result Date: 10/8/2019  Impression: Degenerative changes of the right hip. No fracture. Signer Name: Marco A Sánchez MD  Signed: 10/8/2019 12:08 AM  Workstation Name: [facility]    Review again of prior imaging indicates moderate arthritic change to both left knee and left hip  Assessment:        1. Hamstring strain, right, subsequent encounter           Plan:          1. Discussed treatment options at length with patient at today's visit.   2. Patient will begin working with home health physical therapy tomorrow.  3. Patient may discontinue use of knee immobilizer at this time if she ambulates using a walker for support.   4. Call our office if her pain does not improve over the next 3-5 days and we may consider another course of oral steroids.   5. Patient may not return to work until re-evaluation at our follow-up in 3-4 weeks.       Lisa Gavin and her family members were was in agreement with plan and had all questions answered.     Orders:  No orders of the defined types were placed in this encounter.      Medications:  No orders of the defined types were placed in this encounter.      Followup:  Return in about 3 weeks (around 11/5/2019).    Lisa was  seen today for pain and edema.    Diagnoses and all orders for this visit:    Hamstring strain, right, subsequent encounter      By signing my name here, I Miladys Quezada, attest that all documentation on 10/15/19 at 4:06 PM has been prepared under the direction and in the presence of Dr. Sheldon Reid.    I, Dr. Sheldon Reid, personally performed the services described in this documentation, as scribed by Miladys Quezada, in my presence, and it is both accurate and complete.    Dictated utilizing Dragon dictation

## 2019-10-18 ENCOUNTER — READMISSION MANAGEMENT (OUTPATIENT)
Dept: CALL CENTER | Facility: HOSPITAL | Age: 69
End: 2019-10-18

## 2019-10-18 NOTE — OUTREACH NOTE
LAG < 7 Survey      Responses   Facility patient discharged from?  [facility]   Does the patient have one of the following disease processes/diagnoses(primary or secondary)?  Other   Is there a successful TCM telephone encounter documented?  No   BHLAG <7 Attempt successful?  Yes   Call start time  1039   Call end time  1046   Discharge diagnosis  Right hip pain s/p fall, DM, Hyperthyroidism, left knee pain, PAF, right hamstring strain   Person spoke with today (if not patient) and relationship  Jack-spouse   Meds reviewed with patient/caregiver?  Yes   Is the patient having any side effects they believe may be caused by any medication additions or changes?  No   Does the patient have all medications ordered at discharge?  Yes   Is the patient taking all medications as directed (includes completed medication regime)?  Yes   Does the patient have a primary care provider?   Yes   Does the patient have an appointment with their PCP within 7 days of discharge?  Yes   Comments regarding PCP  Pt followed up with Dr. Wong, PCP office, and Dr. Reid   Has the patient kept scheduled appointments due by today?  Yes   What is the Home health agency?   [facility]    Has home health visited the patient within 72 hours of discharge?  Yes   DME comments  Pt is using a walker   Psychosocial issues?  Yes   Did the patient receive a copy of their discharge instructions?  Yes   Nursing interventions  Reviewed instructions with patient   What is the patient's perception of their health status since discharge?  Improving [ reports patient is up and sitting in a chair, is walking around the house with walker, using the bathroom unassisted, using the shower chair, and becoming more independent with ADL's.]   Is the patient/caregiver able to teach back signs and symptoms related to disease process for when to call PCP?  Yes   Is the patient/caregiver able to teach back signs and symptoms related to disease process for when to  call 911?  Yes   Is the patient/caregiver able to teach back the hierarchy of who to call/visit for symptoms/problems? PCP, Specialist, Home health nurse, Urgent Care, ED, 911  Yes   If the patient is a current smoker, are they able to teach back resources for cessation?  -- [Pt is not a smoker]          Christiane Neves RN

## 2019-11-15 ENCOUNTER — OFFICE VISIT (OUTPATIENT)
Dept: ORTHOPEDIC SURGERY | Facility: CLINIC | Age: 69
End: 2019-11-15

## 2019-11-15 VITALS — WEIGHT: 217 LBS | HEIGHT: 63 IN | BODY MASS INDEX: 38.45 KG/M2

## 2019-11-15 DIAGNOSIS — S76.311D HAMSTRING STRAIN, RIGHT, SUBSEQUENT ENCOUNTER: Primary | ICD-10-CM

## 2019-11-15 PROCEDURE — 99212 OFFICE O/P EST SF 10 MIN: CPT | Performed by: ORTHOPAEDIC SURGERY

## 2019-11-15 NOTE — PROGRESS NOTES
"Subjective:     Patient ID: Lisa Gavin is a 69 y.o. female.    Chief Complaint: Follow-up right thigh pain, hamstring strain    History of Present Illness  Lisa Gavin returns to clinic today for evaluation of her right thigh. She states her pain is much better.  She rates the residual pain as 1/10, describes it as aching in nature.    Localizes pain to proximal posterior thigh.     Denies radiation of pain, denies associated numbness or tingling.    Social History     Occupational History   • Not on file   Tobacco Use   • Smoking status: Never Smoker   • Smokeless tobacco: Never Used   Substance and Sexual Activity   • Alcohol use: No   • Drug use: No   • Sexual activity: Defer      Past Medical History:   Diagnosis Date   • Arthritis    • Depression    • GERD (gastroesophageal reflux disease)    • Headache    • Sleep apnea    • Type 2 diabetes mellitus (CMS/HCC)      Past Surgical History:   Procedure Laterality Date   • CHOLECYSTECTOMY     • HYSTERECTOMY     • ROTATOR CUFF REPAIR      ACUTE   • SHOULDER SURGERY Bilateral     hAS HAD ROTATOR CUFF SURGERY ON BOTH SHOULDERS       Family History   Problem Relation Age of Onset   • Cancer Other    • Diabetes Other    • Glaucoma Other    • Rheum arthritis Other    • Kidney disease Other          Review of Systems        Objective:  Vitals:    11/15/19 1026   Weight: 98.4 kg (217 lb)   Height: 160 cm (63\")         11/15/19  1026   Weight: 98.4 kg (217 lb)     Body mass index is 38.44 kg/m².    General: No acute distress.  Resp: normal respiratory effort  Skin: no rashes or wounds; normal turgor  Psych: mood and affect appropriate; recent and remote memory intact        Ortho Exam       Right Knee-    ROM 0-125 degrees  4+/5 on flexion  4+/5 on extension  Maximal tenderness proximal posterior thigh  No tenderness over ischial tuberosity or hamstring insertions  Positive sensation light tough all distributions symmetric to contralateral side  Brisk cap refill " all digits  2+ dorsalis pedis pulse    Imaging:  None  Assessment:        1. Hamstring strain, right, subsequent encounter           Plan:          1. Discussed treatment options at length with patient at today's visit.   2. She may return to activities as tolerating, letting pain be her guide.  3. Advised patient to continue working on her at-home exercises and stretches.   4. Patient would like to set an appointment for evaluation of her left knee.       Lisa Gavin and her  in agreement with plan and had all questions answered.     Orders:  No orders of the defined types were placed in this encounter.      Medications:  No orders of the defined types were placed in this encounter.      Followup:  Return if symptoms worsen or fail to improve.    Lisa was seen today for follow-up.    Diagnoses and all orders for this visit:    Hamstring strain, right, subsequent encounter        By signing my name here, I Miladys Quezada, attest that all documentation on 11/26/19 at 12:28 PM has been prepared under the direction and in the presence of Dr. Sheldon Reid.    I, Dr. Sheldon Reid, personally performed the services described in this documentation, as scribed by Miladys Quezada, in my presence, and it is both accurate and complete.    Dictated utilizing Dragon dictation

## 2019-12-06 ENCOUNTER — OFFICE VISIT (OUTPATIENT)
Dept: ORTHOPEDIC SURGERY | Facility: CLINIC | Age: 69
End: 2019-12-06

## 2019-12-06 VITALS — WEIGHT: 217 LBS | BODY MASS INDEX: 38.45 KG/M2 | HEIGHT: 63 IN

## 2019-12-06 DIAGNOSIS — M17.12 PRIMARY OSTEOARTHRITIS OF LEFT KNEE: Primary | ICD-10-CM

## 2019-12-06 DIAGNOSIS — R52 PAIN: ICD-10-CM

## 2019-12-06 PROCEDURE — 99214 OFFICE O/P EST MOD 30 MIN: CPT | Performed by: ORTHOPAEDIC SURGERY

## 2019-12-06 PROCEDURE — 20610 DRAIN/INJ JOINT/BURSA W/O US: CPT | Performed by: ORTHOPAEDIC SURGERY

## 2019-12-06 RX ORDER — LIDOCAINE HYDROCHLORIDE 10 MG/ML
8 INJECTION, SOLUTION EPIDURAL; INFILTRATION; INTRACAUDAL; PERINEURAL
Status: COMPLETED | OUTPATIENT
Start: 2019-12-06 | End: 2019-12-06

## 2019-12-06 RX ORDER — TRIAMCINOLONE ACETONIDE 40 MG/ML
80 INJECTION, SUSPENSION INTRA-ARTICULAR; INTRAMUSCULAR
Status: COMPLETED | OUTPATIENT
Start: 2019-12-06 | End: 2019-12-06

## 2019-12-06 RX ADMIN — LIDOCAINE HYDROCHLORIDE 8 ML: 10 INJECTION, SOLUTION EPIDURAL; INFILTRATION; INTRACAUDAL; PERINEURAL at 10:30

## 2019-12-06 RX ADMIN — TRIAMCINOLONE ACETONIDE 80 MG: 40 INJECTION, SUSPENSION INTRA-ARTICULAR; INTRAMUSCULAR at 10:30

## 2019-12-06 NOTE — PROGRESS NOTES
Procedure   Large Joint Arthrocentesis: L knee  Date/Time: 12/6/2019 10:30 AM  Consent given by: patient  Site marked: site marked  Timeout: Immediately prior to procedure a time out was called to verify the correct patient, procedure, equipment, support staff and site/side marked as required   Supporting Documentation  Indications: pain   Procedure Details  Location: knee - L knee  Preparation: Patient was prepped and draped in the usual sterile fashion  Needle size: 22 G  Approach: superior  Medications administered: 8 mL lidocaine PF 1% 1 %; 80 mg triamcinolone acetonide 40 MG/ML  Patient tolerance: patient tolerated the procedure well with no immediate complications

## 2019-12-06 NOTE — PROGRESS NOTES
Subjective:     Patient ID: Lisa Gavin is a 69 y.o. female.    Chief Complaint:  Left knee pain, new issue  Follow up right hamstring strain  History of Present Illness  Lisa Gavin returns to clinic today for evaluation of her left knee and right thigh.  She continues to have residual pain in the posterior aspect of her right thigh which worsened after she fell out of the bathtub last week. She rates the pain as a 4-5/10 while she sits. This pain is improved with rest.    She rates the pain in her left knee as 6-7/10, describes it as aching in nature.    Localizes pain to the anterior aspect.    Has noted improvement with rest and previous cortisone injections lasting 3 months.    Symptoms are exacerbated with walking. She denies significant swelling. She denies hip or groin pain  Denies radiation of pain, denies associated numbness or tingling.     Social History     Occupational History   • Not on file   Tobacco Use   • Smoking status: Never Smoker   • Smokeless tobacco: Never Used   Substance and Sexual Activity   • Alcohol use: No   • Drug use: No   • Sexual activity: Defer      Past Medical History:   Diagnosis Date   • Arthritis    • Depression    • GERD (gastroesophageal reflux disease)    • Headache    • Sleep apnea    • Type 2 diabetes mellitus (CMS/[facility])      Past Surgical History:   Procedure Laterality Date   • CHOLECYSTECTOMY     • HYSTERECTOMY     • ROTATOR CUFF REPAIR      ACUTE   • SHOULDER SURGERY Bilateral     hAS HAD ROTATOR CUFF SURGERY ON BOTH SHOULDERS       Family History   Problem Relation Age of Onset   • Cancer Other    • Diabetes Other    • Glaucoma Other    • Rheum arthritis Other    • Kidney disease Other          Review of Systems   Constitutional: Negative for chills, diaphoresis, fever and unexpected weight change.   HENT: Negative for hearing loss, nosebleeds, sore throat and tinnitus.    Eyes: Negative for pain and visual disturbance.   Respiratory: Negative for cough,  "shortness of breath and wheezing.    Cardiovascular: Negative for chest pain and palpitations.   Gastrointestinal: Negative for abdominal pain, diarrhea, nausea and vomiting.   Endocrine: Negative for cold intolerance, heat intolerance and polydipsia.   Genitourinary: Negative for difficulty urinating, dysuria and hematuria.   Musculoskeletal: Positive for arthralgias and myalgias. Negative for joint swelling.   Skin: Negative for rash and wound.   Allergic/Immunologic: Negative for environmental allergies.   Neurological: Negative for dizziness, syncope and numbness.   Hematological: Does not bruise/bleed easily.   Psychiatric/Behavioral: Negative for dysphoric mood and sleep disturbance. The patient is not nervous/anxious.            Objective:  Vitals:    12/06/19 0959   Weight: 98.4 kg (217 lb)   Height: 160 cm (63\")         12/06/19  0959   Weight: 98.4 kg (217 lb)     Body mass index is 38.44 kg/m².    Physical Exam    Vital signs reviewed.   General: No acute distress, alert and oriented  Eyes: conjunctiva clear; pupils equally round and reactive  ENT: external ears and nose atraumatic; oropharynx clear  CV: no peripheral edema  Resp: normal respiratory effort  Skin: no rashes or wounds; normal turgor  Psych: mood and affect appropriate; recent and remote memory intact      Ortho Exam       Left Knee-    ROM 0-120 degrees  4+/5 on flexion  4+/5 on extension  Maximal tenderness medial and lateral patellar facet   Moderate tenderness medial joint line   Positive  Carla medial joint line   Effusion- moderate   No opening on varus and valgus stress at 0 and 30  Active patellar compression test- positive   Log roll-  negative  Stinchfield-  negative  Positive sensation light tough all distributions symmetric to contralateral side  Brisk cap refill all digits  2+ dorsalis pedis pulse      Imaging:  Review of prior outside x-rays from September 22, 2019 of left knee including AP, patella axial, and lateral views " indicate moderate tricompartmental osteoarthritis with bone-on-bone articulation of medial compartment and reactive osteophyte change appreciated, no evidence of fracture, dislocation, or subluxation.    Assessment:        1. Primary osteoarthritis of left knee    2. Pain           Plan:          1. Discussed treatment options at length with patient at today's visit.   2. Patient would like to proceed with cortisone injection today to the left knee. Recommended limited use of affected extremity for the next 24 hours to only essential activites other than work on general active and passive motion. Recommended supplementing with ice and soft tissue massage. Discussed with patient that they should see results in 5-7 days, if no improvement in 5-6 weeks I have asked them to call the office to review other options. Patient should call office immediately if they notice redness, warmth, fevers, chills, or residual numbness or tingling for greater than 6 hours after injection.   3. Recommended home exercises to work on range of motion and strength.  4. If only short-term improvement with cortisone injection may consider other options including but not limited to Visco supplement injection and total knee arthroplasty.        Lisa Gavin and her  were in agreement with plan and had all questions answered.     Orders:  Orders Placed This Encounter   Procedures   • Large Joint Arthrocentesis: L knee       Medications:  No orders of the defined types were placed in this encounter.      Followup:  Return in about 6 weeks (around 1/17/2020).    Lisa was seen today for follow-up.    Diagnoses and all orders for this visit:    Primary osteoarthritis of left knee    Pain  -     Large Joint Arthrocentesis: L knee      By signing my name here, I Miladys Quezada, attest that all documentation on 12/06/19 at 3:16 PM has been prepared under the direction and in the presence of Dr. Sheldon Reid.    I, Dr. Sheldon Reid,  personally performed the services described in this documentation, as scribed by Miladys Quezada, in my presence, and it is both accurate and complete.      Dictated utilizing Dragon dictation

## 2020-05-26 ENCOUNTER — OFFICE VISIT (OUTPATIENT)
Dept: ORTHOPEDIC SURGERY | Facility: CLINIC | Age: 70
End: 2020-05-26

## 2020-05-26 VITALS — HEIGHT: 63 IN | WEIGHT: 213 LBS | BODY MASS INDEX: 37.74 KG/M2

## 2020-05-26 DIAGNOSIS — M17.12 PRIMARY OSTEOARTHRITIS OF LEFT KNEE: Primary | ICD-10-CM

## 2020-05-26 PROCEDURE — 99213 OFFICE O/P EST LOW 20 MIN: CPT | Performed by: ORTHOPAEDIC SURGERY

## 2020-05-26 PROCEDURE — 20610 DRAIN/INJ JOINT/BURSA W/O US: CPT | Performed by: ORTHOPAEDIC SURGERY

## 2020-05-26 RX ORDER — VITAMIN E 268 MG
400 CAPSULE ORAL DAILY
COMMUNITY

## 2020-05-26 RX ORDER — NAPROXEN 500 MG/1
500 TABLET ORAL DAILY
COMMUNITY
Start: 2020-05-16 | End: 2022-11-19 | Stop reason: HOSPADM

## 2020-05-26 RX ORDER — OMEPRAZOLE 40 MG/1
40 CAPSULE, DELAYED RELEASE ORAL DAILY
COMMUNITY
Start: 2020-05-16

## 2020-05-26 RX ORDER — TRIAMCINOLONE ACETONIDE 40 MG/ML
80 INJECTION, SUSPENSION INTRA-ARTICULAR; INTRAMUSCULAR
Status: COMPLETED | OUTPATIENT
Start: 2020-05-26 | End: 2020-05-26

## 2020-05-26 RX ORDER — LIDOCAINE HYDROCHLORIDE 10 MG/ML
8 INJECTION, SOLUTION EPIDURAL; INFILTRATION; INTRACAUDAL; PERINEURAL
Status: COMPLETED | OUTPATIENT
Start: 2020-05-26 | End: 2020-05-26

## 2020-05-26 RX ADMIN — TRIAMCINOLONE ACETONIDE 80 MG: 40 INJECTION, SUSPENSION INTRA-ARTICULAR; INTRAMUSCULAR at 09:04

## 2020-05-26 RX ADMIN — LIDOCAINE HYDROCHLORIDE 8 ML: 10 INJECTION, SOLUTION EPIDURAL; INFILTRATION; INTRACAUDAL; PERINEURAL at 09:04

## 2020-05-26 NOTE — PROGRESS NOTES
Procedure   Large Joint Arthrocentesis: L knee  Date/Time: 5/26/2020 9:04 AM  Consent given by: patient  Site marked: site marked  Timeout: Immediately prior to procedure a time out was called to verify the correct patient, procedure, equipment, support staff and site/side marked as required   Supporting Documentation  Indications: pain   Procedure Details  Location: knee - L knee  Preparation: Patient was prepped and draped in the usual sterile fashion  Needle size: 22 G  Approach: superior  Medications administered: 80 mg triamcinolone acetonide 40 MG/ML; 8 mL lidocaine PF 1% 1 %  Patient tolerance: patient tolerated the procedure well with no immediate complications

## 2020-05-26 NOTE — PROGRESS NOTES
Subjective:     Patient ID: Lisa Gavin is a 69 y.o. female.    Chief Complaint: follow-up left knee pain    History of Present Illness  Lisa Gavin returns to clinic today for evaluation of her left knee. The patient had prior cortisone injection to the left knee  on 12/6/2019. She notes approximately 90% improvement of the pain with the injection, lasting 5 months. Today, she rates the pain as 10/10, describes it as aching in nature.  Localizes pain to the medial aspect. She denies any new injuries.  She denies associated numbness or tingling. She notes occasional radiation of her pain down her leg to her ankle.      Social History     Occupational History   • Not on file   Tobacco Use   • Smoking status: Never Smoker   • Smokeless tobacco: Never Used   Substance and Sexual Activity   • Alcohol use: No   • Drug use: No   • Sexual activity: Defer      Past Medical History:   Diagnosis Date   • A-fib (CMS/[facility])    • Arthritis    • Depression    • GERD (gastroesophageal reflux disease)    • Headache    • Sleep apnea    • Type 2 diabetes mellitus (CMS/[facility])      Past Surgical History:   Procedure Laterality Date   • CHOLECYSTECTOMY     • HYSTERECTOMY     • ROTATOR CUFF REPAIR      ACUTE   • SHOULDER SURGERY Bilateral     hAS HAD ROTATOR CUFF SURGERY ON BOTH SHOULDERS       Family History   Problem Relation Age of Onset   • Cancer Other    • Diabetes Other    • Glaucoma Other    • Rheum arthritis Other    • Kidney disease Other          Review of Systems   Constitutional: Negative for chills, diaphoresis, fever and unexpected weight change.   HENT: Negative for hearing loss, nosebleeds, sore throat and tinnitus.    Eyes: Negative for pain and visual disturbance.   Respiratory: Negative for cough, shortness of breath and wheezing.    Cardiovascular: Negative for chest pain and palpitations.   Gastrointestinal: Negative for abdominal pain, diarrhea, nausea and vomiting.   Endocrine: Negative for cold intolerance,  "heat intolerance and polydipsia.   Genitourinary: Negative for difficulty urinating, dysuria and hematuria.   Musculoskeletal: Positive for arthralgias and myalgias. Negative for joint swelling.   Skin: Negative for rash and wound.   Allergic/Immunologic: Negative for environmental allergies.   Neurological: Negative for dizziness, syncope and numbness.   Hematological: Does not bruise/bleed easily.   Psychiatric/Behavioral: Negative for dysphoric mood and sleep disturbance. The patient is not nervous/anxious.            Objective:  Vitals:    05/26/20 0835   Weight: 96.6 kg (213 lb)   Height: 160 cm (63\")         05/26/20  0835   Weight: 96.6 kg (213 lb)     Body mass index is 37.73 kg/m².  General: No acute distress.  Resp: normal respiratory effort  Skin: no rashes or wounds; normal turgor  Psych: mood and affect appropriate; recent and remote memory intact      Ortho Exam     Left Knee-    ROM 4-110 degrees  4+/5 on flexion  4+/5 on extension  Maximal tenderness medial joint line   Effusion- moderate   Log roll-  negative  Stinchfield-  negative  Positive sensation light tough all distributions symmetric to contralateral side  Brisk cap refill all digits  2+ dorsalis pedis pulse    Imaging:  None  Assessment:        1. Primary osteoarthritis of left knee           Plan:          1. Discussed treatment options at length with patient at today's visit.   2. Patient would like to proceed with cortisone injection today to the left knee. Recommended limited use of affected extremity for the next 24 hours to only essential activites other than work on general active and passive motion. Recommended supplementing with ice and soft tissue massage. Discussed with patient that they should see results in 5-7 days, if no improvement in 5-6 weeks I have asked them to call the office to review other options. Patient should call office immediately if they notice redness, warmth, fevers, chills, or residual numbness or tingling " for greater than 6 hours after injection.   3. We may consider other treatment options such as gel injections if her pain does not improve with the cortisone injection. Patient is not interested in surgical treatment at this time.   4. 10 minutes out of 15 minutes face-to-face time was spent counseling patient extensively on exercise, opportunities for weight loss with goal weight loss of 30 lbs, options in regards to underlying pathology including but not limited to surgical options including arthroplasty as well as injections.         Lisa Gavin was in agreement with plan and had all questions answered.     Orders:  Orders Placed This Encounter   Procedures   • Large Joint Arthrocentesis: L knee       Medications:  No orders of the defined types were placed in this encounter.      Followup:  Return if symptoms worsen or fail to improve.    Lisa was seen today for follow-up and pain.    Diagnoses and all orders for this visit:    Primary osteoarthritis of left knee  -     Large Joint Arthrocentesis: L knee        By signing my name here, I Miladys Quezada, attest that all documentation on 05/26/20 at 09:19 has been prepared under the direction and in the presence of Dr. Sheldon Reid.    I, Dr. Sheldon Reid, personally performed the services described in this documentation, as scribed by Miladys Quezada, in my presence, and it is both accurate and complete.      Dictated utilizing Dragon dictation

## 2020-09-03 ENCOUNTER — TRANSCRIBE ORDERS (OUTPATIENT)
Dept: ADMINISTRATIVE | Facility: HOSPITAL | Age: 70
End: 2020-09-03

## 2020-09-03 ENCOUNTER — HOSPITAL ENCOUNTER (OUTPATIENT)
Dept: GENERAL RADIOLOGY | Facility: HOSPITAL | Age: 70
Discharge: HOME | End: 2020-09-03
Admitting: ORTHOPAEDIC SURGERY

## 2020-09-03 DIAGNOSIS — M54.9 BACK PAIN, UNSPECIFIED BACK LOCATION, UNSPECIFIED BACK PAIN LATERALITY, UNSPECIFIED CHRONICITY: Primary | ICD-10-CM

## 2020-09-03 DIAGNOSIS — M54.9 BACK PAIN, UNSPECIFIED BACK LOCATION, UNSPECIFIED BACK PAIN LATERALITY, UNSPECIFIED CHRONICITY: ICD-10-CM

## 2020-09-03 PROCEDURE — 72100 X-RAY EXAM L-S SPINE 2/3 VWS: CPT

## 2020-10-01 ENCOUNTER — OFFICE VISIT (OUTPATIENT)
Dept: ORTHOPEDIC SURGERY | Facility: CLINIC | Age: 70
End: 2020-10-01

## 2020-10-01 VITALS — WEIGHT: 222 LBS | HEIGHT: 63 IN | BODY MASS INDEX: 39.34 KG/M2

## 2020-10-01 DIAGNOSIS — M17.12 PRIMARY OSTEOARTHRITIS OF LEFT KNEE: Primary | ICD-10-CM

## 2020-10-01 PROCEDURE — 20610 DRAIN/INJ JOINT/BURSA W/O US: CPT | Performed by: ORTHOPAEDIC SURGERY

## 2020-10-01 PROCEDURE — 99213 OFFICE O/P EST LOW 20 MIN: CPT | Performed by: ORTHOPAEDIC SURGERY

## 2020-10-01 PROCEDURE — 73562 X-RAY EXAM OF KNEE 3: CPT | Performed by: ORTHOPAEDIC SURGERY

## 2020-10-01 RX ORDER — ATORVASTATIN CALCIUM 20 MG/1
20 TABLET, FILM COATED ORAL NIGHTLY
COMMUNITY
Start: 2020-09-22

## 2020-10-01 RX ADMIN — TRIAMCINOLONE ACETONIDE 80 MG: 40 INJECTION, SUSPENSION INTRA-ARTICULAR; INTRAMUSCULAR at 13:50

## 2020-10-01 RX ADMIN — LIDOCAINE HYDROCHLORIDE 8 ML: 10 INJECTION, SOLUTION EPIDURAL; INFILTRATION; INTRACAUDAL; PERINEURAL at 13:50

## 2020-10-01 NOTE — PROGRESS NOTES
Subjective:     Patient ID: Lisa Gavin is a 69 y.o. female.    Chief Complaint: follow-up left knee pain, DJD  Last injection left knee 05/26/2020    History of Present Illness  Lisa Gavin returns to clinic today for evaluation of her left knee. She complains of worsening knee pain over the past 3 weeks. The patient had prior cortisone injection to the left knee on 05/26/2020. She notes approximately 70% improvement of the pain with the injection, lasting about 3 months. She rates the pain as 8-9/10, describes it as aching in nature. Localizes pain to the medial aspect of the knee, with some radiation down into the leg. Has noted improvement with rest. Symptoms are exacerbated with activity. Denies radiation of pain, denies associated numbness or tingling.       Social History     Occupational History   • Not on file   Tobacco Use   • Smoking status: Never Smoker   • Smokeless tobacco: Never Used   Substance and Sexual Activity   • Alcohol use: No   • Drug use: No   • Sexual activity: Defer      Past Medical History:   Diagnosis Date   • A-fib (CMS/[facility])    • Arthritis    • Depression    • GERD (gastroesophageal reflux disease)    • Headache    • Sleep apnea    • Type 2 diabetes mellitus (CMS/[facility])      Past Surgical History:   Procedure Laterality Date   • CHOLECYSTECTOMY     • HYSTERECTOMY     • ROTATOR CUFF REPAIR      ACUTE   • SHOULDER SURGERY Bilateral     hAS HAD ROTATOR CUFF SURGERY ON BOTH SHOULDERS       Family History   Problem Relation Age of Onset   • Cancer Other    • Diabetes Other    • Glaucoma Other    • Rheum arthritis Other    • Kidney disease Other          Review of Systems   Constitutional: Negative for chills, diaphoresis, fever and unexpected weight change.   HENT: Negative for hearing loss, nosebleeds, sore throat and tinnitus.    Eyes: Negative for pain and visual disturbance.   Respiratory: Negative for cough, shortness of breath and wheezing.    Cardiovascular: Negative for  "chest pain and palpitations.   Gastrointestinal: Negative for abdominal pain, diarrhea, nausea and vomiting.   Endocrine: Negative for cold intolerance, heat intolerance and polydipsia.   Genitourinary: Negative for difficulty urinating, dysuria and hematuria.   Musculoskeletal: Positive for arthralgias and myalgias. Negative for joint swelling.   Skin: Negative for rash and wound.   Allergic/Immunologic: Negative for environmental allergies.   Neurological: Negative for dizziness, syncope and numbness.   Hematological: Does not bruise/bleed easily.   Psychiatric/Behavioral: Negative for dysphoric mood and sleep disturbance. The patient is not nervous/anxious.            Objective:  Vitals:    10/01/20 1245   Weight: 101 kg (222 lb)   Height: 160 cm (63\")         10/01/20  1245   Weight: 101 kg (222 lb)     Body mass index is 39.33 kg/m².    General: No acute distress.  Resp: normal respiratory effort  Skin: no rashes or wounds; normal turgor  Psych: mood and affect appropriate; recent and remote memory intact        Ortho Exam       Left Knee-    ROM 2-95 degrees  4/5 on flexion  4/5 on extension    Positive sensation light tough all distributions symmetric to contralateral side  Brisk cap refill all digits  2+ dorsalis pedis pulse      Imaging:  Review again of the outside previous x-rays indicate bone-on-bone articulation with significant osteophyte formation and end-stage arthritis of left knee particularly medial compartment with varus alignment.    Assessment:        1. Primary osteoarthritis of left knee           Plan:  Large Joint Arthrocentesis: L knee  Date/Time: 10/1/2020 1:50 PM  Consent given by: patient  Site marked: site marked  Timeout: Immediately prior to procedure a time out was called to verify the correct patient, procedure, equipment, support staff and site/side marked as required   Supporting Documentation  Indications: pain   Procedure Details  Location: knee - L knee  Preparation: Patient " was prepped and draped in the usual sterile fashion  Needle size: 22 G  Approach: superior (LATERAL)  Medications administered: 8 mL lidocaine PF 1% 1 %; 80 mg triamcinolone acetonide 40 MG/ML  Patient tolerance: patient tolerated the procedure well with no immediate complications                1. Discussed treatment options at length with patient at today's visit, including cortisone injection vs total knee arthroplasty. Given good results with previous injection, patient would like to repeat it today.  2. Patient would like to proceed with cortisone injection today to the left knee. Recommended limited use of affected extremity for the next 24 hours to only essential activites other than work on general active and passive motion. Recommended supplementing with ice and soft tissue massage. Discussed with patient that they should see results in 5-7 days, if no improvement in 5-6 weeks I have asked them to call the office to review other options. Patient should call office immediately if they notice redness, warmth, fevers, chills, or residual numbness or tingling for greater than 6 hours after injection.   3. Follow-up with me in 3 months for re-evaluation  4. 10 minutes out of 15 minutes face-to-face time was spent counseling patient extensively on exercise, opportunities for weight loss with goal weight loss of 20 lbs, options in regards to underlying pathology including but not limited to surgical options including arthroplasty as well as injections.       Lisa Gavin was in agreement with plan and had all questions answered.     Orders:  Orders Placed This Encounter   Procedures   • Large Joint Arthrocentesis: L knee   • XR Knee 3 View Left       Medications:  No orders of the defined types were placed in this encounter.      Followup:  Return in about 3 months (around 1/1/2021).    Lisa was seen today for follow-up and pain.    Diagnoses and all orders for this visit:    Primary osteoarthritis of  left knee  -     XR Knee 3 View Left    Other orders  -     Large Joint Arthrocentesis: L knee           By signing my name here, I Vicki Barriga attest that all documentation on 10/02/20 at 17:18 EDT has been prepared under the direction and in the presence of Dr. Sheldon Reid MD.    I, Dr. Sheldon Reid, personally performed the services described in this documentation, as scribed by Vicki Barriga, in my presence, and it is both accurate and complete.        Dictated utilizing Dragon dictation

## 2020-10-02 RX ORDER — LIDOCAINE HYDROCHLORIDE 10 MG/ML
8 INJECTION, SOLUTION EPIDURAL; INFILTRATION; INTRACAUDAL; PERINEURAL
Status: COMPLETED | OUTPATIENT
Start: 2020-10-01 | End: 2020-10-01

## 2020-10-02 RX ORDER — TRIAMCINOLONE ACETONIDE 40 MG/ML
80 INJECTION, SUSPENSION INTRA-ARTICULAR; INTRAMUSCULAR
Status: COMPLETED | OUTPATIENT
Start: 2020-10-01 | End: 2020-10-01

## 2021-01-22 ENCOUNTER — OFFICE VISIT (OUTPATIENT)
Dept: ORTHOPEDIC SURGERY | Facility: CLINIC | Age: 71
End: 2021-01-22

## 2021-01-22 VITALS — BODY MASS INDEX: 39.34 KG/M2 | HEIGHT: 63 IN | WEIGHT: 222 LBS

## 2021-01-22 DIAGNOSIS — M17.12 PRIMARY OSTEOARTHRITIS OF LEFT KNEE: Primary | ICD-10-CM

## 2021-01-22 PROBLEM — R15.2 FECAL URGENCY: Status: ACTIVE | Noted: 2018-03-27

## 2021-01-22 PROBLEM — E11.9 DIABETES MELLITUS TYPE 2 WITHOUT RETINOPATHY: Status: ACTIVE | Noted: 2020-12-04

## 2021-01-22 PROBLEM — S80.10XA HEMATOMA OF LOWER LIMB: Status: ACTIVE | Noted: 2017-01-10

## 2021-01-22 PROBLEM — K58.0 IRRITABLE BOWEL SYNDROME WITH DIARRHEA: Status: ACTIVE | Noted: 2018-03-27

## 2021-01-22 PROBLEM — R13.10 DYSPHAGIA: Status: ACTIVE | Noted: 2018-03-27

## 2021-01-22 PROBLEM — M19.90 ARTHRITIS: Status: ACTIVE | Noted: 2019-09-26

## 2021-01-22 PROBLEM — K31.84 GASTROPARESIS: Status: ACTIVE | Noted: 2018-11-29

## 2021-01-22 PROBLEM — S83.90XA SPRAIN OF KNEE: Status: ACTIVE | Noted: 2019-09-26

## 2021-01-22 PROBLEM — S76.309A HAMSTRING INJURY: Status: ACTIVE | Noted: 2019-10-17

## 2021-01-22 PROBLEM — M25.362 INSTABILITY OF LEFT KNEE JOINT: Status: ACTIVE | Noted: 2017-08-29

## 2021-01-22 PROBLEM — R19.7 DIARRHEA: Status: ACTIVE | Noted: 2018-03-27

## 2021-01-22 PROBLEM — G47.33 OSA (OBSTRUCTIVE SLEEP APNEA): Status: ACTIVE | Noted: 2017-05-10

## 2021-01-22 PROCEDURE — 20610 DRAIN/INJ JOINT/BURSA W/O US: CPT | Performed by: ORTHOPAEDIC SURGERY

## 2021-01-22 PROCEDURE — 99213 OFFICE O/P EST LOW 20 MIN: CPT | Performed by: ORTHOPAEDIC SURGERY

## 2021-01-22 RX ADMIN — TRIAMCINOLONE ACETONIDE 80 MG: 40 INJECTION, SUSPENSION INTRA-ARTICULAR; INTRAMUSCULAR at 14:15

## 2021-01-22 RX ADMIN — LIDOCAINE HYDROCHLORIDE 8 ML: 10 INJECTION, SOLUTION EPIDURAL; INFILTRATION; INTRACAUDAL; PERINEURAL at 14:15

## 2021-01-22 NOTE — PROGRESS NOTES
Subjective:     Patient ID: Lisa Gavin is a 70 y.o. female.    Chief Complaint:  Follow-up left knee pain, DJD  Last injection 10/1/2020 left knee  History of Present Illness  Lisa Gavin returns to clinic today for evaluation of left knee, states that her last injection helped her very well with significant improvement of her pain approximately 90% and is lasted up until the last week or 2, since then she has had mild recurrence of pain localized to medial aspect of the left knee rates this is a 6-8 out of 10 and aching in nature with exacerbation with deep flexion, walking, and rotational activities.  Denies any helena locking or catching.  Denies radiation of pain, denies associated numbness or tingling.     Social History     Occupational History   • Not on file   Tobacco Use   • Smoking status: Never Smoker   • Smokeless tobacco: Never Used   Substance and Sexual Activity   • Alcohol use: No   • Drug use: No   • Sexual activity: Defer      Past Medical History:   Diagnosis Date   • A-fib (CMS/[facility])    • Arthritis    • Depression    • GERD (gastroesophageal reflux disease)    • Headache    • Sleep apnea    • Type 2 diabetes mellitus (CMS/[facility])      Past Surgical History:   Procedure Laterality Date   • CHOLECYSTECTOMY     • HYSTERECTOMY     • ROTATOR CUFF REPAIR      ACUTE   • SHOULDER SURGERY Bilateral     hAS HAD ROTATOR CUFF SURGERY ON BOTH SHOULDERS       Family History   Problem Relation Age of Onset   • Cancer Other    • Diabetes Other    • Glaucoma Other    • Rheum arthritis Other    • Kidney disease Other          Review of Systems   Constitutional: Negative for chills, diaphoresis, fever and unexpected weight change.   HENT: Negative for hearing loss, nosebleeds, sore throat and tinnitus.    Eyes: Negative for pain and visual disturbance.   Respiratory: Negative for cough, shortness of breath and wheezing.    Cardiovascular: Negative for chest pain and palpitations.   Gastrointestinal: Negative  "for abdominal pain, diarrhea, nausea and vomiting.   Endocrine: Negative for cold intolerance, heat intolerance and polydipsia.   Genitourinary: Negative for difficulty urinating, dysuria and hematuria.   Musculoskeletal: Positive for arthralgias and myalgias. Negative for joint swelling.   Skin: Negative for rash and wound.   Allergic/Immunologic: Negative for environmental allergies.   Neurological: Negative for dizziness, syncope and numbness.   Hematological: Does not bruise/bleed easily.   Psychiatric/Behavioral: Negative for dysphoric mood and sleep disturbance. The patient is not nervous/anxious.            Objective:  Vitals:    01/22/21 1359   Weight: 101 kg (222 lb)   Height: 160 cm (63\")         01/22/21  1359   Weight: 101 kg (222 lb)     Body mass index is 39.33 kg/m².  General: No acute distress.  Resp: normal respiratory effort  Skin: no rashes or wounds; normal turgor  Psych: mood and affect appropriate; recent and remote memory intact          Ortho Exam     Left Knee-     ROM 2-115 degrees  4/5 on flexion  4/5 on extension  Moderate effusion   Positive sensation light tough all distributions symmetric to contralateral side  Brisk cap refill all digits  2+ dorsalis pedis pulse    Imaging:  None    Assessment:        1. Primary osteoarthritis of left knee           Plan:  Large Joint Arthrocentesis: L knee  Date/Time: 1/22/2021 2:15 PM  Consent given by: patient  Site marked: site marked  Timeout: Immediately prior to procedure a time out was called to verify the correct patient, procedure, equipment, support staff and site/side marked as required   Supporting Documentation  Indications: pain   Procedure Details  Location: knee - L knee  Preparation: Patient was prepped and draped in the usual sterile fashion  Needle size: 22 G  Approach: superior  Medications administered: 8 mL lidocaine PF 1% 1 %; 80 mg triamcinolone acetonide 40 MG/ML  Patient tolerance: patient tolerated the procedure well with " no immediate complications                1. Discussed treatment options at length with patient at today's visit.  Given significant recurrence of pain we discussed options and patient wished proceed with intra-articular injection.  We also discussed option for total knee arthroplasty but she has declined that at this time.  2. Patient would like to proceed with cortisone injection today to the left knee. Recommended limited use of affected extremity for the next 24 hours to only essential activites other than work on general active and passive motion. Recommended supplementing with ice and soft tissue massage. Discussed with patient that they should see results in 5-7 days, if no improvement in 5-6 weeks I have asked them to call the office to review other options. Patient should call office immediately if they notice redness, warmth, fevers, chills, or residual numbness or tingling for greater than 6 hours after injection.   3. Follow-up in 3 months for reevaluation.  Counseled patient regards to weight loss endeavors including dieting and exercise to help reduce pressure on the and associated pain from her arthritic changes      Lisa Gvain was in agreement with plan and had all questions answered.     Orders:  Orders Placed This Encounter   Procedures   • Large Joint Arthrocentesis: L knee       Medications:  No orders of the defined types were placed in this encounter.      Followup:  No follow-ups on file.    Diagnoses and all orders for this visit:    1. Primary osteoarthritis of left knee (Primary)  -     Large Joint Arthrocentesis: L knee          Dictated utilizing Dragon dictation

## 2021-01-25 RX ORDER — TRIAMCINOLONE ACETONIDE 40 MG/ML
80 INJECTION, SUSPENSION INTRA-ARTICULAR; INTRAMUSCULAR
Status: COMPLETED | OUTPATIENT
Start: 2021-01-22 | End: 2021-01-22

## 2021-01-25 RX ORDER — LIDOCAINE HYDROCHLORIDE 10 MG/ML
8 INJECTION, SOLUTION EPIDURAL; INFILTRATION; INTRACAUDAL; PERINEURAL
Status: COMPLETED | OUTPATIENT
Start: 2021-01-22 | End: 2021-01-22

## 2021-02-12 ENCOUNTER — OFFICE VISIT (OUTPATIENT)
Dept: ORTHOPEDIC SURGERY | Facility: CLINIC | Age: 71
End: 2021-02-12

## 2021-02-12 VITALS — BODY MASS INDEX: 39.34 KG/M2 | HEIGHT: 63 IN | WEIGHT: 222 LBS

## 2021-02-12 DIAGNOSIS — M75.82 TENDINITIS OF LEFT ROTATOR CUFF: ICD-10-CM

## 2021-02-12 DIAGNOSIS — R52 PAIN: Primary | ICD-10-CM

## 2021-02-12 DIAGNOSIS — R29.898 SHOULDER WEAKNESS: ICD-10-CM

## 2021-02-12 PROCEDURE — 73562 X-RAY EXAM OF KNEE 3: CPT | Performed by: ORTHOPAEDIC SURGERY

## 2021-02-12 PROCEDURE — 99214 OFFICE O/P EST MOD 30 MIN: CPT | Performed by: ORTHOPAEDIC SURGERY

## 2021-02-12 RX ORDER — FUROSEMIDE 20 MG/1
TABLET ORAL
COMMUNITY
Start: 2021-02-05 | End: 2021-05-06

## 2021-02-12 NOTE — PROGRESS NOTES
Subjective:     Patient ID: Lisa Gavin is a 70 y.o. female.    Chief Complaint: left shoulder pain, new problem    History of Present Illness  Lisa Gavin returns to clinic today for evaluation of left shoulder pain localized anterior/lateral with lateral radiation down the arm. Her pain is rated 5/10 in severity today and is aching and stabbing in quality.  The pain is aggravated by lifting and overhead motion but alleviated by medication and rest.  She is s/p left shoulder surgery over 13 years ago in [place]. Patient denies tingling and numbness.  Patient additionally notes she is s/p L knee injection on 1/22/2021 with less pain relief than previous injections.       Social History     Occupational History   • Not on file   Tobacco Use   • Smoking status: Never Smoker   • Smokeless tobacco: Never Used   Substance and Sexual Activity   • Alcohol use: No   • Drug use: No   • Sexual activity: Defer      Past Medical History:   Diagnosis Date   • A-fib (CMS/[facility])    • Arthritis    • Depression    • GERD (gastroesophageal reflux disease)    • Headache    • Sleep apnea    • Type 2 diabetes mellitus (CMS/[facility])      Past Surgical History:   Procedure Laterality Date   • CHOLECYSTECTOMY     • HYSTERECTOMY     • ROTATOR CUFF REPAIR      ACUTE   • SHOULDER SURGERY Bilateral     hAS HAD ROTATOR CUFF SURGERY ON BOTH SHOULDERS       Family History   Problem Relation Age of Onset   • Cancer Other    • Diabetes Other    • Glaucoma Other    • Rheum arthritis Other    • Kidney disease Other          Review of Systems   Constitutional: Negative for chills, diaphoresis, fever and unexpected weight change.   HENT: Negative for hearing loss, nosebleeds, sore throat and tinnitus.    Eyes: Negative for pain and visual disturbance.   Respiratory: Negative for cough, shortness of breath and wheezing.    Cardiovascular: Negative for chest pain and palpitations.   Gastrointestinal: Negative for abdominal pain, diarrhea, nausea  "and vomiting.   Endocrine: Negative for cold intolerance, heat intolerance and polydipsia.   Genitourinary: Negative for difficulty urinating, dysuria and hematuria.   Musculoskeletal: Positive for arthralgias. Negative for joint swelling and myalgias.   Skin: Negative for rash and wound.   Allergic/Immunologic: Negative for environmental allergies.   Neurological: Negative for dizziness, syncope and numbness.   Hematological: Does not bruise/bleed easily.   Psychiatric/Behavioral: Negative for dysphoric mood and sleep disturbance. The patient is not nervous/anxious.    All other systems reviewed and are negative.          Objective:  Vitals:    02/12/21 1019   Weight: 101 kg (222 lb)   Height: 160 cm (63\")         02/12/21  1019   Weight: 101 kg (222 lb)     Body mass index is 39.33 kg/m².  Physical Exam    Vital signs reviewed.   General: No acute distress, alert and oriented  Eyes: conjunctiva clear; pupils equally round and reactive  ENT: external ears and nose atraumatic; oropharynx clear  CV: no peripheral edema  Resp: normal respiratory effort  Skin: no rashes or wounds; normal turgor  Psych: mood and affect appropriate; recent and remote memory intact          Ortho Exam     left shoulder-  Tenderness  located anterior, lateral  FF-   Active- 120    Passive- 155   Strength- 3/5  ER-      Active- 15   Passive 40   Strength- 4-/5  IR        Strength- 4+/5 on belly press test  Bear hug sign-negative  Empty can test- positive    Neer's sign- positive  Llanos- positive    Cross arm test- positive mild  Tenderness over AC joint- positive mild    Brisk cap refill to all digits, 1+ palpable radial pulse  4+/5 strength abduction with good deltoid firing  Positive sensation to light touch palmar, dorsal aspects of small and index fingers and anatomic snuffbox left hand        Imaging:  Left Shoulder X-Ray  Indication: Pain  AP, scapular Y, and axillary lateral views    Findings:  No fracture  No bony lesion  Normal " soft tissues  Retained metal anchors likely from prior rotator cuff repair, moderate glenohumeral joint space narrowing with mild humeral head elevation appreciated, prior distal clavicle resection noted    No prior studies were available for comparison.    Assessment:        1. Pain    2. Shoulder weakness    3. Tendinitis of left rotator cuff           Plan:          1. Discussed treatment options at length with patient at today's visit including anti-inflammatories, MRI of left shoulder.  2. Patient opts for MRI arthrogram left shoulder with contrast.  3. Patient to follow-up as needed if knee pain is unresolved.  4. Follow-up with MRI results and re-evaluate.  5. Continue home exercises in the meantime to work on range of motion and strength as tolerated.  Anti-inflammatory medications over-the-counter for improvement in pain      Lisa Gavin was in agreement with plan and had all questions answered.     Orders:  Orders Placed This Encounter   Procedures   • XR Knee 3 View Left   • MRI Shoulder Left Without Contrast       Medications:  No orders of the defined types were placed in this encounter.      Followup:  Return for review of MRI results.    Diagnoses and all orders for this visit:    1. Pain (Primary)  -     XR Knee 3 View Left  -     MRI Shoulder Left Without Contrast; Future    2. Shoulder weakness  -     MRI Shoulder Left Without Contrast; Future    3. Tendinitis of left rotator cuff  -     MRI Shoulder Left Without Contrast; Future        SCRIBE ATTESTATION:  I, Pacheco Recinos, attest that all medical record entries for this patient were documented by me acting as a medical scribe for Sheldon Reid MD.    PROVIDER ATTESTATION:  ISheldon MD, personally performed the services described in this documentation. All medical record entries made by the scribe were at my direction and in my presence. I have reviewed the chart and discharge instructions and agree that the record reflects  my personal performance and is accurate and complete.  Sheldon Reid MD.    Electronically signed: Sheldon Reid MD 2/12/2021 10:51 EST       Dictated utilizing Dragon dictation

## 2021-03-02 ENCOUNTER — HOSPITAL ENCOUNTER (OUTPATIENT)
Dept: MRI IMAGING | Facility: HOSPITAL | Age: 71
Discharge: HOME | End: 2021-03-02
Admitting: ORTHOPAEDIC SURGERY

## 2021-03-02 DIAGNOSIS — R29.898 SHOULDER WEAKNESS: ICD-10-CM

## 2021-03-02 DIAGNOSIS — M75.82 TENDINITIS OF LEFT ROTATOR CUFF: ICD-10-CM

## 2021-03-02 DIAGNOSIS — R52 PAIN: ICD-10-CM

## 2021-03-02 PROCEDURE — 73221 MRI JOINT UPR EXTREM W/O DYE: CPT

## 2021-03-17 ENCOUNTER — TRANSCRIBE ORDERS (OUTPATIENT)
Dept: ENDOCRINOLOGY | Age: 71
End: 2021-03-17

## 2021-03-17 DIAGNOSIS — E05.90 HYPERTHYROIDISM: Primary | ICD-10-CM

## 2021-03-24 ENCOUNTER — OFFICE VISIT (OUTPATIENT)
Dept: ORTHOPEDIC SURGERY | Facility: CLINIC | Age: 71
End: 2021-03-24

## 2021-03-24 DIAGNOSIS — R29.898 SHOULDER WEAKNESS: Primary | ICD-10-CM

## 2021-03-24 DIAGNOSIS — M75.82 TENDINITIS OF LEFT ROTATOR CUFF: ICD-10-CM

## 2021-03-24 DIAGNOSIS — M75.112 NONTRAUMATIC INCOMPLETE TEAR OF LEFT ROTATOR CUFF: ICD-10-CM

## 2021-03-24 DIAGNOSIS — M19.012 PRIMARY LOCALIZED OSTEOARTHROSIS OF LEFT SHOULDER REGION: ICD-10-CM

## 2021-03-24 DIAGNOSIS — M75.22 BICEPS TENDINITIS OF LEFT UPPER EXTREMITY: ICD-10-CM

## 2021-03-24 PROCEDURE — 99214 OFFICE O/P EST MOD 30 MIN: CPT | Performed by: ORTHOPAEDIC SURGERY

## 2021-03-24 PROCEDURE — 20610 DRAIN/INJ JOINT/BURSA W/O US: CPT | Performed by: ORTHOPAEDIC SURGERY

## 2021-03-24 RX ORDER — LIDOCAINE HYDROCHLORIDE 10 MG/ML
4 INJECTION, SOLUTION EPIDURAL; INFILTRATION; INTRACAUDAL; PERINEURAL
Status: COMPLETED | OUTPATIENT
Start: 2021-03-24 | End: 2021-03-24

## 2021-03-24 RX ORDER — TRIAMCINOLONE ACETONIDE 40 MG/ML
80 INJECTION, SUSPENSION INTRA-ARTICULAR; INTRAMUSCULAR
Status: COMPLETED | OUTPATIENT
Start: 2021-03-24 | End: 2021-03-24

## 2021-03-24 RX ADMIN — TRIAMCINOLONE ACETONIDE 80 MG: 40 INJECTION, SUSPENSION INTRA-ARTICULAR; INTRAMUSCULAR at 10:30

## 2021-03-24 RX ADMIN — LIDOCAINE HYDROCHLORIDE 4 ML: 10 INJECTION, SOLUTION EPIDURAL; INFILTRATION; INTRACAUDAL; PERINEURAL at 10:30

## 2021-03-24 NOTE — PROGRESS NOTES
Subjective:     Patient ID: Lisa Gavin is a 70 y.o. female.    Chief Complaint:  Follow-up left shoulder pain  Follow-up MRI results  History of Present Illness  Lisa Gavin returns to clinic today for evaluation of left shoulder, states that her pain has been fairly stable since last visit, continues to rated as a 5-6 out of 10 and aching in nature with occasional sharp pain particular with overhead motion as well as any lifting activities.  Her weakness does tend to wax and wane, she has had some slight increase in night pain particular with laying on her left side in the last week or 2.  Denies associated numbness or tingling, does note mild radiation down the lateral aspect of the arm but not below the elbow.     Social History     Occupational History   • Not on file   Tobacco Use   • Smoking status: Never Smoker   • Smokeless tobacco: Never Used   Vaping Use   • Vaping Use: Never used   Substance and Sexual Activity   • Alcohol use: No   • Drug use: No   • Sexual activity: Defer      Past Medical History:   Diagnosis Date   • A-fib (CMS/[facility])    • Arthritis    • Depression    • GERD (gastroesophageal reflux disease)    • Headache    • Sleep apnea    • Type 2 diabetes mellitus (CMS/[facility])      Past Surgical History:   Procedure Laterality Date   • CHOLECYSTECTOMY     • HYSTERECTOMY     • ROTATOR CUFF REPAIR      ACUTE   • SHOULDER SURGERY Bilateral     hAS HAD ROTATOR CUFF SURGERY ON BOTH SHOULDERS       Family History   Problem Relation Age of Onset   • Cancer Other    • Diabetes Other    • Glaucoma Other    • Rheum arthritis Other    • Kidney disease Other          Review of Systems        Objective:  There were no vitals filed for this visit.  There were no vitals filed for this visit.  There is no height or weight on file to calculate BMI.  Physical Exam    Vital signs reviewed.   General: No acute distress, alert and oriented  Eyes: conjunctiva clear; pupils equally round and reactive  ENT:  external ears and nose atraumatic; oropharynx clear  CV: no peripheral edema  Resp: normal respiratory effort  Skin: no rashes or wounds; normal turgor  Psych: mood and affect appropriate; recent and remote memory intact          Ortho Exam     left shoulder-   Tenderness  located anterior and posterior joint line  FF-       Active-135              Passive- 155              Strength-4 -/5  ER-      Active-25              Passive 40              Strength- 4-/5  IR        Strength- 4+/5 on belly press test  Bear hug sign-negative  Empty can test- positive     Neer's sign- positive  Llanos- positive     Cross arm test- positive mild  Tenderness over AC joint- positive mild     Brisk cap refill to all digits, 1+ palpable radial pulse  4+/5 strength abduction with good deltoid firing  Positive sensation to light touch palmar, dorsal aspects of small and index fingers and anatomic snuffbox left hand    Imaging:  MRI Shoulder Left Without Contrast    Result Date: 3/3/2021  Impression: Diffuse attenuation and abnormal signal throughout the supraspinatus and anterior half of the infraspinatus tendon most compatible with acute on chronic tendinosis and high-grade partial-thickness articular sided tear. No definite full-thickness defect but only minimal residual tendon which appears scarred down at the site of prior cuff repair. No muscle atrophy supraspinatus, infraspinatus and subscapularis. Glenohumeral joint effusion with synovitis and subacromial subdeltoid bursitis. Partial tear cranial fibers subscapularis estimated up to 9 mm. Severe biceps tendinosis without tear. Distal clavicular resection and apparent subacromial decompression. Signer Name: UYEN Chen MD  Signed: 3/3/2021 10:59 AM  Workstation Name: [id number]  [facility]      Review of outside MRI left shoulder including review of images as well as radiology report indicates high-grade near full-thickness tearing and attenuation of  supraspinatus and infraspinatus with the majority of the tendon appeared to have been retracted to the level of the glenoid, there is a minimal margin of bursal sided superficial layer that does still appear to be attached with moderate glenohumeral joint space narrowing and degenerative change and effusion appreciated.  Significant biceps tendinopathy noted.      Assessment:        1. Shoulder weakness    2. Tendinitis of left rotator cuff    3. Nontraumatic incomplete tear of left rotator cuff    4. Biceps tendinitis of left upper extremity    5. Primary localized osteoarthrosis of left shoulder region           Plan:  Large Joint Arthrocentesis: L glenohumeral  Date/Time: 3/24/2021 10:30 AM  Consent given by: patient  Site marked: site marked  Timeout: Immediately prior to procedure a time out was called to verify the correct patient, procedure, equipment, support staff and site/side marked as required   Supporting Documentation  Indications: pain   Procedure Details  Location: shoulder - L glenohumeral  Preparation: Patient was prepped and draped in the usual sterile fashion  Needle size: 22 G  Approach: anterior  Medications administered: 80 mg triamcinolone acetonide 40 MG/ML; 4 mL lidocaine PF 1% 1 %  Patient tolerance: patient tolerated the procedure well with no immediate complications                1. Discussed treatment options at length with patient at today's visit.  Reviewed with patient results from her imaging as well as her physical exam which does indicate the majority of her pain is coming from her rotator cuff pathology but also from the fact that she does have moderate degenerative change of the glenohumeral joint likely secondary to some early rotator cuff arthropathy.  I reviewed with her that any attempt at arthroscopic treatment and revision rotator cuff repair would likely yield only mild improvement with high chance of failure of repair given the longstanding retraction of the majority of  the rotator cuff tendons.  This would also not address her underlying degenerative change of the glenohumeral joint.  We also discussed option for reverse shoulder arthroplasty but she is not interested in replacement surgery at this time.  2. After viewing other options patient did wish to proceed with injection today.  3. Patient would like to proceed with cortisone injection today to the left glenohumeral joint. Recommended limited use of affected extremity for the next 24 hours to only essential activites other than work on general active and passive motion. Recommended supplementing with ice and soft tissue massage. Discussed with patient that they should see results in 5-7 days, if no improvement in 5-6 weeks I have asked them to call the office to review other options. Patient should call office immediately if they notice redness, warmth, fevers, chills, or residual numbness or tingling for greater than 6 hours after injection.   4. Follow-up in 3 months for reassessment      Lisa Gavin was in agreement with plan and had all questions answered.     Orders:  Orders Placed This Encounter   Procedures   • Large Joint Arthrocentesis: L glenohumeral       Medications:  No orders of the defined types were placed in this encounter.      Followup:  No follow-ups on file.    Diagnoses and all orders for this visit:    1. Shoulder weakness (Primary)    2. Tendinitis of left rotator cuff    3. Nontraumatic incomplete tear of left rotator cuff    4. Biceps tendinitis of left upper extremity    5. Primary localized osteoarthrosis of left shoulder region    Other orders  -     Large Joint Arthrocentesis: L glenohumeral          Dictated utilizing Dragon dictation

## 2021-04-09 ENCOUNTER — OFFICE VISIT (OUTPATIENT)
Dept: SLEEP MEDICINE | Facility: HOSPITAL | Age: 71
End: 2021-04-09

## 2021-04-09 VITALS
WEIGHT: 218 LBS | BODY MASS INDEX: 38.62 KG/M2 | HEART RATE: 68 BPM | DIASTOLIC BLOOD PRESSURE: 78 MMHG | SYSTOLIC BLOOD PRESSURE: 142 MMHG | HEIGHT: 63 IN

## 2021-04-09 DIAGNOSIS — G47.33 OSA (OBSTRUCTIVE SLEEP APNEA): Primary | ICD-10-CM

## 2021-04-09 DIAGNOSIS — R06.83 SNORING: ICD-10-CM

## 2021-04-09 DIAGNOSIS — E66.9 CLASS 2 OBESITY: ICD-10-CM

## 2021-04-09 DIAGNOSIS — G47.10 HYPERSOMNIA: ICD-10-CM

## 2021-04-09 PROBLEM — E66.812 CLASS 2 OBESITY: Status: ACTIVE | Noted: 2021-04-09

## 2021-04-09 PROCEDURE — G0463 HOSPITAL OUTPT CLINIC VISIT: HCPCS

## 2021-04-09 PROCEDURE — 99204 OFFICE O/P NEW MOD 45 MIN: CPT | Performed by: INTERNAL MEDICINE

## 2021-04-09 NOTE — PROGRESS NOTES
[facility]  [address]  [address]  [place] [address]  Phone   Fax       Lisa Gavin  1950  70 y.o.  female      Referring physician/provider and PCP Edith Chávez PA    Type of service: Initial Sleep Medicine Consult / New Patient Office Visit Sleep Medicine.  Date of service: 4/9/2021      Chief Complaint   Patient presents with   • Witnessed Apnea   • Snoring   • Fatigue   • Daytime Sleepiness       History of present illness;  Thank you for asking to see Lisa Gavin, 70 y.o.. The patient was seen today on 4/9/2021 at [facility].  The patient presents today with symptoms of snoring, nonrestorative sleep and witnessed apneas.  Patient reports that she was diagnosed with sleep apnea about 20 years ago and had a sleep test at [facility].  She was given a CPAP and she is been using it but unable to get any supplies.  Now the machine is very old and unable to use it and now her symptoms are getting worse.  She has snoring in all positions and it is loud and progressively getting worse.  She also has witnessed apneas and daytime excessive sleepiness with [place] Sleepiness Scale of 16.    Patient gives the following sleep history.  Sleep schedule:  Bedtime: 10 PM  Wake time: 5 AM  Normally takes about 30-60 minutes to fall asleep  Average hours of sleep 6-7  Number of naps per day none  Symptoms  In addition to snoring, nonrestorative sleep and witnessed apneas patient gives the following associated symptoms.  Have you ever awakened gasping for breath, coughing, choking: Yes   Change in weight,  No   Morning headaches  Yes   Awaken with a sore throat or dry mouth  Yes   Leg jerking at night:  No   Crawly feeling/urge sensation to move in the legs: No   Teeth grinding:No   Have you ever awakened at night with a sour taste or burning sensation in your chest:  Yes   Do you have muscle weakness with laughing or anger or  sleep paralysis:  no  Have you ever felt paralyzed while going to sleep or waking up:  No   Sleepwalking, nightmares, No   Nocturia (urination at night): 2 times per night  Memory Problem:No   Past Medical History:   Diagnosis Date   • A-fib (CMS/[facility])    • Arthritis    • Depression    • GERD (gastroesophageal reflux disease)    • Headache    • Sleep apnea    • Type 2 diabetes mellitus (CMS/[facility])        Past Surgical history   has a past surgical history that includes Cholecystectomy; Hysterectomy; Rotator cuff repair; and Shoulder surgery (Bilateral).     Social history:  Do you drive a commercial vehicle:  No   Shift work:  No   Tobacco use:  No   Alcohol use:  No   Caffeinated drinks: 2 sodas  Over-the-counter sleeping aid:  No   Narcotic medications: No     Family Hx  In addition to the family history below, is any of your family members have the following,  1. Sleep apnea: No   2. Narcolepsy: No   3. Parkinson's disease: Yes Father has Parkinson's disease  4. Thyroid disorder: No   5. RLS (restless leg syndrome): No   6. Periodic limb movements: No   7. Obesity: No   8. Insomnia: No   9. Sleep walking/Sleep terrors:  No     Medications: reviewed    Current Outpatient Medications:   •  atorvastatin (LIPITOR) 20 MG tablet, , Disp: , Rfl:   •  citalopram (CeleXA) 20 MG tablet, Take 2 tablets by mouth 2 (two) times a day., Disp: , Rfl:   •  furosemide (LASIX) 20 MG tablet, , Disp: , Rfl:   •  glyBURIDE-metFORMIN (GLUCOVANCE) 5-500 MG per tablet, Take 2 tablets by mouth 2 (two) times a day., Disp: , Rfl:   •  Insulin Glargine 100 UNIT/ML solution pen-injector, Inject 58 Units under the skin into the appropriate area as directed 2 (Two) Times a Day., Disp: , Rfl:   •  methIMAzole (TAPAZOLE) 5 MG tablet, TAKE ONE TABLET BY MOUTH DAILY, Disp: 30 tablet, Rfl: 0  •  metoprolol tartrate (LOPRESSOR) 50 MG tablet, , Disp: , Rfl:   •  naproxen (NAPROSYN) 500 MG tablet, Take 500 mg by mouth 2 (Two) Times a Day., Disp: , Rfl:  "  •  omeprazole (priLOSEC) 40 MG capsule, Take 40 mg by mouth Daily., Disp: , Rfl:   •  rivaroxaban (XARELTO) tablet, Take 1 tablet by mouth daily., Disp: , Rfl:   •  vitamin E 400 UNIT capsule, Take 400 Units by mouth Daily., Disp: , Rfl:     Review of systems:  [place] Sleepiness Scale: Total score: 16   Review of Systems   Constitutional: Positive for fatigue. Negative for unexpected weight change.   HENT: Negative for congestion, nosebleeds and postnasal drip.    Eyes: Negative for pain, discharge and itching.   Respiratory: Positive for apnea and choking.    Cardiovascular: Positive for palpitations and leg swelling.   Gastrointestinal: Negative for nausea and vomiting.   Endocrine: Negative for cold intolerance and heat intolerance.   Genitourinary: Negative for difficulty urinating and frequency.   Musculoskeletal: Negative for neck pain and neck stiffness.   Skin: Negative for color change, pallor and rash.   Neurological: Positive for headaches. Negative for dizziness and seizures.   Hematological: Negative for adenopathy. Does not bruise/bleed easily.   Psychiatric/Behavioral: Positive for sleep disturbance. Negative for agitation, behavioral problems and self-injury.        Physical exam:  CONSTITUTINONAL:  Vitals:    04/09/21 1100   BP: 142/78   Pulse: 68   Weight: 98.9 kg (218 lb)   Height: 160 cm (63\")    Body mass index is 38.62 kg/m².   HEAD: atraumatic, normocephalic   EYES:pupils are round, equal and reacting to light and accommodation, conjunctiva normal  NOSE:no nasal septal defects or deviation and the nasal passages are clear, no nasal polyps,   THOART: tonsils are not enlarged, tongue normal size, oral airway Mallampati class 3  NECK:Neck Circumference: 17 inches, trachea is in the midline, thyroid not enlarged  RESPIRATORY SYSTEM: Breath sounds are normal, there are no wheezes, no crackles  CARDIOVASULAR SYSTEM: Heart sounds are regular rhythm and normal rate, there are no murmurs or thrills, " no edema   GASTROINTESTIAN: Soft and non-tender,liver not enlarged, no evidence of ascites  MUSCULOSKELETAL SYSTEM: Full range of motion's in all the 4 extremities, neck movement not restricted, temporomandibular joint movement normal and no tenderness  SKIN: Warm and moist, no cyanosis, no clubbing,  NEUROLOGICAL SYSTEM: Oriented x 3, no gross neurological defects, No speech defect, gait is normal  PYSCHATRIC SYSTEM: Mood is normal, thought content is normal    Office notes from care team reviewed. Office note dated 3/5/2021 were reviewed.  From [facility]      Assessment and plan:  · Sleep apnea obstructive, (G47.33) sleep study was 20 years ago and her CPAP is also 20 years old.  As the patient is unable to use the CPAP her symptoms are suggestive of sleep apnea.  I again talked to her about sleep apnea and the consequences of untreated sleep apnea.  Also discussed the pathophysiology of sleep apnea.  She needs a reevaluation..  Based on history and physical examination the most appropriate study is home sleep test.  The order for the sleep study is placed in [facility].  The test will be scheduled after approval from insurance. Treatment and management will be discussed after the test is completed.  Patient was given opportunity to ask questions and all the questions were answered.   · Obesity class 2, patient's BMI is Body mass index is 38.62 kg/m².. I have discussed the relationship between weight and sleep apnea.There is direct correction between weight and severity of sleep apnea.  Weight reduction is encouraged. I have also discussed with the patient diet and exercise..  · Snoring, snoring is the sound created by turbulent airflow vibrating upper airway soft tissue.  I have also discussed factors affecting snoring including sleep deprivation, sleeping on the back and alcohol ingestion. To minimize snoring, patient is advised to have adequate sleep, sleep on the side and avoid alcohol and sedative  medications before bedtime  · Hypersomnia, daytime excessive sleepiness was assessed with [place] Sleepiness Scale of Total score: 16.  There are many causes for daytime excessive sleepiness including sleep depression, shiftwork syndrome, depression and other medical disorders including heart, kidney and liver failure.  The most serious cause of hypersomnia is due to neurological conditions like narcolepsy/cataplexy.  But the most common cause of hypersomnia is due to sleep apnea with frequent awakenings during sleep time.  I have discussed safety of driving and to remain vigilant while driving.    I have also discussed with the patient the following  • Sleep hygiene: Maintaining a regular bedtime and wake time, not to watch television or work in bed, limit caffeine-containing beverages before bed time and avoid naps during the day  • Adequate amount of sleep.  Generally most people needs about 7 to 8 hours of sleep.        I once again thank you for asking me to see this patient in consultation and I have forwarded my opinion and treatment plan.  Please do not hesitate to call me if you have any questions.     Chava Koenig MD  Sleep Medicine  Medical Director, [facility]  4/9/2021 ,

## 2021-04-12 ENCOUNTER — HOSPITAL ENCOUNTER (OUTPATIENT)
Dept: SLEEP MEDICINE | Facility: HOSPITAL | Age: 71
Discharge: HOME | End: 2021-04-12
Admitting: INTERNAL MEDICINE

## 2021-04-12 DIAGNOSIS — G47.33 OSA (OBSTRUCTIVE SLEEP APNEA): ICD-10-CM

## 2021-04-12 DIAGNOSIS — E66.9 CLASS 2 OBESITY: ICD-10-CM

## 2021-04-12 DIAGNOSIS — G47.10 HYPERSOMNIA: ICD-10-CM

## 2021-04-12 DIAGNOSIS — R06.83 SNORING: ICD-10-CM

## 2021-04-12 PROCEDURE — 95806 SLEEP STUDY UNATT&RESP EFFT: CPT

## 2021-04-12 PROCEDURE — 95806 SLEEP STUDY UNATT&RESP EFFT: CPT | Performed by: INTERNAL MEDICINE

## 2021-04-26 ENCOUNTER — TELEPHONE (OUTPATIENT)
Dept: SLEEP MEDICINE | Facility: HOSPITAL | Age: 71
End: 2021-04-26

## 2021-05-06 ENCOUNTER — OFFICE VISIT (OUTPATIENT)
Dept: ORTHOPEDIC SURGERY | Facility: CLINIC | Age: 71
End: 2021-05-06

## 2021-05-06 VITALS — WEIGHT: 218 LBS | BODY MASS INDEX: 38.62 KG/M2 | HEIGHT: 63 IN

## 2021-05-06 DIAGNOSIS — M17.12 PRIMARY OSTEOARTHRITIS OF LEFT KNEE: Primary | ICD-10-CM

## 2021-05-06 PROCEDURE — 20610 DRAIN/INJ JOINT/BURSA W/O US: CPT | Performed by: ORTHOPAEDIC SURGERY

## 2021-05-06 PROCEDURE — 99213 OFFICE O/P EST LOW 20 MIN: CPT | Performed by: ORTHOPAEDIC SURGERY

## 2021-05-06 RX ORDER — FUROSEMIDE 40 MG/1
40 TABLET ORAL DAILY
COMMUNITY
Start: 2021-05-04 | End: 2022-10-20

## 2021-05-06 RX ADMIN — TRIAMCINOLONE ACETONIDE 80 MG: 40 INJECTION, SUSPENSION INTRA-ARTICULAR; INTRAMUSCULAR at 08:37

## 2021-05-06 RX ADMIN — LIDOCAINE HYDROCHLORIDE 8 ML: 10 INJECTION, SOLUTION EPIDURAL; INFILTRATION; INTRACAUDAL; PERINEURAL at 08:37

## 2021-05-07 ENCOUNTER — OFFICE VISIT (OUTPATIENT)
Dept: SLEEP MEDICINE | Facility: HOSPITAL | Age: 71
End: 2021-05-07

## 2021-05-07 VITALS
HEIGHT: 63 IN | DIASTOLIC BLOOD PRESSURE: 77 MMHG | HEART RATE: 69 BPM | SYSTOLIC BLOOD PRESSURE: 139 MMHG | BODY MASS INDEX: 39.34 KG/M2 | WEIGHT: 222 LBS

## 2021-05-07 DIAGNOSIS — G47.10 HYPERSOMNIA: ICD-10-CM

## 2021-05-07 DIAGNOSIS — G47.33 OSA (OBSTRUCTIVE SLEEP APNEA): Primary | ICD-10-CM

## 2021-05-07 DIAGNOSIS — R06.83 SNORING: ICD-10-CM

## 2021-05-07 PROCEDURE — G0463 HOSPITAL OUTPT CLINIC VISIT: HCPCS

## 2021-05-07 PROCEDURE — 99213 OFFICE O/P EST LOW 20 MIN: CPT | Performed by: INTERNAL MEDICINE

## 2021-05-07 NOTE — PROGRESS NOTES
"  [facility]  [address]  [address]  [place] [address] [address]  Phone   Fax       SLEEP CLINIC FOLLOW UP PROGRESS NOTE.    Lisa Gavin  1950  70 y.o.  female      PCP: Edith Chávez PA      Date of visit: 5/7/2021    Chief Complaint   Patient presents with   • Sleep Apnea   • Obesity       HPI:  This is a 70 y.o. years old patient who has a history of obstructive sleep apnea for the past 20 years.  Recently she underwent a home sleep test to requalify for CPAP.  Patient reports that without the CPAP she feels miserable in the daytime.  I have reviewed her home sleep test with her which shows mild sleep apnea with AHI of 5.5/h but that she has significant snoring for 35% of her sleep time.  Patient because of her symptoms she is willing to use the CPAP.  Bedtime 9 PM  Wake time 5 AM  Without the CPAP patient feels tired and exhausted    Mediactions and allergies are reviewed by me and documented in the encounter.     SOCIAL ( habits pertaining to sleep medicine)  History of smoking:No   History of alcohol use: 0 per week  Caffeine use: 3 cans of soda    REVIEW OF SYSTEMS:   [place] Sleepiness Scale :Total score: 13   Nsaal congestion:No   Dry mouth/nose:No   Post nasal drip; No   Acid reflux/Heartburn:No   Snoring: yes    PHYSICAL EXAMINATION:  CONSTITUTIONAL:  Vitals:    05/07/21 1100   BP: 139/77   Pulse: 69   Weight: 101 kg (222 lb)   Height: 160 cm (63\")    Body mass index is 39.33 kg/m².     NOSE: nasal passages are clear, no nasal polyps, septum in the midline.  THROAT: throat is clear, oral airway Mallampati class 3  RESP SYSTEM: Breath sounds are normal, no wheezes or crackles  CARDIOVASULAR: Heart rate is regular without murmur. No edema      I have reviewed the home sleep test with the patient.    ASSESSMENT AND PLAN:  · Obstructive sleep apnea ( G 47.33).  Arrange auto CPAP and see me back in about 6 to 8 weeks for compliance check.  Patient has " persistent symptoms of snoring and daytime excessive sleepiness and fatigue and using the CPAP would benefit the patient and also treat his sleep apnea.  · Obesity, class 2 with BMI is Body mass index is 39.33 kg/m².. I have discuss the relationship between the weight and sleep apnea. The benefit of weight loss in reducing severity of sleep apnea was discussed. Discussed diet and exercise with the patient to archive ideal BMI.  · Return for 31 to 90 days after PAP setup with down load. . Patient's questions were answered.        Chava Koenig MD  Sleep Medicine  Medical Director for [facility]  5/7/2021

## 2021-05-10 RX ORDER — LIDOCAINE HYDROCHLORIDE 10 MG/ML
8 INJECTION, SOLUTION EPIDURAL; INFILTRATION; INTRACAUDAL; PERINEURAL
Status: COMPLETED | OUTPATIENT
Start: 2021-05-06 | End: 2021-05-06

## 2021-05-10 RX ORDER — TRIAMCINOLONE ACETONIDE 40 MG/ML
80 INJECTION, SUSPENSION INTRA-ARTICULAR; INTRAMUSCULAR
Status: COMPLETED | OUTPATIENT
Start: 2021-05-06 | End: 2021-05-06

## 2021-06-22 ENCOUNTER — OFFICE VISIT (OUTPATIENT)
Dept: ORTHOPEDIC SURGERY | Facility: CLINIC | Age: 71
End: 2021-06-22

## 2021-06-22 VITALS — WEIGHT: 222 LBS | BODY MASS INDEX: 39.34 KG/M2 | HEIGHT: 63 IN

## 2021-06-22 DIAGNOSIS — M75.22 BICEPS TENDINITIS OF LEFT UPPER EXTREMITY: ICD-10-CM

## 2021-06-22 DIAGNOSIS — M19.012 PRIMARY LOCALIZED OSTEOARTHROSIS OF LEFT SHOULDER REGION: ICD-10-CM

## 2021-06-22 DIAGNOSIS — M17.12 PRIMARY OSTEOARTHRITIS OF LEFT KNEE: Primary | ICD-10-CM

## 2021-06-22 DIAGNOSIS — M75.112 NONTRAUMATIC INCOMPLETE TEAR OF LEFT ROTATOR CUFF: ICD-10-CM

## 2021-06-22 PROCEDURE — 99212 OFFICE O/P EST SF 10 MIN: CPT | Performed by: ORTHOPAEDIC SURGERY

## 2021-06-22 NOTE — PROGRESS NOTES
"Subjective:     Patient ID: Lisa Gavin is a 70 y.o. female.    Chief Complaint: Left shoulder pain, follow-up  Last injection left glenohumeral 3/24/2021    History of Present Illness  Lisa Gavin returns to clinic today for evaluation of left shoulder pain following cortisone injection. The injection provided 70% relief persisting through today's visit. She reports she is overall doing well at this time. The pain is localized to the anterior posterior glenohumeral joint line without radiation. Her pain is rated 4/10 in severity today and is aching in quality.  The pain is aggravated by overuse and activity but alleviated by rest and cortisone injections. Denies swelling, tingling, or numbness.     Social History     Occupational History   • Not on file   Tobacco Use   • Smoking status: Never Smoker   • Smokeless tobacco: Never Used   Vaping Use   • Vaping Use: Never used   Substance and Sexual Activity   • Alcohol use: No   • Drug use: No   • Sexual activity: Defer      Past Medical History:   Diagnosis Date   • A-fib (CMS/[facility])    • Arthritis    • Depression    • GERD (gastroesophageal reflux disease)    • Headache    • Sleep apnea    • Type 2 diabetes mellitus (CMS/[facility])      Past Surgical History:   Procedure Laterality Date   • CHOLECYSTECTOMY     • HYSTERECTOMY     • ROTATOR CUFF REPAIR      ACUTE   • SHOULDER SURGERY Bilateral     hAS HAD ROTATOR CUFF SURGERY ON BOTH SHOULDERS       Family History   Problem Relation Age of Onset   • Cancer Other    • Diabetes Other    • Glaucoma Other    • Rheum arthritis Other    • Kidney disease Other          Review of Systems        Objective:  Vitals:    06/22/21 1551   Weight: 101 kg (222 lb)   Height: 160 cm (63\")         06/22/21  1551   Weight: 101 kg (222 lb)     Body mass index is 39.33 kg/m².  General: No acute distress.  Resp: normal respiratory effort  Skin: no rashes or wounds; normal turgor  Psych: mood and affect appropriate; recent and remote memory " intact          Ortho Exam     left shoulder-  Tenderness  located anterior, posterior joint line  FF-   Active- 160    Strength- 4-/5  ER-      Active- 35   Strength- 4/5  IR         Strength- 4+/5 on belly press test    Empty can test- positive    Neer's sign- positive  Llanos- positive    Brisk cap refill to all digits, 2+ palpable radial pulse    Positive sensation to light touch palmar, dorsal aspects of small and index fingers and anatomic snuffbox left hand        Imaging:  None today    Assessment:        1. Primary osteoarthritis of left knee    2. Nontraumatic incomplete tear of left rotator cuff    3. Primary localized osteoarthrosis of left shoulder region    4. Biceps tendinitis of left upper extremity           Plan:          1. Discussed treatment options at length with patient at today's visit including continued activity management or cortisone injections. Given her low severity of symptoms, we will proceed with activity management and not perform an additional injection at today's visit.   2. Follow-up as needed.       Lisa Gavin was in agreement with plan and had all questions answered.     Orders:  No orders of the defined types were placed in this encounter.      Medications:  No orders of the defined types were placed in this encounter.      Followup:  Return if symptoms worsen or fail to improve.    Diagnoses and all orders for this visit:    1. Primary osteoarthritis of left knee (Primary)    2. Nontraumatic incomplete tear of left rotator cuff    3. Primary localized osteoarthrosis of left shoulder region    4. Biceps tendinitis of left upper extremity        SCRIBE ATTESTATION:  Pacheco WEBER, attest that all medical record entries for this patient were documented by me acting as a medical scribe for Sheldon Reid MD.    PROVIDER ATTESTATION:  Sheldon WEBER MD, personally performed the services described in this documentation. All medical record entries made by the  scribe were at my direction and in my presence. I have reviewed the chart and discharge instructions and agree that the record reflects my personal performance and is accurate and complete.  Sheldon Reid MD.    Electronically signed: Sheldon Reid MD 6/22/2021 17:45 EDT       Dictated utilizing Dragon dictation

## 2021-06-25 ENCOUNTER — OFFICE VISIT (OUTPATIENT)
Dept: SLEEP MEDICINE | Facility: HOSPITAL | Age: 71
End: 2021-06-25

## 2021-06-25 VITALS
HEART RATE: 68 BPM | WEIGHT: 221 LBS | HEIGHT: 63 IN | SYSTOLIC BLOOD PRESSURE: 151 MMHG | BODY MASS INDEX: 39.16 KG/M2 | DIASTOLIC BLOOD PRESSURE: 70 MMHG

## 2021-06-25 DIAGNOSIS — E66.9 CLASS 2 OBESITY: ICD-10-CM

## 2021-06-25 DIAGNOSIS — G47.33 OSA ON CPAP: Primary | ICD-10-CM

## 2021-06-25 DIAGNOSIS — Z99.89 OSA ON CPAP: Primary | ICD-10-CM

## 2021-06-25 PROBLEM — G47.10 HYPERSOMNIA: Status: RESOLVED | Noted: 2021-04-09 | Resolved: 2021-06-25

## 2021-06-25 PROBLEM — R06.83 SNORING: Status: RESOLVED | Noted: 2021-04-09 | Resolved: 2021-06-25

## 2021-06-25 PROCEDURE — 99213 OFFICE O/P EST LOW 20 MIN: CPT | Performed by: INTERNAL MEDICINE

## 2021-06-25 PROCEDURE — G0463 HOSPITAL OUTPT CLINIC VISIT: HCPCS

## 2021-06-25 NOTE — PROGRESS NOTES
"  [facility]  [address] [address]  [place] [address]  Phone   Fax       SLEEP CLINIC FOLLOW UP PROGRESS NOTE.    Lisa Gavin  1950  70 y.o.  female      PCP: Edith Chávez PA      Date of visit: 6/25/2021    Chief Complaint   Patient presents with   • Sleep Apnea   • Obesity       HPI:  This is a 70 y.o. years old patient who has a history of obstructive sleep apnea is here for  the initial compliance follow-up.  Sleep apnea is mild in severity.  Patient is using positive airway pressure therapy with auto CPAP and the symptoms of snoring, non-restorative sleep and daytime excessive sleepiness have improved significantly on the therapy. Normally goes to bed at 9 PM and wakes up at 5 AM.  The patient wakes up 2 time(s) during the night and has no problem going back to sleep.  Feels refreshed after waking up.  Patient also denies headaches and nasal congestion.       Mediactions and allergies are reviewed by me and documented in the encounter.     SOCIAL ( habits pertaining to sleep medicine)  History of smoking:No   History of alcohol use: 0 per week  Caffeine use: 2     REVIEW OF SYSTEMS:   [place] Sleepiness Scale :Total score: 8   Nsaal congestion:No   Dry mouth/nose:Yes   Post nasal drip; No   Acid reflux/Heartburn:Yes   Abd bloating:No   Morining headache:No   Anxiety:No   Depression:No    PHYSICAL EXAMINATION:  CONSTITUTIONAL:  Vitals:    06/25/21 1100   BP: 151/70   Pulse: 68   Weight: 100 kg (221 lb)   Height: 160 cm (63\")    Body mass index is 39.15 kg/m².   NOSE: nasal passages are clear, no nasal polyps, septum in the midline.  THROAT: throat is clear, oral airway Mallampati class 3  RESP SYSTEM: Breath sounds are normal, no wheezes or crackles  CARDIOVASULAR: Heart rate is regular without murmur. No edema      Data reviewed:  The Smart card downloaded on 6/25/2021 has been reviewed independently by me for compliance and discussed the " data with the patient.   Compliance; 100%  More than 4 hr use, 100%  Average use of the device 8 hours per night  Residual AHI: 3.1 /hr (goal < 5.0 /hr)  Mask type: Full facemask  DME: [employer]      ASSESSMENT AND PLAN:  · Obstructive sleep apnea ( G 47.33).  The symptoms of sleep apnea have improved with the device and the treatment.  Patient's compliance with the device is excellent for treatment of sleep apnea.  I have independently reviewed the smart card down load and discussed with the patient the download data and encouarged the patient to continue to use the device.The residual AHI is acceptable. The device is benefiting the patient and the device is medically necessary.  Without proper control of sleep apnea and good compliance there is a increased risk for hypertension, diabetes mellitus and nonrestorative sleep with hypersomnia which can increase risk for motor vehicle accidents.  Untreated sleep apnea is also a risk factor for development of atrial fibrillation, pulmonary hypertension and stroke. The patient is also instructed to get the supplies from the DME company and and change them on a regular basis.  A prescription for supplies has been sent to the [employer] company.  I have also discussed the good sleep hygiene habits and adequate amount of sleep needed for good health.  · Obesity, class 2 with BMI is Body mass index is 39.15 kg/m².. I have discuss the relationship between the weight and sleep apnea. The benefit of weight loss in reducing severity of sleep apnea was discussed. Discussed diet and exercise with the patient to archive ideal BMI.  · Return in about 1 year (around 6/25/2022) for Annual visit with smartcard download. . Patient's questions were answered.        Chava Koenig MD  Sleep Medicine  Medical Director for [facility]  6/25/2021

## 2021-08-13 ENCOUNTER — OFFICE VISIT (OUTPATIENT)
Dept: ORTHOPEDIC SURGERY | Facility: CLINIC | Age: 71
End: 2021-08-13

## 2021-08-13 VITALS
DIASTOLIC BLOOD PRESSURE: 77 MMHG | SYSTOLIC BLOOD PRESSURE: 118 MMHG | HEIGHT: 63 IN | WEIGHT: 220 LBS | RESPIRATION RATE: 16 BRPM | BODY MASS INDEX: 38.98 KG/M2 | HEART RATE: 76 BPM

## 2021-08-13 DIAGNOSIS — M17.12 PRIMARY OSTEOARTHRITIS OF LEFT KNEE: Primary | ICD-10-CM

## 2021-08-13 PROCEDURE — 99214 OFFICE O/P EST MOD 30 MIN: CPT | Performed by: NURSE PRACTITIONER

## 2021-08-13 PROCEDURE — 20610 DRAIN/INJ JOINT/BURSA W/O US: CPT | Performed by: NURSE PRACTITIONER

## 2021-08-13 RX ADMIN — LIDOCAINE HYDROCHLORIDE 8 ML: 10 INJECTION, SOLUTION EPIDURAL; INFILTRATION; INTRACAUDAL; PERINEURAL at 14:33

## 2021-08-13 RX ADMIN — TRIAMCINOLONE ACETONIDE 80 MG: 40 INJECTION, SUSPENSION INTRA-ARTICULAR; INTRAMUSCULAR at 14:33

## 2021-08-13 NOTE — PROGRESS NOTES
Subjective:     Patient ID: Lisa Gavin is a 70 y.o. female.    Chief Complaint:  Left knee pain, new patient to examiner  History of Present Illness  Lisa Gavin 70-year-old female presents to clinic for evaluation of her left knee.  Worsening pain over the last 3 weeks along with some difficult swelling to the left lower extremity.  Maximal tenderness at the anterior as well as the posterior aspect of the knee, pain also present at the medial compartment.  Increased pain noted with activities involving deep flexion, attempting to extend the left knee is also a very uncomfortable.  Denies known specific injury pain completely resolved with prior corticosteroid injection however only lasted for about 8 to 9 weeks and symptoms return.  Denies that she is ready to proceed with any total knee replacement surgery.  Rates discomfort as 7-8 out of a 10 increased pain noted with extension and flexion of the knee and weightbearing activities as increased approximately 8 out of 10.  Last corticosteroid injection received left knee 5/6/2021.  Continues to deny presence of any numbness or tingling at the left lower extremity denies the pain is radiating to the groin.  Denies other concerns present time.     Social History     Occupational History   • Not on file   Tobacco Use   • Smoking status: Never Smoker   • Smokeless tobacco: Never Used   Vaping Use   • Vaping Use: Never used   Substance and Sexual Activity   • Alcohol use: No   • Drug use: No   • Sexual activity: Defer      Past Medical History:   Diagnosis Date   • A-fib (CMS/[facility])    • Arthritis    • Depression    • GERD (gastroesophageal reflux disease)    • Headache    • Sleep apnea    • Type 2 diabetes mellitus (CMS/[facility])      Past Surgical History:   Procedure Laterality Date   • CHOLECYSTECTOMY     • HYSTERECTOMY     • ROTATOR CUFF REPAIR      ACUTE   • SHOULDER SURGERY Bilateral     hAS HAD ROTATOR CUFF SURGERY ON BOTH SHOULDERS       Family History  "  Problem Relation Age of Onset   • Cancer Other    • Diabetes Other    • Glaucoma Other    • Rheum arthritis Other    • Kidney disease Other        Objective:  Physical Exam    Vital signs reviewed.   General: No acute distress.  Eyes: conjunctiva clear; pupils equally round and reactive  ENT: external ears and nose atraumatic; oropharynx clear  CV: no peripheral edema  Resp: normal respiratory effort  Skin: no rashes or wounds; normal turgor  Psych: mood and affect appropriate; recent and remote memory intact    Vitals:    08/13/21 1412   BP: 118/77   Pulse: 76   Resp: 16   Weight: 99.8 kg (220 lb)   Height: 160 cm (63\")         08/13/21  1412   Weight: 99.8 kg (220 lb)     Body mass index is 38.97 kg/m².      Left Knee Exam     Tenderness   The patient is experiencing tenderness in the medial joint line and patella.    Range of Motion   Extension: 5   Left knee flexion: 85.     Tests   Varus: negative Valgus: negative  Lachman:  Anterior - 1+      Patellar apprehension: positive    Other   Erythema: absent  Sensation: normal  Pulse: present  Swelling: moderate    Comments:  Positive active patellar compression test  Positive tenderness lateral patellar facet  Positive crepitus arc of motion                 Imaging:  Independently reviewed three-view x-ray imaging previously completed [facility] lag 10/1/2020 advanced medial compartment narrowing with bone-on-bone articulation, osteophytes in all 3 compartments, moderately advanced osteoarthritis at the patellofemoral joints again osteophytes present  Assessment:        1. Primary osteoarthritis of left knee           Plan:  1.  Discussed plan of care with patient.  Discussed treatment options including corticosteroid injection viscosupplementation injections.  Given the swelling of the pain the improvement she is received with corticosteroid injections wishes to proceed with corticosteroid injection to the left knee.  Discussed application of ice watch for any erythema, " range of motion as tolerated.  Discussed may take up to 1 week before symptom relief is achieved continue with application of ice at the posterior aspect as well as the incision.  Ace wrap provided can use for swelling.  Discussed if she is not improving or if symptoms seem to return in the next 6 to 8 weeks I do recommend presenting to clinic can try viscosupplementation injections to see if they provide her with longer lasting relief.  If she chooses to cancel she can always cancel the appointment if the pain is well controlled.  She verbalized understanding of all information agrees with plan of care.  Denies other concerns present this time.  Large Joint Arthrocentesis: L knee  Date/Time: 8/13/2021 2:33 PM  Consent given by: patient  Site marked: site marked  Timeout: Immediately prior to procedure a time out was called to verify the correct patient, procedure, equipment, support staff and site/side marked as required   Supporting Documentation  Indications: pain   Procedure Details  Location: knee - L knee  Preparation: Patient was prepped and draped in the usual sterile fashion  Needle size: 22 G  Approach: superior (lateral)  Medications administered: 80 mg triamcinolone acetonide 40 MG/ML; 8 mL lidocaine PF 1% 1 %  Patient tolerance: patient tolerated the procedure well with no immediate complications          Orders:  Orders Placed This Encounter   Procedures   • Large Joint Arthrocentesis: L knee       Medications:  No orders of the defined types were placed in this encounter.      Followup:  No follow-ups on file.    Diagnoses and all orders for this visit:    1. Primary osteoarthritis of left knee (Primary)  -     Large Joint Arthrocentesis: L knee          I ordered and reviewed the FOSTER today.     Dictated utilizing Dragon dictation

## 2021-08-14 RX ORDER — LIDOCAINE HYDROCHLORIDE 10 MG/ML
8 INJECTION, SOLUTION EPIDURAL; INFILTRATION; INTRACAUDAL; PERINEURAL
Status: COMPLETED | OUTPATIENT
Start: 2021-08-13 | End: 2021-08-13

## 2021-08-14 RX ORDER — TRIAMCINOLONE ACETONIDE 40 MG/ML
80 INJECTION, SUSPENSION INTRA-ARTICULAR; INTRAMUSCULAR
Status: COMPLETED | OUTPATIENT
Start: 2021-08-13 | End: 2021-08-13

## 2021-09-08 NOTE — PROGRESS NOTES
Otf Gavin is a 70 y.o. female.     New pt dref by William PAL for hyperthyroidism, dm 2/ testing bs 1 x day / last dm eye exam 1 yr ago      Patient is a 70-year-old female who came in for thyroid evaluation and diabetes control.    She was found to be hyperthyroid in 2015 by Dr. Landry.  She was started on antithyroid medication and is on methimazole 5 mg once a day.  She has intentionally lost 25 pounds in the past 6 months.  She denies tremors.  She denies heat or cold intolerance.  She denies bowel changes.    She has hypertension and paroxysmal atrial fibrillation.  She has no history of heart attack back or stroke.  She denies chest pain or palpitations.  She is on metoprolol tartrate 50 mg twice a day, furosemide 20 mg once a day and Xarelto 20 mg once a day.  She follows with Dr. Ramos.    She has known diabetes mellitus since 2000 and started on insulin in 2001.  She is on Lantus 60 units every morning, and glyburide/metformin 5/502 tablets twice a day.  She is not used GLP-1 agonist or DPP 4 inhibitors.  She does not check her blood sugars regularly.  She has intermittent hypoglycemia related to variable food intake or activity.  She denies early morning hypoglycemia.  Her last meal was at 7 AM.    Her last eye examination was in 2020.  She has no known retinopathy.  She denies nephropathy.  She denies numbness, tingling or burning in her hands or feet.    She has hyperlipidemia and is on Lipitor 20 mg/day.  She denies myalgia.    She has sleep apnea and uses a CPAP regularly.    She had a total hysterectomy and bilateral salpingo-oophorectomy at age 45 for heavy vaginal bleeding she was not placed on estrogen replacement therapy.  She had a normal bone density several years ago.  She denies alcohol or tobacco abuse.  Her mother had a hip fracture in her 50's.      The following portions of the patient's history were reviewed and updated as appropriate: allergies, current  "medications, past family history, past medical history, past social history, past surgical history and problem list.    Review of Systems   Eyes: Negative for visual disturbance.   Respiratory: Negative for shortness of breath.    Cardiovascular: Negative for chest pain and palpitations.   Gastrointestinal: Negative.    Endocrine: Negative for cold intolerance and heat intolerance.   Genitourinary: Negative.    Musculoskeletal: Negative for myalgias.   Neurological: Negative for tremors and numbness.     Objective      Vitals:    09/22/21 0800   BP: 134/66   BP Location: Right arm   Patient Position: Sitting   Cuff Size: Large Adult   Pulse: 71   SpO2: 97%   Weight: 100 kg (221 lb)   Height: 160 cm (62.99\")     Physical Exam  Constitutional:       General: She is not in acute distress.     Appearance: Normal appearance. She is obese. She is not ill-appearing, toxic-appearing or diaphoretic.   HENT:      Nose: Nose normal.      Mouth/Throat:      Pharynx: No oropharyngeal exudate or posterior oropharyngeal erythema.   Eyes:      General: No scleral icterus.        Right eye: No discharge.         Left eye: No discharge.      Extraocular Movements: Extraocular movements intact.      Conjunctiva/sclera: Conjunctivae normal.   Neck:      Vascular: No carotid bruit.   Cardiovascular:      Rate and Rhythm: Normal rate. Rhythm irregular.      Pulses: Normal pulses.      Heart sounds: Normal heart sounds. No murmur heard.   No friction rub. No gallop.    Pulmonary:      Effort: No respiratory distress.      Breath sounds: Normal breath sounds. No stridor. No wheezing, rhonchi or rales.   Chest:      Chest wall: No tenderness.   Abdominal:      General: Bowel sounds are normal. There is no distension.      Palpations: Abdomen is soft. There is no mass.      Tenderness: There is no right CVA tenderness or left CVA tenderness.   Musculoskeletal:         General: No swelling or tenderness. Normal range of motion.      Cervical " back: Normal range of motion. No rigidity or tenderness.      Comments: No plantar ulcers   Lymphadenopathy:      Cervical: No cervical adenopathy.   Neurological:      Mental Status: She is alert and oriented to person, place, and time.      Comments: Intact light touch in lower extremities       Admission on 10/07/2019, Discharged on 10/10/2019   Component Date Value Ref Range Status   • Glucose 10/08/2019 170* 65 - 99 mg/dL Final   • BUN 10/08/2019 25* 8 - 23 mg/dL Final   • Creatinine 10/08/2019 1.04* 0.57 - 1.00 mg/dL Final   • Sodium 10/08/2019 138  136 - 145 mmol/L Final   • Potassium 10/08/2019 4.5  3.5 - 5.2 mmol/L Final   • Chloride 10/08/2019 102  98 - 107 mmol/L Final   • CO2 10/08/2019 24.7  22.0 - 29.0 mmol/L Final   • Calcium 10/08/2019 9.7  8.6 - 10.5 mg/dL Final   • Total Protein 10/08/2019 6.9  6.0 - 8.5 g/dL Final   • Albumin 10/08/2019 3.70  3.50 - 5.20 g/dL Final   • ALT (SGPT) 10/08/2019 23  1 - 33 U/L Final   • AST (SGOT) 10/08/2019 24  1 - 32 U/L Final   • Alkaline Phosphatase 10/08/2019 83  39 - 117 U/L Final   • Total Bilirubin 10/08/2019 0.2  0.2 - 1.2 mg/dL Final   • eGFR Non African Amer 10/08/2019 53* >60 mL/min/1.73 Final   • Globulin 10/08/2019 3.2  gm/dL Final   • A/G Ratio 10/08/2019 1.2  g/dL Final   • BUN/Creatinine Ratio 10/08/2019 24.0  7.0 - 25.0 Final   • Anion Gap 10/08/2019 11.3  5.0 - 15.0 mmol/L Final   • WBC 10/08/2019 7.24  3.40 - 10.80 10*3/mm3 Final   • RBC 10/08/2019 3.23* 3.77 - 5.28 10*6/mm3 Final   • Hemoglobin 10/08/2019 10.4* 12.0 - 15.9 g/dL Final   • Hematocrit 10/08/2019 31.1* 34.0 - 46.6 % Final   • MCV 10/08/2019 96.3  79.0 - 97.0 fL Final   • MCH 10/08/2019 32.2  26.6 - 33.0 pg Final   • MCHC 10/08/2019 33.4  31.5 - 35.7 g/dL Final   • RDW 10/08/2019 13.1  12.3 - 15.4 % Final   • RDW-SD 10/08/2019 46.4  37.0 - 54.0 fl Final   • MPV 10/08/2019 11.3  6.0 - 12.0 fL Final   • Platelets 10/08/2019 217  140 - 450 10*3/mm3 Final   • Neutrophil % 10/08/2019 62.1   42.7 - 76.0 % Final   • Lymphocyte % 10/08/2019 26.1  19.6 - 45.3 % Final   • Monocyte % 10/08/2019 6.9  5.0 - 12.0 % Final   • Eosinophil % 10/08/2019 3.9  0.3 - 6.2 % Final   • Basophil % 10/08/2019 0.4  0.0 - 1.5 % Final   • Immature Grans % 10/08/2019 0.6* 0.0 - 0.5 % Final   • Neutrophils, Absolute 10/08/2019 4.50  1.70 - 7.00 10*3/mm3 Final   • Lymphocytes, Absolute 10/08/2019 1.89  0.70 - 3.10 10*3/mm3 Final   • Monocytes, Absolute 10/08/2019 0.50  0.10 - 0.90 10*3/mm3 Final   • Eosinophils, Absolute 10/08/2019 0.28  0.00 - 0.40 10*3/mm3 Final   • Basophils, Absolute 10/08/2019 0.03  0.00 - 0.20 10*3/mm3 Final   • Immature Grans, Absolute 10/08/2019 0.04  0.00 - 0.05 10*3/mm3 Final   • nRBC 10/08/2019 0.0  0.0 - 0.2 /100 WBC Final   • Glucose 10/08/2019 212* 70 - 130 mg/dL Final   • Glucose 10/08/2019 175* 65 - 99 mg/dL Final   • BUN 10/08/2019 22  8 - 23 mg/dL Final   • Creatinine 10/08/2019 0.92  0.57 - 1.00 mg/dL Final   • Sodium 10/08/2019 139  136 - 145 mmol/L Final   • Potassium 10/08/2019 4.6  3.5 - 5.2 mmol/L Final   • Chloride 10/08/2019 103  98 - 107 mmol/L Final   • CO2 10/08/2019 24.7  22.0 - 29.0 mmol/L Final   • Calcium 10/08/2019 9.6  8.6 - 10.5 mg/dL Final   • eGFR Non  Amer 10/08/2019 61  >60 mL/min/1.73 Final   • BUN/Creatinine Ratio 10/08/2019 23.9  7.0 - 25.0 Final   • Anion Gap 10/08/2019 11.3  5.0 - 15.0 mmol/L Final   • WBC 10/08/2019 7.80  3.40 - 10.80 10*3/mm3 Final   • RBC 10/08/2019 3.23* 3.77 - 5.28 10*6/mm3 Final   • Hemoglobin 10/08/2019 10.3* 12.0 - 15.9 g/dL Final   • Hematocrit 10/08/2019 31.3* 34.0 - 46.6 % Final   • MCV 10/08/2019 96.9  79.0 - 97.0 fL Final   • MCH 10/08/2019 31.9  26.6 - 33.0 pg Final   • MCHC 10/08/2019 32.9  31.5 - 35.7 g/dL Final   • RDW 10/08/2019 13.2  12.3 - 15.4 % Final   • RDW-SD 10/08/2019 47.2  37.0 - 54.0 fl Final   • MPV 10/08/2019 11.1  6.0 - 12.0 fL Final   • Platelets 10/08/2019 215  140 - 450 10*3/mm3 Final   • Neutrophil % 10/08/2019  61.6  42.7 - 76.0 % Final   • Lymphocyte % 10/08/2019 26.8  19.6 - 45.3 % Final   • Monocyte % 10/08/2019 7.4  5.0 - 12.0 % Final   • Eosinophil % 10/08/2019 3.2  0.3 - 6.2 % Final   • Basophil % 10/08/2019 0.5  0.0 - 1.5 % Final   • Immature Grans % 10/08/2019 0.5  0.0 - 0.5 % Final   • Neutrophils, Absolute 10/08/2019 4.80  1.70 - 7.00 10*3/mm3 Final   • Lymphocytes, Absolute 10/08/2019 2.09  0.70 - 3.10 10*3/mm3 Final   • Monocytes, Absolute 10/08/2019 0.58  0.10 - 0.90 10*3/mm3 Final   • Eosinophils, Absolute 10/08/2019 0.25  0.00 - 0.40 10*3/mm3 Final   • Basophils, Absolute 10/08/2019 0.04  0.00 - 0.20 10*3/mm3 Final   • Immature Grans, Absolute 10/08/2019 0.04  0.00 - 0.05 10*3/mm3 Final   • nRBC 10/08/2019 0.0  0.0 - 0.2 /100 WBC Final   • Hemoglobin A1C 10/08/2019 7.10* 4.80 - 5.60 % Final   • TSH 10/08/2019 4.620* 0.270 - 4.200 uIU/mL Final   • Glucose 10/08/2019 194* 70 - 130 mg/dL Final   • Glucose 10/08/2019 142* 70 - 130 mg/dL Final   • Glucose 10/08/2019 184* 70 - 130 mg/dL Final   • Glucose 10/08/2019 232* 70 - 130 mg/dL Final   • Glucose 10/09/2019 86  65 - 99 mg/dL Final   • BUN 10/09/2019 21  8 - 23 mg/dL Final   • Creatinine 10/09/2019 0.90  0.57 - 1.00 mg/dL Final   • Sodium 10/09/2019 141  136 - 145 mmol/L Final   • Potassium 10/09/2019 4.6  3.5 - 5.2 mmol/L Final   • Chloride 10/09/2019 105  98 - 107 mmol/L Final   • CO2 10/09/2019 22.7  22.0 - 29.0 mmol/L Final   • Calcium 10/09/2019 9.0  8.6 - 10.5 mg/dL Final   • eGFR Non African Amer 10/09/2019 62  >60 mL/min/1.73 Final   • BUN/Creatinine Ratio 10/09/2019 23.3  7.0 - 25.0 Final   • Anion Gap 10/09/2019 13.3  5.0 - 15.0 mmol/L Final   • Glucose 10/09/2019 83  70 - 130 mg/dL Final   • Glucose 10/09/2019 163* 70 - 130 mg/dL Final   • Glucose 10/09/2019 288* 70 - 130 mg/dL Final   • Glucose 10/09/2019 248* 70 - 130 mg/dL Final   • Glucose 10/10/2019 285* 65 - 99 mg/dL Final   • BUN 10/10/2019 24* 8 - 23 mg/dL Final   • Creatinine 10/10/2019  0.95  0.57 - 1.00 mg/dL Final   • Sodium 10/10/2019 137  136 - 145 mmol/L Final   • Potassium 10/10/2019 5.0  3.5 - 5.2 mmol/L Final   • Chloride 10/10/2019 102  98 - 107 mmol/L Final   • CO2 10/10/2019 25.0  22.0 - 29.0 mmol/L Final   • Calcium 10/10/2019 8.7  8.6 - 10.5 mg/dL Final   • Total Protein 10/10/2019 6.9  6.0 - 8.5 g/dL Final   • Albumin 10/10/2019 3.50  3.50 - 5.20 g/dL Final   • ALT (SGPT) 10/10/2019 20  1 - 33 U/L Final   • AST (SGOT) 10/10/2019 14  1 - 32 U/L Final   • Alkaline Phosphatase 10/10/2019 72  39 - 117 U/L Final   • Total Bilirubin 10/10/2019 0.3  0.2 - 1.2 mg/dL Final   • eGFR Non African Amer 10/10/2019 58* >60 mL/min/1.73 Final   • Globulin 10/10/2019 3.4  gm/dL Final   • A/G Ratio 10/10/2019 1.0  g/dL Final   • BUN/Creatinine Ratio 10/10/2019 25.3* 7.0 - 25.0 Final   • Anion Gap 10/10/2019 10.0  5.0 - 15.0 mmol/L Final   • Glucose 10/10/2019 254* 70 - 130 mg/dL Final   • Glucose 10/10/2019 235* 70 - 130 mg/dL Final     Assessment/Plan   Diagnoses and all orders for this visit:    1. Hyperthyroidism (Primary)    2. Thyroid nodule    3. Paroxysmal atrial fibrillation (CMS/HCC)    4. Hyperlipidemia, unspecified hyperlipidemia type    5. Type 2 diabetes mellitus without complication, with long-term current use of insulin (HCC)      Check thyroid function tests, thyroid-stimulating hemoglobin and thyroid peroxidase antibody.  Continue methimazole 5 mg once a day and adjust dose if needed.  Continue metoprolol tartrate, furosemide, and Xarelto per Dr. Ramos.  Continue atorvastatin 20 mg/day.  Continue Lantus and glyburide Metformin.  Check blood sugars regularly and send results in 2 to 3 weeks.    Copy my note sent to Dr. Ramos and Edith PAL.    RTC 4 mos

## 2021-09-22 ENCOUNTER — OFFICE VISIT (OUTPATIENT)
Dept: ENDOCRINOLOGY | Age: 71
End: 2021-09-22

## 2021-09-22 VITALS
HEIGHT: 63 IN | OXYGEN SATURATION: 97 % | BODY MASS INDEX: 39.16 KG/M2 | WEIGHT: 221 LBS | HEART RATE: 71 BPM | DIASTOLIC BLOOD PRESSURE: 66 MMHG | SYSTOLIC BLOOD PRESSURE: 134 MMHG

## 2021-09-22 DIAGNOSIS — E05.90 HYPERTHYROIDISM: Primary | Chronic | ICD-10-CM

## 2021-09-22 DIAGNOSIS — E11.9 TYPE 2 DIABETES MELLITUS WITHOUT COMPLICATION, WITH LONG-TERM CURRENT USE OF INSULIN (HCC): ICD-10-CM

## 2021-09-22 DIAGNOSIS — Z79.4 TYPE 2 DIABETES MELLITUS WITHOUT COMPLICATION, WITH LONG-TERM CURRENT USE OF INSULIN (HCC): ICD-10-CM

## 2021-09-22 DIAGNOSIS — I48.0 PAROXYSMAL ATRIAL FIBRILLATION (HCC): ICD-10-CM

## 2021-09-22 DIAGNOSIS — E04.1 THYROID NODULE: ICD-10-CM

## 2021-09-22 DIAGNOSIS — I10 ESSENTIAL HYPERTENSION: ICD-10-CM

## 2021-09-22 DIAGNOSIS — E78.5 HYPERLIPIDEMIA, UNSPECIFIED HYPERLIPIDEMIA TYPE: ICD-10-CM

## 2021-09-22 DIAGNOSIS — I48.0 PAROXYSMAL ATRIAL FIBRILLATION (HCC): Chronic | ICD-10-CM

## 2021-09-22 DIAGNOSIS — E05.90 HYPERTHYROIDISM: Primary | ICD-10-CM

## 2021-09-22 PROCEDURE — 99204 OFFICE O/P NEW MOD 45 MIN: CPT | Performed by: INTERNAL MEDICINE

## 2021-09-23 ENCOUNTER — CLINICAL SUPPORT (OUTPATIENT)
Dept: ORTHOPEDIC SURGERY | Facility: CLINIC | Age: 71
End: 2021-09-23

## 2021-09-23 VITALS — HEIGHT: 63 IN | WEIGHT: 221 LBS | BODY MASS INDEX: 39.16 KG/M2

## 2021-09-23 DIAGNOSIS — M17.12 PRIMARY OSTEOARTHRITIS OF LEFT KNEE: Primary | ICD-10-CM

## 2021-09-23 LAB
ALBUMIN SERPL-MCNC: 4.4 G/DL (ref 3.5–5.2)
ALBUMIN/CREAT UR: 12 MG/G CREAT (ref 0–29)
ALBUMIN/GLOB SERPL: 1.8 G/DL
ALP SERPL-CCNC: 72 U/L (ref 39–117)
ALT SERPL-CCNC: 16 U/L (ref 1–33)
AST SERPL-CCNC: 17 U/L (ref 1–32)
BILIRUB SERPL-MCNC: 0.3 MG/DL (ref 0–1.2)
BUN SERPL-MCNC: 29 MG/DL (ref 8–23)
BUN/CREAT SERPL: 27.9 (ref 7–25)
CALCIUM SERPL-MCNC: 9.6 MG/DL (ref 8.6–10.5)
CHLORIDE SERPL-SCNC: 101 MMOL/L (ref 98–107)
CHOLEST SERPL-MCNC: 119 MG/DL (ref 0–200)
CO2 SERPL-SCNC: 23.8 MMOL/L (ref 22–29)
CREAT SERPL-MCNC: 1.04 MG/DL (ref 0.57–1)
CREAT UR-MCNC: 93.9 MG/DL
GLOBULIN SER CALC-MCNC: 2.4 GM/DL
GLUCOSE SERPL-MCNC: 119 MG/DL (ref 65–99)
HBA1C MFR BLD: 7.7 % (ref 4.8–5.6)
HDLC SERPL-MCNC: 67 MG/DL (ref 40–60)
IMP & REVIEW OF LAB RESULTS: NORMAL
LDLC SERPL CALC-MCNC: 35 MG/DL (ref 0–100)
MICROALBUMIN UR-MCNC: 11.1 UG/ML
POTASSIUM SERPL-SCNC: 4.4 MMOL/L (ref 3.5–5.2)
PROT SERPL-MCNC: 6.8 G/DL (ref 6–8.5)
SODIUM SERPL-SCNC: 136 MMOL/L (ref 136–145)
T4 FREE SERPL-MCNC: 1.28 NG/DL (ref 0.93–1.7)
THYROPEROXIDASE AB SERPL-ACNC: <8 IU/ML (ref 0–34)
TRIGL SERPL-MCNC: 88 MG/DL (ref 0–150)
TSH SERPL DL<=0.005 MIU/L-ACNC: 1.82 UIU/ML (ref 0.27–4.2)
TSI SER-ACNC: 0.11 IU/L (ref 0–0.55)
VLDLC SERPL CALC-MCNC: 17 MG/DL (ref 5–40)

## 2021-09-23 PROCEDURE — 20610 DRAIN/INJ JOINT/BURSA W/O US: CPT | Performed by: ORTHOPAEDIC SURGERY

## 2021-09-23 NOTE — PROGRESS NOTES
Procedure   Large Joint Arthrocentesis: L knee  Date/Time: 9/23/2021 10:51 AM  Consent given by: patient  Site marked: site marked  Timeout: Immediately prior to procedure a time out was called to verify the correct patient, procedure, equipment, support staff and site/side marked as required   Supporting Documentation  Indications: pain   Procedure Details  Location: knee - L knee  Preparation: Patient was prepped and draped in the usual sterile fashion  Needle size: 20 G  Approach: superior  Medications administered: 30 mg Cross-Linked Hyaluronate 30 MG/3ML  Patient tolerance: patient tolerated the procedure well with no immediate complications            Patient presents to clinic today for left knee viscosupplement one shot injection. I explained details of injections as well as risks, benefits and alternatives with the patient today, had all questions answered, wished to proceed with injection.  I will see patient back in 6 weeks for follow up on injection. Patient was instructed to watch for signs or symptoms of infection including redness, swelling, warmth to the touch, or significant increased pain and to contact our office immediately if any of these issues were noted.

## 2021-09-26 NOTE — PROGRESS NOTES
LDL 35.  HDL 67.  Triglycerides 88.  Continue Lipitor 20 mg/day.Hemoglobin A1c slightly higher at 7.7%.Urine microalbumin normal.Normal thyroid function test.    Normal thyroid peroxidase antibody.  Normal thyroid-stimulating immunoglobulin.Continue methimazole 5 mg daily.  Send copy of labs to KAE Howard and Dr. Ramos.  Copy of labs sent to patient through my chart

## 2021-11-01 ENCOUNTER — TRANSCRIBE ORDERS (OUTPATIENT)
Dept: ADMINISTRATIVE | Facility: HOSPITAL | Age: 71
End: 2021-11-01

## 2021-11-01 DIAGNOSIS — Z12.31 ENCOUNTER FOR SCREENING MAMMOGRAM FOR MALIGNANT NEOPLASM OF BREAST: ICD-10-CM

## 2021-11-01 DIAGNOSIS — Z12.39 BREAST SCREENING: ICD-10-CM

## 2021-11-01 DIAGNOSIS — N95.1 MENOPAUSAL STATE: Primary | ICD-10-CM

## 2021-11-24 ENCOUNTER — TELEPHONE (OUTPATIENT)
Dept: GASTROENTEROLOGY | Facility: CLINIC | Age: 71
End: 2021-11-24

## 2021-12-03 ENCOUNTER — HOSPITAL ENCOUNTER (OUTPATIENT)
Dept: MAMMOGRAPHY | Facility: HOSPITAL | Age: 71
Discharge: HOME | End: 2021-12-03

## 2021-12-03 ENCOUNTER — PREP FOR SURGERY (OUTPATIENT)
Dept: OTHER | Facility: HOSPITAL | Age: 71
End: 2021-12-03

## 2021-12-03 ENCOUNTER — APPOINTMENT (OUTPATIENT)
Dept: BONE DENSITY | Facility: HOSPITAL | Age: 71
End: 2021-12-03

## 2021-12-03 DIAGNOSIS — Z12.11 SCREENING FOR MALIGNANT NEOPLASM OF COLON: Primary | ICD-10-CM

## 2021-12-03 DIAGNOSIS — N95.1 MENOPAUSAL STATE: ICD-10-CM

## 2021-12-03 DIAGNOSIS — Z01.818 OTHER SPECIFIED PRE-OPERATIVE EXAMINATION: ICD-10-CM

## 2021-12-03 DIAGNOSIS — Z12.39 BREAST SCREENING: ICD-10-CM

## 2021-12-03 DIAGNOSIS — Z12.31 ENCOUNTER FOR SCREENING MAMMOGRAM FOR MALIGNANT NEOPLASM OF BREAST: ICD-10-CM

## 2021-12-03 PROCEDURE — 77063 BREAST TOMOSYNTHESIS BI: CPT

## 2021-12-03 PROCEDURE — 77080 DXA BONE DENSITY AXIAL: CPT

## 2021-12-03 PROCEDURE — 77067 SCR MAMMO BI INCL CAD: CPT

## 2021-12-13 NOTE — TELEPHONE ENCOUNTER
SPOKE WITH PATIENT.  SCHEDULED AT [facility] ON 04/29/2022 AT 1:15PM, ARRIVE 12PM.  WILL MAIL INSTRUCTIONS.    COVID TEST ON 04/27/2021, WILL CALL WITH TIME.

## 2021-12-16 ENCOUNTER — TELEPHONE (OUTPATIENT)
Dept: GASTROENTEROLOGY | Facility: CLINIC | Age: 71
End: 2021-12-16

## 2022-01-05 ENCOUNTER — TRANSCRIBE ORDERS (OUTPATIENT)
Dept: ADMINISTRATIVE | Facility: HOSPITAL | Age: 72
End: 2022-01-05

## 2022-01-05 DIAGNOSIS — R92.8 ABNORMAL MAMMOGRAM: Primary | ICD-10-CM

## 2022-01-20 ENCOUNTER — TELEMEDICINE (OUTPATIENT)
Dept: ENDOCRINOLOGY | Age: 72
End: 2022-01-20

## 2022-01-20 DIAGNOSIS — Z79.4 TYPE 2 DIABETES MELLITUS WITHOUT COMPLICATION, WITH LONG-TERM CURRENT USE OF INSULIN: ICD-10-CM

## 2022-01-20 DIAGNOSIS — G47.33 OBSTRUCTIVE SLEEP APNEA SYNDROME: ICD-10-CM

## 2022-01-20 DIAGNOSIS — E05.90 HYPERTHYROIDISM: Primary | Chronic | ICD-10-CM

## 2022-01-20 DIAGNOSIS — E11.9 TYPE 2 DIABETES MELLITUS WITHOUT COMPLICATION, WITH LONG-TERM CURRENT USE OF INSULIN: ICD-10-CM

## 2022-01-20 DIAGNOSIS — E78.5 HYPERLIPIDEMIA, UNSPECIFIED HYPERLIPIDEMIA TYPE: ICD-10-CM

## 2022-01-20 DIAGNOSIS — M85.852 OSTEOPENIA OF NECK OF LEFT FEMUR: ICD-10-CM

## 2022-01-20 PROCEDURE — 99443 PR PHYS/QHP TELEPHONE EVALUATION 21-30 MIN: CPT | Performed by: INTERNAL MEDICINE

## 2022-01-20 NOTE — PROGRESS NOTES
Otf Gavin is a 71 y.o. female.     History of Present Illness     Patient is a 71-year-old female consented to a phone visit.     She was found to be hyperthyroid in 2015 by Dr. Landry.  She was started on antithyroid medication and is on methimazole 5 mg once a day.  She has no significant weight change.  She denies tremors.  She denies heat or cold intolerance.  She denies bowel changes.     She has hypertension and paroxysmal atrial fibrillation.  She has no history of heart attack back or stroke.  She denies chest pain or palpitations.  She is on metoprolol tartrate 50 mg twice a day, furosemide 20 mg once a day and Xarelto 20 mg once a day.  She follows with Dr. Ramos.     She has known diabetes mellitus since 2000 and started on insulin in 2001.  She is on Lantus 60 units every morning, Januvia and glyburide/metformin 5/500 2 tablets twice a day.    She checks her blood sugars at least once a day.  Fasting glucose are in the 200s.  Suppertime glucose 122-144.  She has not used GLP-1 agonist. She denies early morning hypoglycemia.       Her last eye examination was in 2021.  She has no known retinopathy.  She denies nephropathy.    Urine microalbumin was normal in September 2021.  She has occasional tingling in both hands but no pain.     She has hyperlipidemia and is on Lipitor 20 mg/day.  She denies myalgia.     She has sleep apnea and uses a CPAP regularly.     She had a total hysterectomy and bilateral salpingo-oophorectomy at age 45 for heavy vaginal bleeding she was not placed on estrogen replacement therapy.  She denies alcohol or tobacco abuse.  Her mother had a hip fracture in her 50's.    Bone density done in December 2021 showed osteopenia of the left hip.  Her 10-year probability of major osteoporotic fracture is 15% and of hip fracture is 2.8%.  She was started on calcium 1 tablet twice a day by her PCP.    The following portions of the patient's history were reviewed and  updated as appropriate: allergies, current medications, past family history, past medical history, past social history, past surgical history and problem list.    Review of Systems   Respiratory: Negative for shortness of breath.    Cardiovascular: Negative for chest pain and palpitations.   Gastrointestinal: Negative.    Endocrine: Negative.    Genitourinary: Negative.    Musculoskeletal: Negative for myalgias.     Objective   Physical Exam  Office Visit on 09/22/2021   Component Date Value Ref Range Status   • Glucose 09/22/2021 119* 65 - 99 mg/dL Final   • BUN 09/22/2021 29* 8 - 23 mg/dL Final   • Creatinine 09/22/2021 1.04* 0.57 - 1.00 mg/dL Final   • eGFR Non African Am 09/22/2021 53* >60 mL/min/1.73 Final    Comment: GFR Normal >60  Chronic Kidney Disease <60  Kidney Failure <15     • eGFR  Am 09/22/2021 64  >60 mL/min/1.73 Final   • BUN/Creatinine Ratio 09/22/2021 27.9* 7.0 - 25.0 Final   • Sodium 09/22/2021 136  136 - 145 mmol/L Final   • Potassium 09/22/2021 4.4  3.5 - 5.2 mmol/L Final   • Chloride 09/22/2021 101  98 - 107 mmol/L Final   • Total CO2 09/22/2021 23.8  22.0 - 29.0 mmol/L Final   • Calcium 09/22/2021 9.6  8.6 - 10.5 mg/dL Final   • Total Protein 09/22/2021 6.8  6.0 - 8.5 g/dL Final   • Albumin 09/22/2021 4.40  3.50 - 5.20 g/dL Final   • Globulin 09/22/2021 2.4  gm/dL Final   • A/G Ratio 09/22/2021 1.8  g/dL Final   • Total Bilirubin 09/22/2021 0.3  0.0 - 1.2 mg/dL Final   • Alkaline Phosphatase 09/22/2021 72  39 - 117 U/L Final   • AST (SGOT) 09/22/2021 17  1 - 32 U/L Final   • ALT (SGPT) 09/22/2021 16  1 - 33 U/L Final   • Total Cholesterol 09/22/2021 119  0 - 200 mg/dL Final    Comment: Cholesterol Reference Ranges  (U.S. Department of Health and Human Services ATP III  Classifications)  Desirable          <200 mg/dL  Borderline High    200-239 mg/dL  High Risk          >240 mg/dL  Triglyceride Reference Ranges  (U.S. Department of Health and Human Services ATP  III  Classifications)  Normal           <150 mg/dL  Borderline High  150-199 mg/dL  High             200-499 mg/dL  Very High        >500 mg/dL  HDL Reference Ranges  (U.S. Department of Health and Human Services ATP III  Classifcations)  Low     <40 mg/dl (major risk factor for CHD)  High    >60 mg/dl ('negative' risk factor for CHD)  LDL Reference Ranges  (U.S. Department of Health and Human Services ATP III  Classifcations)  Optimal          <100 mg/dL  Near Optimal     100-129 mg/dL  Borderline High  130-159 mg/dL  High             160-189 mg/dL  Very High        >189 mg/dL     • Triglycerides 09/22/2021 88  0 - 150 mg/dL Final   • HDL Cholesterol 09/22/2021 67* 40 - 60 mg/dL Final   • VLDL Cholesterol Issa 09/22/2021 17  5 - 40 mg/dL Final   • LDL Chol Calc ([facility]) 09/22/2021 35  0 - 100 mg/dL Final   • Hemoglobin A1C 09/22/2021 7.70* 4.80 - 5.60 % Final    Comment: Hemoglobin A1C Ranges:  Increased Risk for Diabetes  5.7% to 6.4%  Diabetes                     >= 6.5%  Diabetic Goal                < 7.0%     • TSH 09/22/2021 1.820  0.270 - 4.200 uIU/mL Final   • Free T4 09/22/2021 1.28  0.93 - 1.70 ng/dL Final    Results may be falsely increased if patient taking Biotin.   • Thyroid Peroxidase Antibody 09/22/2021 <8  0 - 34 IU/mL Final   • Thyroid Stimulating Immunoglobulin 09/22/2021 0.11  0.00 - 0.55 IU/L Final   • Creatinine, Urine 09/22/2021 93.9  Not Estab. mg/dL Final   • Microalbumin, Urine 09/22/2021 11.1  Not Estab. ug/mL Final   • Microalbumin/Creatinine Ratio 09/22/2021 12  0 - 29 mg/g creat Final    Comment:                        Normal:                0 -  29                         Moderately increased: 30 - 300                         Severely increased:       >300     • Interpretation 09/22/2021 Note   Final    Supplemental report is available.     Assessment/Plan   Diagnoses and all orders for this visit:    1. Hyperthyroidism (Primary)  -     T4, Free  -     TSH  -     T3, Free    2.  Hyperlipidemia, unspecified hyperlipidemia type  -     Lipid Panel    3. Type 2 diabetes mellitus without complication, with long-term current use of insulin (HCC)  -     Comprehensive Metabolic Panel  -     Hemoglobin A1c    4. Obstructive sleep apnea syndrome    5. Osteopenia of neck of left femur  -     Vitamin D 25 Hydroxy      Continue methimazole 5 mg daily.  Continue atorvastatin 20 mg/day.  Change Lantus schedule to 60 units every evening.  Continue Januvia and glyburide metformin per PCP.  Bring in blood sugar records when she comes in for blood work.  Continue CPAP.  Continue calcium and vitamin D supplements.  Walk 3-4 times a week for 30 to 45 minutes.    Copy of my note sent to [facility].    Follow-up in 4 months.  Total time 34 minutes

## 2022-02-04 ENCOUNTER — APPOINTMENT (OUTPATIENT)
Dept: ULTRASOUND IMAGING | Facility: HOSPITAL | Age: 72
End: 2022-02-04

## 2022-02-04 ENCOUNTER — HOSPITAL ENCOUNTER (OUTPATIENT)
Dept: MAMMOGRAPHY | Facility: HOSPITAL | Age: 72
End: 2022-02-04

## 2022-02-08 LAB
25(OH)D3+25(OH)D2 SERPL-MCNC: 21.3 NG/ML (ref 30–100)
ALBUMIN SERPL-MCNC: 4.2 G/DL (ref 3.8–4.8)
ALBUMIN/GLOB SERPL: 1.4 {RATIO} (ref 1.2–2.2)
ALP SERPL-CCNC: 66 IU/L (ref 44–121)
ALT SERPL-CCNC: 10 IU/L (ref 0–32)
AST SERPL-CCNC: 14 IU/L (ref 0–40)
BILIRUB SERPL-MCNC: 0.2 MG/DL (ref 0–1.2)
BUN SERPL-MCNC: 37 MG/DL (ref 8–27)
BUN/CREAT SERPL: 26 (ref 12–28)
CALCIUM SERPL-MCNC: 9.3 MG/DL (ref 8.7–10.3)
CHLORIDE SERPL-SCNC: 106 MMOL/L (ref 96–106)
CHOLEST SERPL-MCNC: 119 MG/DL (ref 100–199)
CO2 SERPL-SCNC: 20 MMOL/L (ref 20–29)
CREAT SERPL-MCNC: 1.45 MG/DL (ref 0.57–1)
GLOBULIN SER CALC-MCNC: 3 G/DL (ref 1.5–4.5)
GLUCOSE SERPL-MCNC: 203 MG/DL (ref 65–99)
HBA1C MFR BLD: 7.4 % (ref 4.8–5.6)
HDLC SERPL-MCNC: 53 MG/DL
IMP & REVIEW OF LAB RESULTS: NORMAL
LDLC SERPL CALC-MCNC: 42 MG/DL (ref 0–99)
POTASSIUM SERPL-SCNC: 5.1 MMOL/L (ref 3.5–5.2)
PROT SERPL-MCNC: 7.2 G/DL (ref 6–8.5)
REPORT: NORMAL
SODIUM SERPL-SCNC: 142 MMOL/L (ref 134–144)
T3FREE SERPL-MCNC: 2.5 PG/ML (ref 2–4.4)
T4 FREE SERPL-MCNC: 1.27 NG/DL (ref 0.82–1.77)
TRIGL SERPL-MCNC: 141 MG/DL (ref 0–149)
TSH SERPL DL<=0.005 MIU/L-ACNC: 3.5 UIU/ML (ref 0.45–4.5)
VLDLC SERPL CALC-MCNC: 24 MG/DL (ref 5–40)

## 2022-02-08 RX ORDER — CHOLECALCIFEROL (VITAMIN D3) 125 MCG
CAPSULE ORAL
Qty: 100 TABLET | Refills: 3 | Status: SHIPPED | OUTPATIENT
Start: 2022-02-08 | End: 2023-02-28

## 2022-02-21 ENCOUNTER — HOSPITAL ENCOUNTER (OUTPATIENT)
Dept: MAMMOGRAPHY | Facility: HOSPITAL | Age: 72
Discharge: HOME | End: 2022-02-21

## 2022-02-21 ENCOUNTER — HOSPITAL ENCOUNTER (OUTPATIENT)
Dept: ULTRASOUND IMAGING | Facility: HOSPITAL | Age: 72
Discharge: HOME | End: 2022-02-21

## 2022-02-21 DIAGNOSIS — R92.8 ABNORMAL MAMMOGRAM: ICD-10-CM

## 2022-02-21 PROCEDURE — 76642 ULTRASOUND BREAST LIMITED: CPT

## 2022-02-21 PROCEDURE — G0279 TOMOSYNTHESIS, MAMMO: HCPCS

## 2022-02-21 PROCEDURE — 77065 DX MAMMO INCL CAD UNI: CPT

## 2022-04-07 NOTE — TELEPHONE ENCOUNTER
SPOKE WITH GEORGE GARDNER) AT DR. PAINTING OFFICE.  WAITING ON PATIENT TO CALL THEM BACK.  CALLED HER 03/31/2022 AND LEFT MESSAGE.  SHE DOES HAVE OFFICE APPOINTMENT ON 04/22/2022.    WILL REACH OUT TO PATIENT TO CALL DR. PAINTING OFFICE.

## 2022-04-26 NOTE — TELEPHONE ENCOUNTER
SPOKE WITH FARZANEH.  PATIENT WAS SEEN ON 04/22/2022.  CAN HOLD 3 DAYS PRIOR, 04/27, 04/28/ & 04/29/2022.  RESTART 04/30/2022.  PATIENT IS AWARE.

## 2022-04-28 ENCOUNTER — ANESTHESIA EVENT (OUTPATIENT)
Dept: PERIOP | Facility: HOSPITAL | Age: 72
End: 2022-04-28

## 2022-04-29 ENCOUNTER — HOSPITAL ENCOUNTER (OUTPATIENT)
Facility: HOSPITAL | Age: 72
Setting detail: HOSPITAL OUTPATIENT SURGERY
Discharge: HOME | End: 2022-04-29
Attending: INTERNAL MEDICINE | Admitting: INTERNAL MEDICINE

## 2022-04-29 ENCOUNTER — ANESTHESIA (OUTPATIENT)
Dept: PERIOP | Facility: HOSPITAL | Age: 72
End: 2022-04-29

## 2022-04-29 VITALS
BODY MASS INDEX: 36.53 KG/M2 | OXYGEN SATURATION: 96 % | WEIGHT: 206.2 LBS | HEART RATE: 72 BPM | DIASTOLIC BLOOD PRESSURE: 93 MMHG | RESPIRATION RATE: 15 BRPM | SYSTOLIC BLOOD PRESSURE: 141 MMHG | TEMPERATURE: 98.5 F

## 2022-04-29 LAB
GLUCOSE BLDC GLUCOMTR-MCNC: 206 MG/DL (ref 70–130)
POTASSIUM SERPL-SCNC: 4.5 MMOL/L (ref 3.5–5.2)

## 2022-04-29 PROCEDURE — 25010000002 PROPOFOL 10 MG/ML EMULSION: Performed by: NURSE ANESTHETIST, CERTIFIED REGISTERED

## 2022-04-29 PROCEDURE — 84132 ASSAY OF SERUM POTASSIUM: CPT | Performed by: NURSE ANESTHETIST, CERTIFIED REGISTERED

## 2022-04-29 PROCEDURE — G0121 COLON CA SCRN NOT HI RSK IND: HCPCS | Performed by: INTERNAL MEDICINE

## 2022-04-29 PROCEDURE — 82962 GLUCOSE BLOOD TEST: CPT

## 2022-04-29 RX ORDER — LIDOCAINE HYDROCHLORIDE 20 MG/ML
INJECTION, SOLUTION INFILTRATION; PERINEURAL AS NEEDED
Status: DISCONTINUED | OUTPATIENT
Start: 2022-04-29 | End: 2022-04-29 | Stop reason: SURG

## 2022-04-29 RX ORDER — LIDOCAINE HYDROCHLORIDE 10 MG/ML
0.5 INJECTION, SOLUTION EPIDURAL; INFILTRATION; INTRACAUDAL; PERINEURAL ONCE AS NEEDED
Status: DISCONTINUED | OUTPATIENT
Start: 2022-04-29 | End: 2022-04-29 | Stop reason: HOSPADM

## 2022-04-29 RX ORDER — PROPOFOL 10 MG/ML
VIAL (ML) INTRAVENOUS AS NEEDED
Status: DISCONTINUED | OUTPATIENT
Start: 2022-04-29 | End: 2022-04-29 | Stop reason: SURG

## 2022-04-29 RX ORDER — ONDANSETRON 2 MG/ML
4 INJECTION INTRAMUSCULAR; INTRAVENOUS ONCE AS NEEDED
Status: DISCONTINUED | OUTPATIENT
Start: 2022-04-29 | End: 2022-04-29 | Stop reason: HOSPADM

## 2022-04-29 RX ORDER — SODIUM CHLORIDE, SODIUM LACTATE, POTASSIUM CHLORIDE, CALCIUM CHLORIDE 600; 310; 30; 20 MG/100ML; MG/100ML; MG/100ML; MG/100ML
100 INJECTION, SOLUTION INTRAVENOUS CONTINUOUS
Status: DISCONTINUED | OUTPATIENT
Start: 2022-04-29 | End: 2022-04-29 | Stop reason: HOSPADM

## 2022-04-29 RX ORDER — SODIUM CHLORIDE 0.9 % (FLUSH) 0.9 %
10 SYRINGE (ML) INJECTION AS NEEDED
Status: DISCONTINUED | OUTPATIENT
Start: 2022-04-29 | End: 2022-04-29 | Stop reason: HOSPADM

## 2022-04-29 RX ORDER — SODIUM CHLORIDE 9 MG/ML
40 INJECTION, SOLUTION INTRAVENOUS AS NEEDED
Status: DISCONTINUED | OUTPATIENT
Start: 2022-04-29 | End: 2022-04-29 | Stop reason: HOSPADM

## 2022-04-29 RX ORDER — SODIUM CHLORIDE 0.9 % (FLUSH) 0.9 %
10 SYRINGE (ML) INJECTION EVERY 12 HOURS SCHEDULED
Status: DISCONTINUED | OUTPATIENT
Start: 2022-04-29 | End: 2022-04-29 | Stop reason: HOSPADM

## 2022-04-29 RX ORDER — SODIUM CHLORIDE, SODIUM LACTATE, POTASSIUM CHLORIDE, CALCIUM CHLORIDE 600; 310; 30; 20 MG/100ML; MG/100ML; MG/100ML; MG/100ML
9 INJECTION, SOLUTION INTRAVENOUS CONTINUOUS
Status: DISCONTINUED | OUTPATIENT
Start: 2022-04-29 | End: 2022-04-29 | Stop reason: HOSPADM

## 2022-04-29 RX ADMIN — PROPOFOL 40 MG: 10 INJECTION, EMULSION INTRAVENOUS at 14:21

## 2022-04-29 RX ADMIN — SODIUM CHLORIDE, POTASSIUM CHLORIDE, SODIUM LACTATE AND CALCIUM CHLORIDE: 600; 310; 30; 20 INJECTION, SOLUTION INTRAVENOUS at 13:08

## 2022-04-29 RX ADMIN — SODIUM CHLORIDE, POTASSIUM CHLORIDE, SODIUM LACTATE AND CALCIUM CHLORIDE: 600; 310; 30; 20 INJECTION, SOLUTION INTRAVENOUS at 14:03

## 2022-04-29 RX ADMIN — PROPOFOL 40 MG: 10 INJECTION, EMULSION INTRAVENOUS at 14:25

## 2022-04-29 RX ADMIN — PROPOFOL 40 MG: 10 INJECTION, EMULSION INTRAVENOUS at 14:29

## 2022-04-29 RX ADMIN — PROPOFOL 40 MG: 10 INJECTION, EMULSION INTRAVENOUS at 14:14

## 2022-04-29 RX ADMIN — LIDOCAINE HYDROCHLORIDE 100 MG: 20 INJECTION, SOLUTION INFILTRATION; PERINEURAL at 14:08

## 2022-04-29 RX ADMIN — PROPOFOL 50 MG: 10 INJECTION, EMULSION INTRAVENOUS at 14:08

## 2022-04-29 RX ADMIN — PROPOFOL 40 MG: 10 INJECTION, EMULSION INTRAVENOUS at 14:17

## 2022-04-29 RX ADMIN — PROPOFOL 40 MG: 10 INJECTION, EMULSION INTRAVENOUS at 14:11

## 2022-04-29 NOTE — ANESTHESIA POSTPROCEDURE EVALUATION
Patient: Lisa Gavin    Procedure Summary     Date: 04/29/22 Room / Location: [facility] ENDOSCOPY 1 /  [facility]    Anesthesia Start: 1404 Anesthesia Stop: 1440    Procedure: COLONOSCOPY (N/A ) Diagnosis:       Screening for malignant neoplasm of colon      Diverticulosis      (Screening for malignant neoplasm of colon [Z12.11])    Surgeons: Parmjit Traore MD Provider: Gabriela Cunha CRNA    Anesthesia Type: MAC ASA Status: 3          Anesthesia Type: MAC    Vitals  Vitals Value Taken Time   /70 04/29/22 1450   Temp     Pulse 72 04/29/22 1450   Resp 15 04/29/22 1450   SpO2 96 % 04/29/22 1450           Post Anesthesia Care and Evaluation    Patient location during evaluation: bedside  Patient participation: complete - patient participated  Level of consciousness: awake and alert  Pain score: 0  Pain management: adequate  Airway patency: patent  Anesthetic complications: No anesthetic complications  PONV Status: none  Cardiovascular status: acceptable  Respiratory status: acceptable  Hydration status: acceptable  No anesthesia care post op

## 2022-04-29 NOTE — ANESTHESIA PREPROCEDURE EVALUATION
Anesthesia Evaluation     Patient summary reviewed and Nursing notes reviewed   no history of anesthetic complications:  NPO Solid Status: > 8 hours  NPO Liquid Status: > 8 hours           Airway   Mallampati: III  TM distance: >3 FB  Neck ROM: full  No difficulty expected  Dental - normal exam     Pulmonary - normal exam   (+) sleep apnea on CPAP,   Cardiovascular   Exercise tolerance: good (4-7 METS)    Rhythm: regular  Rate: normal    (+) hypertension well controlled less than 2 medications, dysrhythmias Atrial Fib, hyperlipidemia,       Neuro/Psych  (+) psychiatric history Depression and Anxiety,    (-) headaches  GI/Hepatic/Renal/Endo    (+) obesity,  GERD well controlled,  diabetes mellitus type 2 well controlled using insulin, thyroid problem hypothyroidism    Musculoskeletal     Abdominal   (+) obese,    Substance History      OB/GYN negative ob/gyn ROS         Other   arthritis,                      Anesthesia Plan    ASA 3     MAC     intravenous induction     Anesthetic plan, all risks, benefits, and alternatives have been provided, discussed and informed consent has been obtained with: patient.  Use of blood products discussed with patient  Consented to blood products.       CODE STATUS:

## 2022-05-09 ENCOUNTER — TELEPHONE (OUTPATIENT)
Dept: ORTHOPEDIC SURGERY | Facility: CLINIC | Age: 72
End: 2022-05-09

## 2022-05-09 NOTE — TELEPHONE ENCOUNTER
Caller: PATIENT     Relationship to patient: SELF     Best call back number: [phone]      Chief complaint: LEFT KNEE PAIN     Type of visit: CORTISONE INJECTION     PT. CALLING TO SCHEDULE CORTISONE INJECTION FOR LEFT KNEE.

## 2022-05-16 ENCOUNTER — OFFICE VISIT (OUTPATIENT)
Dept: ORTHOPEDIC SURGERY | Facility: CLINIC | Age: 72
End: 2022-05-16

## 2022-05-16 VITALS — HEIGHT: 63 IN | BODY MASS INDEX: 36.5 KG/M2 | WEIGHT: 206 LBS

## 2022-05-16 DIAGNOSIS — M17.12 PRIMARY OSTEOARTHRITIS OF LEFT KNEE: Primary | ICD-10-CM

## 2022-05-16 PROCEDURE — 73562 X-RAY EXAM OF KNEE 3: CPT | Performed by: NURSE PRACTITIONER

## 2022-05-16 PROCEDURE — 20610 DRAIN/INJ JOINT/BURSA W/O US: CPT | Performed by: NURSE PRACTITIONER

## 2022-05-16 PROCEDURE — 99214 OFFICE O/P EST MOD 30 MIN: CPT | Performed by: NURSE PRACTITIONER

## 2022-05-16 RX ORDER — LIDOCAINE HYDROCHLORIDE 10 MG/ML
8 INJECTION, SOLUTION EPIDURAL; INFILTRATION; INTRACAUDAL; PERINEURAL
Status: COMPLETED | OUTPATIENT
Start: 2022-05-16 | End: 2022-05-16

## 2022-05-16 RX ORDER — TRIAMCINOLONE ACETONIDE 40 MG/ML
80 INJECTION, SUSPENSION INTRA-ARTICULAR; INTRAMUSCULAR
Status: COMPLETED | OUTPATIENT
Start: 2022-05-16 | End: 2022-05-16

## 2022-05-16 RX ADMIN — TRIAMCINOLONE ACETONIDE 80 MG: 40 INJECTION, SUSPENSION INTRA-ARTICULAR; INTRAMUSCULAR at 10:36

## 2022-05-16 RX ADMIN — LIDOCAINE HYDROCHLORIDE 8 ML: 10 INJECTION, SOLUTION EPIDURAL; INFILTRATION; INTRACAUDAL; PERINEURAL at 10:36

## 2022-05-16 NOTE — PROGRESS NOTES
Subjective:     Patient ID: Lisa Gavin is a 71 y.o. female.    Chief Complaint:   follow-up DJD left knee, acute onset pain  Corticosteroid injection received 8/13/2021   History of Present Illness  Lisa Gavin returns to clinic for follow-up of her left knee.  Received corticosteroid injection 8/13/2021 with significant symptom relief for several months into the last 3 weeks.  Pain has just began returning.  Localizes pain to the medial joint line as well as the anterior aspect of the knee.  She is experiencing swelling, pain with activities involving deep flexion, extension of the knee and transitional activity such in seated standing attempting to walk.  Rates discomfort a 7-8 out of a 10.  Denies any presence of numbness or tingling at the left lower extremity.  Pain is not rating to the groin or to the lateral aspect of the hip.  Is ambulating with a limp.  Denies other concerns present this time.     Social History     Occupational History   • Occupation:    Tobacco Use   • Smoking status: Never Smoker   • Smokeless tobacco: Never Used   Vaping Use   • Vaping Use: Never used   Substance and Sexual Activity   • Alcohol use: No   • Drug use: No   • Sexual activity: Defer      Past Medical History:   Diagnosis Date   • A-fib (HCC)    • Arthritis    • Depression    • GERD (gastroesophageal reflux disease)    • Headache    • Sleep apnea    • Type 2 diabetes mellitus (HCC)      Past Surgical History:   Procedure Laterality Date   • CHOLECYSTECTOMY     • COLONOSCOPY N/A 4/29/2022    Procedure: COLONOSCOPY;  Surgeon: Parmjit Traore MD;  Location: [facility];  Service: Gastroenterology;  Laterality: N/A;  Diverticulosis   • HYSTERECTOMY  1996    bilateral oophorectomy for heavy bleeding. No HRT   • ROTATOR CUFF REPAIR      ACUTE   • SHOULDER SURGERY Bilateral     hAS HAD ROTATOR CUFF SURGERY ON BOTH SHOULDERS       Family History   Problem Relation Age of Onset   • Cancer Other   "  • Diabetes Other    • Glaucoma Other    • Rheum arthritis Other    • Kidney disease Other    • Breast cancer Neg Hx                Objective:  Physical Exam    General: No acute distress.  Eyes: conjunctiva clear; pupils equally round and reactive  ENT: external ears and nose atraumatic; oropharynx clear  CV: no peripheral edema  Resp: normal respiratory effort  Skin: no rashes or wounds; normal turgor  Psych: mood and affect appropriate; recent and remote memory intact    Vitals:    05/16/22 1008   Weight: 93.4 kg (206 lb)   Height: 160 cm (63\")         05/16/22  1008   Weight: 93.4 kg (206 lb)     Body mass index is 36.49 kg/m².      Ortho Exam     Left Knee Exam      Tenderness   The patient is experiencing tenderness in the medial joint line and patella.     Range of Motion   Extension: 3   Left knee flexion: 120     Tests   Varus: negative Valgus: negative  Positive medial Carla's, negative lateral Carla's  Lachman:  Anterior - 1+    ,  Patellar apprehension: positive     Other   Erythema: absent  Sensation: normal  Pulse: present  Swelling: moderate     Comments:    Positive active patellar compression test  Positive tenderness lateral patellar facet  Positive crepitus arc of motion    Imaging:  Left Knee X-Ray  Indication: Pain    AP, Lateral, and Anthem views    Findings:  No fracture  No bony lesion  Normal soft tissues  Advanced medial compartment narrowing bone-on-bone articulation, reactive osteophytes in all 3 compartments, near bone-on-bone articulation patellofemoral joint  prior studies were available for comparison.    Assessment:        1. Primary osteoarthritis of left knee           Plan:  Large Joint Arthrocentesis: L knee  Date/Time: 5/16/2022 10:36 AM  Consent given by: patient  Site marked: site marked  Timeout: Immediately prior to procedure a time out was called to verify the correct patient, procedure, equipment, support staff and site/side marked as required   Supporting " Documentation  Indications: pain   Procedure Details  Location: knee - L knee  Preparation: Patient was prepped and draped in the usual sterile fashion  Needle size: 22 G  Approach: superior (lateral)  Medications administered: 80 mg triamcinolone acetonide 40 MG/ML; 8 mL lidocaine PF 1% 1 %  Patient tolerance: patient tolerated the procedure well with no immediate complications          1. Discussion with patient and spouse regarding treatment options.  Discussed corticosteroid injection left knee versus total knee arthroplasty.  At this time she is not ready to proceed with any total knee arthroplasty.  Wishes to proceed with corticosteroid injection only left knee.  Discussed to continue weightbearing as tolerated we will plan to see her back in clinic if she is not improving.  Can discuss viscosupplementation injections as an option can repeat corticosteroid injections once every 3 months if needed.  All questions answered.  Orders:  Orders Placed This Encounter   Procedures   • Large Joint Arthrocentesis: L knee   • XR Knee 3+ View With Dilley Left     No orders of the defined types were placed in this encounter.          Dragon dictation utilized.

## 2022-06-06 ENCOUNTER — HOSPITAL ENCOUNTER (EMERGENCY)
Facility: HOSPITAL | Age: 72
Discharge: HOME | End: 2022-06-06
Attending: EMERGENCY MEDICINE | Admitting: EMERGENCY MEDICINE

## 2022-06-06 ENCOUNTER — APPOINTMENT (OUTPATIENT)
Dept: GENERAL RADIOLOGY | Facility: HOSPITAL | Age: 72
End: 2022-06-06

## 2022-06-06 VITALS
SYSTOLIC BLOOD PRESSURE: 159 MMHG | WEIGHT: 213.2 LBS | BODY MASS INDEX: 37.78 KG/M2 | TEMPERATURE: 97.1 F | HEART RATE: 85 BPM | DIASTOLIC BLOOD PRESSURE: 76 MMHG | OXYGEN SATURATION: 97 % | RESPIRATION RATE: 18 BRPM | HEIGHT: 63 IN

## 2022-06-06 DIAGNOSIS — M79.671 RIGHT FOOT PAIN: Primary | ICD-10-CM

## 2022-06-06 PROCEDURE — 99283 EMERGENCY DEPT VISIT LOW MDM: CPT

## 2022-06-06 PROCEDURE — 99282 EMERGENCY DEPT VISIT SF MDM: CPT | Performed by: EMERGENCY MEDICINE

## 2022-06-06 PROCEDURE — 73630 X-RAY EXAM OF FOOT: CPT

## 2022-06-06 RX ORDER — TRAMADOL HYDROCHLORIDE 50 MG/1
50 TABLET ORAL EVERY 6 HOURS PRN
Qty: 18 TABLET | Refills: 0 | Status: SHIPPED | OUTPATIENT
Start: 2022-06-06 | End: 2023-02-18

## 2022-06-06 RX ORDER — CEPHALEXIN 500 MG/1
500 CAPSULE ORAL ONCE
Status: COMPLETED | OUTPATIENT
Start: 2022-06-06 | End: 2022-06-06

## 2022-06-06 RX ORDER — OXYCODONE HYDROCHLORIDE AND ACETAMINOPHEN 5; 325 MG/1; MG/1
1 TABLET ORAL ONCE
Status: COMPLETED | OUTPATIENT
Start: 2022-06-06 | End: 2022-06-06

## 2022-06-06 RX ORDER — CEPHALEXIN 500 MG/1
500 CAPSULE ORAL 4 TIMES DAILY
Qty: 40 CAPSULE | Refills: 0 | Status: SHIPPED | OUTPATIENT
Start: 2022-06-06 | End: 2022-06-16

## 2022-06-06 RX ADMIN — OXYCODONE HYDROCHLORIDE AND ACETAMINOPHEN 1 TABLET: 5; 325 TABLET ORAL at 08:14

## 2022-06-06 RX ADMIN — CEPHALEXIN 500 MG: 500 CAPSULE ORAL at 08:14

## 2022-06-06 NOTE — DISCHARGE INSTRUCTIONS
Please take medications as instructed.  Please ice foot as discussed.  Please follow-up with both your primary care physician and with podiatry if you are not better in 3 to 4 days.

## 2022-06-10 DIAGNOSIS — E55.9 VITAMIN D DEFICIENCY: ICD-10-CM

## 2022-06-10 DIAGNOSIS — E11.9 TYPE 2 DIABETES MELLITUS WITHOUT COMPLICATION, WITH LONG-TERM CURRENT USE OF INSULIN: ICD-10-CM

## 2022-06-10 DIAGNOSIS — E78.5 HYPERLIPIDEMIA, UNSPECIFIED HYPERLIPIDEMIA TYPE: ICD-10-CM

## 2022-06-10 DIAGNOSIS — E05.90 HYPERTHYROIDISM: Primary | ICD-10-CM

## 2022-06-10 DIAGNOSIS — Z79.4 TYPE 2 DIABETES MELLITUS WITHOUT COMPLICATION, WITH LONG-TERM CURRENT USE OF INSULIN: ICD-10-CM

## 2022-06-11 LAB
25(OH)D3+25(OH)D2 SERPL-MCNC: 51.7 NG/ML (ref 30–100)
ALBUMIN SERPL-MCNC: 4.1 G/DL (ref 3.8–4.8)
ALBUMIN/GLOB SERPL: 1.4 {RATIO} (ref 1.2–2.2)
ALP SERPL-CCNC: 61 IU/L (ref 44–121)
ALT SERPL-CCNC: 11 IU/L (ref 0–32)
AST SERPL-CCNC: 11 IU/L (ref 0–40)
BASOPHILS # BLD AUTO: 0 X10E3/UL (ref 0–0.2)
BASOPHILS NFR BLD AUTO: 1 %
BILIRUB SERPL-MCNC: <0.2 MG/DL (ref 0–1.2)
BUN SERPL-MCNC: 48 MG/DL (ref 8–27)
BUN/CREAT SERPL: 34 (ref 12–28)
CALCIUM SERPL-MCNC: 10.5 MG/DL (ref 8.7–10.3)
CHLORIDE SERPL-SCNC: 108 MMOL/L (ref 96–106)
CHOLEST SERPL-MCNC: 110 MG/DL (ref 100–199)
CO2 SERPL-SCNC: 20 MMOL/L (ref 20–29)
CREAT SERPL-MCNC: 1.42 MG/DL (ref 0.57–1)
EGFRCR SERPLBLD CKD-EPI 2021: 40 ML/MIN/1.73
EOSINOPHIL # BLD AUTO: 0.2 X10E3/UL (ref 0–0.4)
EOSINOPHIL NFR BLD AUTO: 2 %
ERYTHROCYTE [DISTWIDTH] IN BLOOD BY AUTOMATED COUNT: 13.1 % (ref 11.7–15.4)
GLOBULIN SER CALC-MCNC: 2.9 G/DL (ref 1.5–4.5)
GLUCOSE SERPL-MCNC: 45 MG/DL (ref 65–99)
HBA1C MFR BLD: 7.1 % (ref 4.8–5.6)
HCT VFR BLD AUTO: 32.7 % (ref 34–46.6)
HDLC SERPL-MCNC: 61 MG/DL
HGB BLD-MCNC: 10.6 G/DL (ref 11.1–15.9)
IMM GRANULOCYTES # BLD AUTO: 0 X10E3/UL (ref 0–0.1)
IMM GRANULOCYTES NFR BLD AUTO: 0 %
IMP & REVIEW OF LAB RESULTS: NORMAL
LDLC SERPL CALC-MCNC: 35 MG/DL (ref 0–99)
LYMPHOCYTES # BLD AUTO: 2.1 X10E3/UL (ref 0.7–3.1)
LYMPHOCYTES NFR BLD AUTO: 30 %
MCH RBC QN AUTO: 31 PG (ref 26.6–33)
MCHC RBC AUTO-ENTMCNC: 32.4 G/DL (ref 31.5–35.7)
MCV RBC AUTO: 96 FL (ref 79–97)
MONOCYTES # BLD AUTO: 0.6 X10E3/UL (ref 0.1–0.9)
MONOCYTES NFR BLD AUTO: 8 %
NEUTROPHILS # BLD AUTO: 4.3 X10E3/UL (ref 1.4–7)
NEUTROPHILS NFR BLD AUTO: 59 %
PLATELET # BLD AUTO: 262 X10E3/UL (ref 150–450)
POTASSIUM SERPL-SCNC: 4.8 MMOL/L (ref 3.5–5.2)
PROT SERPL-MCNC: 7 G/DL (ref 6–8.5)
RBC # BLD AUTO: 3.42 X10E6/UL (ref 3.77–5.28)
REPORT: NORMAL
SODIUM SERPL-SCNC: 144 MMOL/L (ref 134–144)
T3FREE SERPL-MCNC: 2.2 PG/ML (ref 2–4.4)
T4 FREE SERPL-MCNC: 1.17 NG/DL (ref 0.82–1.77)
TRIGL SERPL-MCNC: 61 MG/DL (ref 0–149)
TSH SERPL DL<=0.005 MIU/L-ACNC: 1.5 UIU/ML (ref 0.45–4.5)
VLDLC SERPL CALC-MCNC: 14 MG/DL (ref 5–40)
WBC # BLD AUTO: 7.2 X10E3/UL (ref 3.4–10.8)

## 2022-06-15 NOTE — PROGRESS NOTES
Otf Gavin is a 71 y.o. female.     F/u for hyperthyroidism, dm 2/ testing bs 1 x day / last dm eye exam 1 yr ago / last dm foot exam today with dr Rausch         She was found to be hyperthyroid in 2015 by Dr. Landry.  She was started on antithyroid medication and is on methimazole 5 mg once a day.  She has  intentionally lost 13 pounds since September 2021..  She denies tremors.  She denies heat or cold intolerance.  She denies bowel changes.  TSH done in June 2022 is normal at 1.5 with a normal free T4 at 1.17 ng per DL.     She has hypertension and paroxysmal atrial fibrillation.  She has no history of heart attack back or stroke.  She denies chest pain or palpitations.  She is on metoprolol tartrate 50 mg twice a day, furosemide 20 mg once a day and Xarelto 20 mg once a day.  She follows with Dr. Ramos.     She has known diabetes mellitus since 2000 and started on insulin in 2001.  She is on Januvia  50 mg and glyburide/metformin 5/500 2 tablets twice a day. She has stopped using Lantus 60 units daily in April 2022 because of recurrent hypoglycemia.  She will have to stop Januvia in June 2022 because of high cost while at the [facility].  She checks her blood sugars at least once a day.  Fasting glucose are in the 200s.    Lunchtime glucose 120-220.  Suppertime glucose 140.  She has not used GLP-1 agonist. She denies early morning hypoglycemia.       Her last eye examination was in 2021.  She has no known retinopathy.  Urine microalbumin was normal in September 2021.  Serum creatinine has risen to 1.42 mg per DL in June 2022.  She has been taking naproxen for years.  She has occasional tingling in both hands but no pain.     She has hyperlipidemia and is on Lipitor 20 mg/day.  She denies myalgia.  Lipid panel done in June 2022 are as follows: Cholesterol 110.  HDL 61.  LDL 35.  Triglycerides 61.     She has sleep apnea and uses a CPAP regularly.     She had a total hysterectomy and  "bilateral salpingo-oophorectomy at age 45 for heavy vaginal bleeding she was not placed on estrogen replacement therapy.  She denies alcohol or tobacco abuse.  Her mother had a hip fracture in her 50's.     Bone density done in December 2021 showed osteopenia of the left hip.  Her 10-year probability of major osteoporotic fracture is 15% and of hip fracture is 2.8%.  She is on calcium plus D 1 tablet twice a day and vitamin D3 2000 units/day.  25 hydroxy vitamin D done in June 2022 is normal at 51.7 ng/mL.    She has chronic mild anemia since 2015.  She had a normal colonoscopy except for diverticulosis done by Dr. Traore in April 2022.  She denies melena, hematochezia, or hematuria.  She has chronic diarrhea.    She has degenerative arthritis and has been on naproxen 500 mg twice daily for several years.    She has acid reflux disease and has been taking omeprazole 40 mg daily.  She has recurrence of symptoms whenever she goes off omeprazole.      The following portions of the patient's history were reviewed and updated as appropriate: allergies, current medications, past family history, past medical history, past social history, past surgical history and problem list.    Review of Systems   Respiratory: Negative for shortness of breath.    Cardiovascular: Negative for chest pain and palpitations.   Gastrointestinal: Positive for abdominal pain and diarrhea. Negative for anal bleeding, blood in stool and nausea.   Musculoskeletal: Negative for myalgias.   Neurological: Negative for numbness.     Vitals:    06/16/22 1442   BP: 122/72   Pulse: 98   Temp: 98.9 °F (37.2 °C)   SpO2: 99%   Weight: 94.5 kg (208 lb 6.4 oz)   Height: 160 cm (62.99\")      Objective   Physical Exam  Constitutional:       General: She is not in acute distress.     Appearance: Normal appearance. She is obese. She is not ill-appearing, toxic-appearing or diaphoretic.   Eyes:      General: No scleral icterus.        Right eye: No discharge.   "       Left eye: No discharge.   Neck:      Vascular: No carotid bruit.   Cardiovascular:      Rate and Rhythm: Normal rate and regular rhythm.      Heart sounds: Normal heart sounds.   Pulmonary:      Effort: Pulmonary effort is normal.      Breath sounds: Normal breath sounds.   Abdominal:      General: Bowel sounds are normal.      Palpations: Abdomen is soft.      Tenderness: There is no right CVA tenderness or left CVA tenderness.   Musculoskeletal:         General: Normal range of motion.      Cervical back: Normal range of motion.   Lymphadenopathy:      Cervical: No cervical adenopathy.   Skin:     General: Skin is warm.   Neurological:      Mental Status: She is alert and oriented to person, place, and time.      Comments: Intact light touch in lower extremities       Orders Only on 06/10/2022   Component Date Value Ref Range Status   • 25 Hydroxy, Vitamin D 06/10/2022 51.7  30.0 - 100.0 ng/mL Final    Comment: Vitamin D deficiency has been defined by the [place] of  [employer] and an Endocrine Society practice guideline as a  level of serum 25-OH vitamin D less than 20 ng/mL (1,2).  The Endocrine Society went on to further define vitamin D  insufficiency as a level between 21 and 29 ng/mL (2).  1. IOM ([place] [employer]). 2010. Dietary reference     intakes for calcium and D. [place]: The     National Academies Press.  2. Artur MF, Carina NC, Marianela MORSE, et al.     Evaluation, treatment, and prevention of vitamin D     deficiency: an Endocrine Society clinical practice     guideline. JCEM. 2011 Jul; 96(7):1911-30.     • Total Cholesterol 06/10/2022 110  100 - 199 mg/dL Final   • Triglycerides 06/10/2022 61  0 - 149 mg/dL Final   • HDL Cholesterol 06/10/2022 61  >39 mg/dL Final   • VLDL Cholesterol Issa 06/10/2022 14  5 - 40 mg/dL Final   • LDL Chol Calc ([facility]) 06/10/2022 35  0 - 99 mg/dL Final   • Free T4 06/10/2022 1.17  0.82 - 1.77 ng/dL Final   • TSH 06/10/2022 1.500  0.450 - 4.500  uIU/mL Final   • T3, Free 06/10/2022 2.2  2.0 - 4.4 pg/mL Final   • Hemoglobin A1C 06/10/2022 7.1 (A) 4.8 - 5.6 % Final    Comment:          Prediabetes: 5.7 - 6.4           Diabetes: >6.4           Glycemic control for adults with diabetes: <7.0     • Glucose 06/10/2022 45 (A) 65 - 99 mg/dL Final   • BUN 06/10/2022 48 (A) 8 - 27 mg/dL Final   • Creatinine 06/10/2022 1.42 (A) 0.57 - 1.00 mg/dL Final   • EGFR Result 06/10/2022 40 (A) >59 mL/min/1.73 Final   • BUN/Creatinine Ratio 06/10/2022 34 (A) 12 - 28 Final   • Sodium 06/10/2022 144  134 - 144 mmol/L Final   • Potassium 06/10/2022 4.8  3.5 - 5.2 mmol/L Final   • Chloride 06/10/2022 108 (A) 96 - 106 mmol/L Final   • Total CO2 06/10/2022 20  20 - 29 mmol/L Final   • Calcium 06/10/2022 10.5 (A) 8.7 - 10.3 mg/dL Final   • Total Protein 06/10/2022 7.0  6.0 - 8.5 g/dL Final   • Albumin 06/10/2022 4.1  3.8 - 4.8 g/dL Final   • Globulin 06/10/2022 2.9  1.5 - 4.5 g/dL Final   • A/G Ratio 06/10/2022 1.4  1.2 - 2.2 Final   • Total Bilirubin 06/10/2022 <0.2  0.0 - 1.2 mg/dL Final   • Alkaline Phosphatase 06/10/2022 61  44 - 121 IU/L Final   • AST (SGOT) 06/10/2022 11  0 - 40 IU/L Final   • ALT (SGPT) 06/10/2022 11  0 - 32 IU/L Final   • WBC 06/10/2022 7.2  3.4 - 10.8 x10E3/uL Final   • RBC 06/10/2022 3.42 (A) 3.77 - 5.28 x10E6/uL Final   • Hemoglobin 06/10/2022 10.6 (A) 11.1 - 15.9 g/dL Final   • Hematocrit 06/10/2022 32.7 (A) 34.0 - 46.6 % Final   • MCV 06/10/2022 96  79 - 97 fL Final   • MCH 06/10/2022 31.0  26.6 - 33.0 pg Final   • MCHC 06/10/2022 32.4  31.5 - 35.7 g/dL Final   • RDW 06/10/2022 13.1  11.7 - 15.4 % Final   • Platelets 06/10/2022 262  150 - 450 x10E3/uL Final   • Neutrophil Rel % 06/10/2022 59  Not Estab. % Final   • Lymphocyte Rel % 06/10/2022 30  Not Estab. % Final   • Monocyte Rel % 06/10/2022 8  Not Estab. % Final   • Eosinophil Rel % 06/10/2022 2  Not Estab. % Final   • Basophil Rel % 06/10/2022 1  Not Estab. % Final   • Neutrophils Absolute 06/10/2022  4.3  1.4 - 7.0 x10E3/uL Final   • Lymphocytes Absolute 06/10/2022 2.1  0.7 - 3.1 x10E3/uL Final   • Monocytes Absolute 06/10/2022 0.6  0.1 - 0.9 x10E3/uL Final   • Eosinophils Absolute 06/10/2022 0.2  0.0 - 0.4 x10E3/uL Final   • Basophils Absolute 06/10/2022 0.0  0.0 - 0.2 x10E3/uL Final   • Immature Granulocyte Rel % 06/10/2022 0  Not Estab. % Final   • Immature Grans Absolute 06/10/2022 0.0  0.0 - 0.1 x10E3/uL Final   • Interpretation 06/10/2022 Note   Final    Supplemental report is available.   • Interpretation 06/10/2022 Note   Final    Comment: Medical Director's Note: Effective 2/28/2022, [facility] uses  the 2021 CKD-EPI creatinine equation without a race factor  to calculate and report eGFR results. eGFR results reported  prior to this date using the 2009 CKD-EPI equation are no  longer included in this report interpretation.  -------------------------------  CHRONIC KIDNEY DISEASE:  EGFR, BLOOD PRESSURE, AND PROTEINURIA ASSESSMENT  We presume eGFR has been less than 60 mL/min/1.73mE2 on at  least two occasions spaced at least 3 months apart. Current  eGFR is 40 mL/min/1.73mE2 corresponding to CKD stage 3b.  Potassium is within goal and has not changed significantly,  was 5.1 and now is 4.8 mmol/L. Glycemic control (HB A1c: 7.1  risk for hypoglycemia.  EGFR, BLOOD PRESSURE, AND PROTEINURIA TREATMENT SUGGESTIONS  -  Guidelines recommend a target blood pressure of 120/80 mmHg  or less in CKD patients to reduce cardiovascular risk and  CKD progression. Assessment of albuminuria (urine  albumin:creatinine ratio or urine protein:c                           reatinine ratio  preferred) is recommended at least annually in CKD patients  for staging and disease prognosis.  EGFR, BLOOD PRESSURE, AND PROTEINURIA FOLLOW-UP  -  Spot Urine Panel is recommended by guidelines, at least  yearly; Hemoglobin A1C within 3 months; fasting Renal Panel  within 1 month;  BONE and MINERAL ASSESSMENT  Calcium is above goal and has  risen, was 9.3 and now is 10.5  mg/dL. Carbon Dioxide is below goal and has not changed  significantly, was 20 and now is 20 mmol/L. Vitamin D is  adequate (was 21.3 and now is 51.7 ng/mL).  BONE and MINERAL TREATMENT SUGGESTIONS  -  Interpretations require simultaneous measurements of serum  calcium and phosphorus. If not on alkali, begin sodium  bicarbonate, one 650 mg pill 2-3 times daily, otherwise  increase dose. Stop calcium supplements if in use.  BONE and MINERAL FOLLOW-UP  -  25-Hydroxy Vitamin D within 12 months; fasting Renal Panel  within 1 month; fasting PTH with Renal Panel is recommended  by guidelines, at least yearl                           y;  LIPIDS ASSESSMENT  LDL-C is optimal and has not changed significantly, was 42  and now is 35 mg/dL. Triglyceride is normal and has  decreased, was 141 and now is 61 mg/dL. Non-HDL Cholesterol  is optimal and has decreased, was 66 and now is 49 mg/dL.  HDL-C is high and has risen, was 53 and now is 61 mg/dL.  LIPIDS TREATMENT SUGGESTIONS  -  Therapeutic lifestyle changes are always valuable to  maintain optimal blood lipid status (diet, exercise, weight  management). Continue statin if in use. Consider measurement  of LDL particle number or Apo B to adjudicate need for  further LDL lowering therapy. If statin cannot be tolerated  or increased, alternatives include use of an intestinal  agent (ezetimibe or bile acid sequestrant) or niacin.  LIPIDS FOLLOW-UP  -  fasting Lipid Panel within 12 months;  ANEMIA ASSESSMENT  Hemoglobin is low, 10.6 g/dL. Hemoglobin target assumes JOHN  is not in use.  ANEMIA TREATMENT SUGGESTIONS  -  Iron deficiency is a common cause of anemia in CKD.  Recom                           mend measurement of Ferritin and TSAT.  ANEMIA FOLLOW-UP  -  CBC within 3 months; Fe/TIBC (TSAT) and Ferritin with CBC is  due;  -------------------------------  DISCLAIMER  These assessments and treatment suggestions are provided as  a convenience in  support of the physician-patient  relationship and are not intended to replace the physician's  clinical judgment. They are derived from national guidelines  in addition to other evidence and expert opinion. The  clinician should consider this information within the  context of clinical opinion and the individual patient.  SEE GUIDANCE FOR CHRONIC KIDNEY DISEASE PROGRAM: Kidney  Disease Improving Global Outcomes (KDIGO) clinical practice  guidelines are at http://kdigo.org/home/guidelines/.  National Kidney Foundation Kidney Disease Outcomes Quality  Initiative (KDOQI (TM)), with its limitations and  disclaimers, are at www.kidney.org/professionals/KDOQI. This  program is intended for patients who have been diagnosed  with stages 3, 4, or                            pre-dialysis 5 CKD. It is not intended  for children, pregnant patients, or transplant patients.       Assessment & Plan   Diagnoses and all orders for this visit:    1. Hyperthyroidism (Primary)    2. Hyperlipidemia, unspecified hyperlipidemia type  -     Comprehensive Metabolic Panel    3. KRSITIN on CPAP    4. Type 2 diabetes mellitus without complication, with long-term current use of insulin (HCC)  -     Protein / Creatinine Ratio, Urine - Urine, Clean Catch  -     DEBORAH + PE    5. Osteopenia of neck of left femur    6. Chronic anemia  -     CBC & Differential  -     Iron Profile  -     Ferritin  -     Hemoglobinopathy Fractionation Cascade  -     Vitamin B12  -     Folate  -     Haptoglobin  -     Reticulocytes    Other orders  -     Calcium Carbonate+Vitamin D (RA Calcium Plus Vitamin D) 600-200 MG-UNIT tablet; Take 1 tablet by mouth 2 (Two) Times a Day.      Continue methimazole 5 mg daily.  Continue metoprolol tartrate, furosemide and Xarelto.  Will defer blood pressure control to Dr. Ramos.  Continue Januvia 50 mg/day and glyburide/metformin.  When she runs out of Januvia, may restart Lantus 20 units daily.  Discontinue naproxen because of increased  serum creatinine.  Check protein to creatinine ratio.  Appointment with Dr. Milligan.  Continue Lipitor 20 mg/day.  Continue CPAP.  Continue calcium and vitamin D supplements.    Copy of my note sent to Edith Chávez and Dr. Ramos.    RTC 4 mos.

## 2022-06-16 ENCOUNTER — OFFICE VISIT (OUTPATIENT)
Dept: ENDOCRINOLOGY | Age: 72
End: 2022-06-16

## 2022-06-16 VITALS
HEIGHT: 63 IN | SYSTOLIC BLOOD PRESSURE: 122 MMHG | HEART RATE: 98 BPM | OXYGEN SATURATION: 99 % | DIASTOLIC BLOOD PRESSURE: 72 MMHG | BODY MASS INDEX: 36.93 KG/M2 | TEMPERATURE: 98.9 F | WEIGHT: 208.4 LBS

## 2022-06-16 DIAGNOSIS — E78.5 HYPERLIPIDEMIA, UNSPECIFIED HYPERLIPIDEMIA TYPE: ICD-10-CM

## 2022-06-16 DIAGNOSIS — Z99.89 OSA ON CPAP: ICD-10-CM

## 2022-06-16 DIAGNOSIS — D64.9 CHRONIC ANEMIA: ICD-10-CM

## 2022-06-16 DIAGNOSIS — Z79.4 TYPE 2 DIABETES MELLITUS WITHOUT COMPLICATION, WITH LONG-TERM CURRENT USE OF INSULIN: ICD-10-CM

## 2022-06-16 DIAGNOSIS — E11.9 TYPE 2 DIABETES MELLITUS WITHOUT COMPLICATION, WITH LONG-TERM CURRENT USE OF INSULIN: ICD-10-CM

## 2022-06-16 DIAGNOSIS — G47.33 OSA ON CPAP: ICD-10-CM

## 2022-06-16 DIAGNOSIS — M85.852 OSTEOPENIA OF NECK OF LEFT FEMUR: ICD-10-CM

## 2022-06-16 DIAGNOSIS — E05.90 HYPERTHYROIDISM: Primary | Chronic | ICD-10-CM

## 2022-06-16 PROCEDURE — 99214 OFFICE O/P EST MOD 30 MIN: CPT | Performed by: INTERNAL MEDICINE

## 2022-06-17 LAB
ALBUMIN SERPL ELPH-MCNC: 3.3 G/DL (ref 2.9–4.4)
ALBUMIN SERPL-MCNC: 3.9 G/DL (ref 3.8–4.8)
ALBUMIN/GLOB SERPL: 1 {RATIO} (ref 0.7–1.7)
ALBUMIN/GLOB SERPL: 1.4 {RATIO} (ref 1.2–2.2)
ALP SERPL-CCNC: 64 IU/L (ref 44–121)
ALPHA1 GLOB SERPL ELPH-MCNC: 0.3 G/DL (ref 0–0.4)
ALPHA2 GLOB SERPL ELPH-MCNC: 1.2 G/DL (ref 0.4–1)
ALT SERPL-CCNC: 19 IU/L (ref 0–32)
AST SERPL-CCNC: 22 IU/L (ref 0–40)
B-GLOBULIN SERPL ELPH-MCNC: 1.1 G/DL (ref 0.7–1.3)
BASOPHILS # BLD AUTO: 0 X10E3/UL (ref 0–0.2)
BASOPHILS NFR BLD AUTO: 1 %
BILIRUB SERPL-MCNC: <0.2 MG/DL (ref 0–1.2)
BUN SERPL-MCNC: 26 MG/DL (ref 8–27)
BUN/CREAT SERPL: 19 (ref 12–28)
CALCIUM SERPL-MCNC: 9.4 MG/DL (ref 8.7–10.3)
CHLORIDE SERPL-SCNC: 105 MMOL/L (ref 96–106)
CO2 SERPL-SCNC: 20 MMOL/L (ref 20–29)
CREAT SERPL-MCNC: 1.4 MG/DL (ref 0.57–1)
CREAT UR-MCNC: 29.2 MG/DL
EGFRCR SERPLBLD CKD-EPI 2021: 40 ML/MIN/1.73
EOSINOPHIL # BLD AUTO: 0.2 X10E3/UL (ref 0–0.4)
EOSINOPHIL NFR BLD AUTO: 4 %
ERYTHROCYTE [DISTWIDTH] IN BLOOD BY AUTOMATED COUNT: 13.2 % (ref 11.7–15.4)
FERRITIN SERPL-MCNC: 57 NG/ML (ref 15–150)
FOLATE SERPL-MCNC: 7.7 NG/ML
GAMMA GLOB SERPL ELPH-MCNC: 0.8 G/DL (ref 0.4–1.8)
GLOBULIN SER CALC-MCNC: 2.8 G/DL (ref 1.5–4.5)
GLOBULIN SER-MCNC: 3.4 G/DL (ref 2.2–3.9)
GLUCOSE SERPL-MCNC: 111 MG/DL (ref 65–99)
HAPTOGLOB SERPL-MCNC: 362 MG/DL (ref 37–355)
HCT VFR BLD AUTO: 31.7 % (ref 34–46.6)
HGB A MFR BLD ELPH: 97.8 % (ref 96.4–98.8)
HGB A2 MFR BLD ELPH: 2.2 % (ref 1.8–3.2)
HGB BLD-MCNC: 10.2 G/DL (ref 11.1–15.9)
HGB F MFR BLD ELPH: 0 % (ref 0–2)
HGB FRACT BLD-IMP: NORMAL
HGB S MFR BLD ELPH: 0 %
IGA SERPL-MCNC: 265 MG/DL (ref 87–352)
IGG SERPL-MCNC: 875 MG/DL (ref 586–1602)
IGM SERPL-MCNC: 51 MG/DL (ref 26–217)
IMM GRANULOCYTES # BLD AUTO: 0 X10E3/UL (ref 0–0.1)
IMM GRANULOCYTES NFR BLD AUTO: 1 %
INTERPRETATION SERPL IEP-IMP: ABNORMAL
IRON SATN MFR SERPL: 5 % (ref 15–55)
IRON SERPL-MCNC: 17 UG/DL (ref 27–139)
LABORATORY COMMENT REPORT: ABNORMAL
LYMPHOCYTES # BLD AUTO: 1.3 X10E3/UL (ref 0.7–3.1)
LYMPHOCYTES NFR BLD AUTO: 27 %
M PROTEIN SERPL ELPH-MCNC: ABNORMAL G/DL
MCH RBC QN AUTO: 30.2 PG (ref 26.6–33)
MCHC RBC AUTO-ENTMCNC: 32.2 G/DL (ref 31.5–35.7)
MCV RBC AUTO: 94 FL (ref 79–97)
MONOCYTES # BLD AUTO: 0.5 X10E3/UL (ref 0.1–0.9)
MONOCYTES NFR BLD AUTO: 10 %
NEUTROPHILS # BLD AUTO: 2.9 X10E3/UL (ref 1.4–7)
NEUTROPHILS NFR BLD AUTO: 57 %
PLATELET # BLD AUTO: 222 X10E3/UL (ref 150–450)
POTASSIUM SERPL-SCNC: 4.4 MMOL/L (ref 3.5–5.2)
PROT SERPL-MCNC: 6.7 G/DL (ref 6–8.5)
PROT UR-MCNC: <4 MG/DL
PROT/CREAT UR: ABNORMAL MG/G CREAT (ref 0–200)
RBC # BLD AUTO: 3.38 X10E6/UL (ref 3.77–5.28)
REPORT: NORMAL
RETICS/RBC NFR AUTO: 1.7 % (ref 0.6–2.6)
SODIUM SERPL-SCNC: 143 MMOL/L (ref 134–144)
TIBC SERPL-MCNC: 314 UG/DL (ref 250–450)
UIBC SERPL-MCNC: 297 UG/DL (ref 118–369)
VIT B12 SERPL-MCNC: 260 PG/ML (ref 232–1245)
WBC # BLD AUTO: 5 X10E3/UL (ref 3.4–10.8)

## 2022-06-20 DIAGNOSIS — D64.9 CHRONIC ANEMIA: Primary | ICD-10-CM

## 2022-06-20 RX ORDER — IRON POLYSACCHARIDE COMPLEX 150 MG
150 CAPSULE ORAL DAILY
Qty: 30 CAPSULE | Refills: 2 | Status: SHIPPED | OUTPATIENT
Start: 2022-06-20 | End: 2022-09-06

## 2022-06-20 NOTE — PROGRESS NOTES
Hemoglobin 10.2.  Stable mild anemia.  Serum iron low.  Start Ferrex 150 mg daily.  Prescription sent to pharmacy.  Referral to hematology Dr. Middleton  Haptoglobin mildly elevated.  Normal reticulocytes.  Normal hemoglobin fractionation.  Normal vitamin B12 and folate.  Serum creatinine mildly elevated but stable.  Normal serum protein electrophoresis.  Normal protein to creatinine ratio.

## 2022-07-01 ENCOUNTER — APPOINTMENT (OUTPATIENT)
Dept: SLEEP MEDICINE | Facility: HOSPITAL | Age: 72
End: 2022-07-01

## 2022-07-08 ENCOUNTER — OFFICE VISIT (OUTPATIENT)
Dept: SLEEP MEDICINE | Facility: HOSPITAL | Age: 72
End: 2022-07-08

## 2022-07-08 VITALS
BODY MASS INDEX: 36.5 KG/M2 | HEIGHT: 63 IN | WEIGHT: 206 LBS | DIASTOLIC BLOOD PRESSURE: 68 MMHG | SYSTOLIC BLOOD PRESSURE: 119 MMHG | HEART RATE: 72 BPM | OXYGEN SATURATION: 98 %

## 2022-07-08 DIAGNOSIS — Z99.89 OSA ON CPAP: Primary | ICD-10-CM

## 2022-07-08 DIAGNOSIS — G47.33 OSA ON CPAP: Primary | ICD-10-CM

## 2022-07-08 DIAGNOSIS — E66.9 OBESITY (BMI 30-39.9): ICD-10-CM

## 2022-07-08 PROCEDURE — G0463 HOSPITAL OUTPT CLINIC VISIT: HCPCS

## 2022-07-08 PROCEDURE — 99214 OFFICE O/P EST MOD 30 MIN: CPT | Performed by: FAMILY MEDICINE

## 2022-07-08 NOTE — PROGRESS NOTES
[facility] Note     Chief Complaint:  KRISTIN     Primary Care Physician: Edith Chávez PA    Lisa Gavin is a 71 y.o.female  was last seen at [facility] sleep lab: 6/25/2021.  New patient to me presents today for annual follow-up of KRISTIN.  Patient is on auto CPAP machine.  Home sleep study was reviewed from April 2021 which showed overall AHI of 5.3 events per hour.  Bedtime 930 9:59 PM wake time 5 AM ESS of 13.  Fullface mask which fits well some dry mouth.  Unaware of [facility] recall.  Amenable to registering machine.  Has not used so clean machine.    Results Review:  DME is ever care.  Downloads between 6/8/2022 - 7/7/2022.  Average usage is 7 hours 34 minutes.  Average AHI is 2.7.  Average AutoCPAP pressure is 10.5 cm H2O.    Current Medications:    Current Outpatient Medications:   •  atorvastatin (LIPITOR) 20 MG tablet, 20 mg Every Night., Disp: , Rfl:   •  Calcium Carbonate+Vitamin D (RA Calcium Plus Vitamin D) 600-200 MG-UNIT tablet, Take 1 tablet by mouth 2 (Two) Times a Day., Disp: , Rfl:   •  Cholecalciferol (Vitamin D3) 50 MCG (2000 UT) tablet, 1 tablet daily, Disp: 100 tablet, Rfl: 3  •  citalopram (CeleXA) 20 MG tablet, Take 2 tablets by mouth 2 (two) times a day., Disp: , Rfl:   •  furosemide (LASIX) 40 MG tablet, 40 mg Daily., Disp: , Rfl:   •  glyBURIDE-metFORMIN (GLUCOVANCE) 5-500 MG per tablet, Take 2 tablets by mouth 2 (two) times a day., Disp: , Rfl:   •  Insulin Glargine 100 UNIT/ML solution pen-injector, Inject 60 Units under the skin into the appropriate area as directed Daily., Disp: , Rfl:   •  iron polysaccharides (Ferrex 150) 150 MG capsule, Take 1 capsule by mouth Daily., Disp: 30 capsule, Rfl: 2  •  methIMAzole (TAPAZOLE) 5 MG tablet, TAKE ONE TABLET BY MOUTH DAILY, Disp: 30 tablet, Rfl: 0  •  metoprolol tartrate (LOPRESSOR) 50 MG tablet, 50 mg 2 (Two) Times a Day., Disp: , Rfl:   •  naproxen (NAPROSYN) 500 MG tablet, Take 500 mg by mouth 2 (Two) Times a Day.,  "Disp: , Rfl:   •  omeprazole (priLOSEC) 40 MG capsule, Take 40 mg by mouth Daily., Disp: , Rfl:   •  rivaroxaban (XARELTO) tablet, Take 20 mg by mouth Daily., Disp: , Rfl:   •  SITagliptin (JANUVIA) 50 MG tablet, Take 50 mg by mouth Daily., Disp: , Rfl:   •  traMADol (ULTRAM) 50 MG tablet, Take 1 tablet by mouth Every 6 (Six) Hours As Needed for Moderate Pain ., Disp: 18 tablet, Rfl: 0  •  vitamin E 400 UNIT capsule, Take 400 Units by mouth Daily., Disp: , Rfl:    also entered in Sleep Questionnaire    Patient  has a past medical history of A-fib (HCC), Arthritis, Depression, Disease of thyroid gland, GERD (gastroesophageal reflux disease), Headache, Sleep apnea, and Type 2 diabetes mellitus (HCC).    Social History:    Social History     Socioeconomic History   • Marital status:    Tobacco Use   • Smoking status: Never Smoker   • Smokeless tobacco: Never Used   Vaping Use   • Vaping Use: Never used   Substance and Sexual Activity   • Alcohol use: No   • Drug use: No   • Sexual activity: Defer       Allergies:  Patient has no known allergies.    Vital Signs:    Vitals:    07/08/22 0700   BP: 119/68   Pulse: 72   SpO2: 98%   Weight: 93.4 kg (206 lb)   Height: 160 cm (63\")     Body mass index is 36.49 kg/m².    REVIEW OF SYSTEMS.  Full review of systems available on the intake form which is scanned in the media tab.  The relevant positive are noted below  1. Daytime excessive sleepiness with [place] Sleepiness Scale :Total score: 13   2. Snoring  3. All negative      Physical exam:  Vitals:    07/08/22 0700   BP: 119/68   Pulse: 72   SpO2: 98%   Weight: 93.4 kg (206 lb)   Height: 160 cm (63\")    Body mass index is 36.49 kg/m².    HEENT: Head is atraumatic, normocephalic  Eyes: pupils are round equal and reacting to light and accommodation, conjunctiva normal  RESPIRATORY SYSTEM: Regular respirations  CARDIOVASULAR SYSTEM: Regular rate  EXTREMITES: No cyanosis, clubbing  NEUROLOGICAL SYSTEM: Oriented x 3, no " gross motor defects, gait normal    Impression:  1. KRISTIN on CPAP    2. Obesity (BMI 30-39.9)        Obstructive sleep apnea adequately treated with auto CPAP 8-16 cm H2O with good compliance and usage and no complaints of hypersomnolence.  Given information on registering machine with [facility] for replacement.  Return to clinic in 1 year for follow-up or sooner if needed.    Patient uses the CPAP device and benefits from its use in terms of reduction of hypersomnia and snoring.Weight loss will be strongly beneficial to reduce the severity of sleep-disordered breathing.  Caution during activities that require prolonged concentration is strongly advised if sleepiness returns. Changing of PAP supplies regularly is important for effective use. Patient needs to change cushion on the mask or plugs on nasal pillows along with disposable filters once every month and change mask frame, tubing, headgear and Velcro straps every 6 months at the minimum.    Aranza Banda MD  Sleep Medicine  07/08/22  08:40 EDT

## 2022-07-20 ENCOUNTER — LAB (OUTPATIENT)
Dept: LAB | Facility: HOSPITAL | Age: 72
End: 2022-07-20

## 2022-07-20 ENCOUNTER — CONSULT (OUTPATIENT)
Dept: ONCOLOGY | Facility: CLINIC | Age: 72
End: 2022-07-20

## 2022-07-20 ENCOUNTER — TELEPHONE (OUTPATIENT)
Dept: ONCOLOGY | Facility: CLINIC | Age: 72
End: 2022-07-20

## 2022-07-20 VITALS
WEIGHT: 205 LBS | BODY MASS INDEX: 36.32 KG/M2 | DIASTOLIC BLOOD PRESSURE: 77 MMHG | SYSTOLIC BLOOD PRESSURE: 138 MMHG | TEMPERATURE: 98.7 F | HEART RATE: 82 BPM | HEIGHT: 63 IN | OXYGEN SATURATION: 95 % | RESPIRATION RATE: 16 BRPM

## 2022-07-20 DIAGNOSIS — N18.31 ANEMIA IN STAGE 3A CHRONIC KIDNEY DISEASE: Primary | ICD-10-CM

## 2022-07-20 DIAGNOSIS — D63.1 ANEMIA IN STAGE 3A CHRONIC KIDNEY DISEASE: Primary | ICD-10-CM

## 2022-07-20 DIAGNOSIS — D64.9 ANEMIA, UNSPECIFIED TYPE: Primary | ICD-10-CM

## 2022-07-20 DIAGNOSIS — E53.8 LOW SERUM VITAMIN B12: Primary | ICD-10-CM

## 2022-07-20 PROBLEM — N18.30 ANEMIA IN STAGE 3 CHRONIC KIDNEY DISEASE: Status: ACTIVE | Noted: 2022-07-20

## 2022-07-20 LAB
BASOPHILS # BLD AUTO: 0.05 10*3/MM3 (ref 0–0.2)
BASOPHILS NFR BLD AUTO: 0.8 % (ref 0–1.5)
DEPRECATED RDW RBC AUTO: 47.5 FL (ref 37–54)
EOSINOPHIL # BLD AUTO: 0.3 10*3/MM3 (ref 0–0.4)
EOSINOPHIL NFR BLD AUTO: 4.9 % (ref 0.3–6.2)
ERYTHROCYTE [DISTWIDTH] IN BLOOD BY AUTOMATED COUNT: 14.2 % (ref 12.3–15.4)
HCT VFR BLD AUTO: 31.6 % (ref 34–46.6)
HGB BLD-MCNC: 10.4 G/DL (ref 12–15.9)
IMM GRANULOCYTES # BLD AUTO: 0.03 10*3/MM3 (ref 0–0.05)
IMM GRANULOCYTES NFR BLD AUTO: 0.5 % (ref 0–0.5)
LYMPHOCYTES # BLD AUTO: 1.42 10*3/MM3 (ref 0.7–3.1)
LYMPHOCYTES NFR BLD AUTO: 23 % (ref 19.6–45.3)
MCH RBC QN AUTO: 30.1 PG (ref 26.6–33)
MCHC RBC AUTO-ENTMCNC: 32.9 G/DL (ref 31.5–35.7)
MCV RBC AUTO: 91.6 FL (ref 79–97)
MONOCYTES # BLD AUTO: 0.57 10*3/MM3 (ref 0.1–0.9)
MONOCYTES NFR BLD AUTO: 9.2 % (ref 5–12)
NEUTROPHILS NFR BLD AUTO: 3.81 10*3/MM3 (ref 1.7–7)
NEUTROPHILS NFR BLD AUTO: 61.6 % (ref 42.7–76)
NRBC BLD AUTO-RTO: 0 /100 WBC (ref 0–0.2)
PLATELET # BLD AUTO: 254 10*3/MM3 (ref 140–450)
PMV BLD AUTO: 11.5 FL (ref 6–12)
RBC # BLD AUTO: 3.45 10*6/MM3 (ref 3.77–5.28)
WBC NRBC COR # BLD: 6.18 10*3/MM3 (ref 3.4–10.8)

## 2022-07-20 PROCEDURE — 85025 COMPLETE CBC W/AUTO DIFF WBC: CPT

## 2022-07-20 PROCEDURE — 99204 OFFICE O/P NEW MOD 45 MIN: CPT | Performed by: INTERNAL MEDICINE

## 2022-07-20 RX ORDER — CHOLECALCIFEROL (VITAMIN D3) 125 MCG
500 CAPSULE ORAL DAILY
Qty: 30 TABLET | Refills: 3 | Status: SHIPPED | OUTPATIENT
Start: 2022-07-20

## 2022-07-20 NOTE — PROGRESS NOTES
Subjective     REASON FOR CONSULTATION: Anemia  Provide an opinion on any further workup or treatment                             REQUESTING PHYSICIAN: Shalom    RECORDS OBTAINED:  Records of the patients history including those obtained from the referring provider were reviewed and summarized in detail.    HISTORY OF PRESENT ILLNESS:  The patient is a 71 y.o. year old female who is here for an opinion about the above issue.    History of Present Illness   This is a very pleasant 71-year-old lady with type 2 diabetes mellitus, CKD, arthritis on NSAIDs, atrial fibrillation on anticoagulation with Xarelto referred for evaluation of anemia.  Reviewing the labs available, the patient has had a stable anemia since at least October 2019 with hemoglobin running between 10.2-10.6.  She has persistently normal white blood cell count with differential and normal platelet count.  Additional recent evaluation by endocrinology showed a low iron saturation 5% with ferritin 57, B12 260, folate 7.7, nonsuppressed haptoglobin, negative SPEP/DEBORAH for monoclonality.  The patient denies need of blood transfusions.  She denies significant fatigue, lightheadedness, dizziness or shortness of breath.  She denies blood in the stool and reports recent negative colonoscopy.  She has not had a EGD but denies dyspepsia, abdominal pain or melanotic stool.    Past Medical History:   Diagnosis Date   • A-fib (HCC)    • Arthritis    • Depression    • Disease of thyroid gland    • GERD (gastroesophageal reflux disease)    • Headache    • Sleep apnea    • Type 2 diabetes mellitus (HCC)         Past Surgical History:   Procedure Laterality Date   • CHOLECYSTECTOMY     • COLONOSCOPY N/A 4/29/2022    Procedure: COLONOSCOPY;  Surgeon: Parmjit Traore MD;  Location: [facility];  Service: Gastroenterology;  Laterality: N/A;  Diverticulosis   • HYSTERECTOMY  1996    bilateral oophorectomy for heavy bleeding. No HRT   • ROTATOR CUFF REPAIR       ACUTE   • SHOULDER SURGERY Bilateral     hAS HAD ROTATOR CUFF SURGERY ON BOTH SHOULDERS        Current Outpatient Medications on File Prior to Visit   Medication Sig Dispense Refill   • atorvastatin (LIPITOR) 20 MG tablet 20 mg Every Night.     • Calcium Carbonate+Vitamin D (RA Calcium Plus Vitamin D) 600-200 MG-UNIT tablet Take 1 tablet by mouth 2 (Two) Times a Day.     • Cholecalciferol (Vitamin D3) 50 MCG (2000 UT) tablet 1 tablet daily 100 tablet 3   • citalopram (CeleXA) 20 MG tablet Take 2 tablets by mouth 2 (two) times a day.     • Diclofenac Sodium (VOLTAREN) 1 % gel gel As Needed. 1% gel     • furosemide (LASIX) 40 MG tablet 40 mg Daily.     • glyBURIDE-metFORMIN (GLUCOVANCE) 5-500 MG per tablet Take 2 tablets by mouth 2 (two) times a day.     • Insulin Glargine 100 UNIT/ML solution pen-injector Inject 60 Units under the skin into the appropriate area as directed Daily.     • iron polysaccharides (Ferrex 150) 150 MG capsule Take 1 capsule by mouth Daily. 30 capsule 2   • methIMAzole (TAPAZOLE) 5 MG tablet TAKE ONE TABLET BY MOUTH DAILY 30 tablet 0   • metoprolol tartrate (LOPRESSOR) 50 MG tablet 50 mg 2 (Two) Times a Day.     • naproxen (NAPROSYN) 500 MG tablet Take 500 mg by mouth 2 (Two) Times a Day.     • omeprazole (priLOSEC) 40 MG capsule Take 40 mg by mouth Daily.     • rivaroxaban (XARELTO) tablet Take 20 mg by mouth Daily.     • SITagliptin (JANUVIA) 50 MG tablet Take 50 mg by mouth Daily.     • traMADol (ULTRAM) 50 MG tablet Take 1 tablet by mouth Every 6 (Six) Hours As Needed for Moderate Pain . 18 tablet 0   • vitamin E 400 UNIT capsule Take 400 Units by mouth Daily.       No current facility-administered medications on file prior to visit.        ALLERGIES:  No Known Allergies     Social History     Socioeconomic History   • Marital status:    Tobacco Use   • Smoking status: Never Smoker   • Smokeless tobacco: Never Used   Vaping Use   • Vaping Use: Never used   Substance and Sexual  "Activity   • Alcohol use: No   • Drug use: No   • Sexual activity: Defer        Family History   Problem Relation Age of Onset   • Cancer Other    • Diabetes Other    • Glaucoma Other    • Rheum arthritis Other    • Kidney disease Other    • Breast cancer Neg Hx         Review of Systems   Constitutional: Positive for activity change, fatigue and fever. Negative for unexpected weight change.   HENT: Negative.    Respiratory: Negative.    Cardiovascular: Negative.    Gastrointestinal: Negative.    Musculoskeletal: Positive for arthralgias and gait problem.   Skin: Negative.    Hematological: Negative.    Psychiatric/Behavioral: Negative.           Objective     Vitals:    07/20/22 1318   BP: 138/77   Pulse: 82   Resp: 16   Temp: 98.7 °F (37.1 °C)   TempSrc: Infrared   SpO2: 95%   Weight: 93 kg (205 lb)   Height: 159 cm (62.6\")  Comment: new ht   PainSc: 0-No pain     Current Status 7/20/2022   ECOG score 1       Physical Exam    CONSTITUTIONAL: pleasant well-developed adult woman  HEENT: no icterus, no thrush, moist membranes  NECK: no jvd  LYMPH: no cervical or supraclavicular lad  CV: RRR, S1S2, no murmur  RESP: cta bilat, no wheezing, no rales  GI: soft, non-tender, no splenomegaly, +bs  MUSC: no edema, right foot in a cast  NEURO: alert and oriented x3, normal strength  PSYCH: normal mood and affect    RECENT LABS:  Hematology WBC   Date Value Ref Range Status   07/20/2022 6.18 3.40 - 10.80 10*3/mm3 Final   06/16/2022 5.0 3.4 - 10.8 x10E3/uL Final     RBC   Date Value Ref Range Status   07/20/2022 3.45 (L) 3.77 - 5.28 10*6/mm3 Final   06/16/2022 3.38 (L) 3.77 - 5.28 x10E6/uL Final     Hemoglobin   Date Value Ref Range Status   07/20/2022 10.4 (L) 12.0 - 15.9 g/dL Final     Hematocrit   Date Value Ref Range Status   07/20/2022 31.6 (L) 34.0 - 46.6 % Final     Platelets   Date Value Ref Range Status   07/20/2022 254 140 - 450 10*3/mm3 Final      Additional labs as per the HPI    Assessment & Plan     *Normocytic, " normochromic anemia- hemoglobin 10.4 and stable since 2019; normal white blood cell differential and platelet count  *Mild low iron stores on an oral supplement for the previous 3 weeks  *Mild low B12  *CKD 3  *Diabetes mellitus    Hematology plan/recommendations:  1. I suspect the patient's chronic anemia is likely secondary to CKD with low erythropoietin level  2. I recommended she continue an oral iron supplement for low iron stores  3. I will also recommend she begin a B12 replacement  4. Follow-up 3 months repeat CBC ferritin iron profile B12 and free light chain ratio

## 2022-07-20 NOTE — TELEPHONE ENCOUNTER
----- Message from Stiven Simon MD sent at 7/20/2022  2:08 PM EDT -----  Can you let her know after she left I noticed her B12 level was also a little low.  If she is not taking a B12 supplement I would recommend she start B12 500 mcg p.o. daily.

## 2022-09-06 RX ORDER — IRON POLYSACCHARIDE COMPLEX 150 MG
CAPSULE ORAL
Qty: 30 CAPSULE | Refills: 5 | Status: ON HOLD | OUTPATIENT
Start: 2022-09-06 | End: 2022-11-18

## 2022-09-26 ENCOUNTER — TELEPHONE (OUTPATIENT)
Dept: ORTHOPEDIC SURGERY | Facility: CLINIC | Age: 72
End: 2022-09-26

## 2022-09-26 NOTE — TELEPHONE ENCOUNTER
Caller: BERNA PACE     Relationship to patient: PATIENT     Best call back number: [phone]    Chief complaint: LEFT KNEE    Type of visit: INJECTION     Requested date: ASAP        Additional notes: WOULD LIKE TO COME IN ON A Monday OR Friday AS SHE IS OFF FROM WORK ON THESE DAYS

## 2022-10-03 ENCOUNTER — OFFICE VISIT (OUTPATIENT)
Dept: ORTHOPEDIC SURGERY | Facility: CLINIC | Age: 72
End: 2022-10-03

## 2022-10-03 VITALS — WEIGHT: 205 LBS | BODY MASS INDEX: 36.32 KG/M2 | HEIGHT: 63 IN

## 2022-10-03 DIAGNOSIS — M17.12 PRIMARY OSTEOARTHRITIS OF LEFT KNEE: Primary | ICD-10-CM

## 2022-10-03 PROCEDURE — 20610 DRAIN/INJ JOINT/BURSA W/O US: CPT | Performed by: NURSE PRACTITIONER

## 2022-10-03 RX ADMIN — LIDOCAINE HYDROCHLORIDE 8 ML: 10 INJECTION, SOLUTION EPIDURAL; INFILTRATION; INTRACAUDAL; PERINEURAL at 10:58

## 2022-10-03 RX ADMIN — TRIAMCINOLONE ACETONIDE 80 MG: 40 INJECTION, SUSPENSION INTRA-ARTICULAR; INTRAMUSCULAR at 10:58

## 2022-10-03 NOTE — PROGRESS NOTES
Subjective:     Patient ID: Lisa Gavin is a 71 y.o. female.    Chief Complaint:   follow-up DJD left knee  History of Present Illness  Lisa Gavin  Returns to clinic today for repeat injection left knee.  Denies any other concerns present.     Social History     Occupational History   • Occupation:    Tobacco Use   • Smoking status: Never Smoker   • Smokeless tobacco: Never Used   Vaping Use   • Vaping Use: Never used   Substance and Sexual Activity   • Alcohol use: No   • Drug use: No   • Sexual activity: Defer      Past Medical History:   Diagnosis Date   • A-fib (HCC)    • Arthritis    • Depression    • Disease of thyroid gland    • GERD (gastroesophageal reflux disease)    • Headache    • Hypertension    • Sleep apnea    • Type 2 diabetes mellitus (HCC)      Past Surgical History:   Procedure Laterality Date   • CHOLECYSTECTOMY     • COLONOSCOPY N/A 4/29/2022    Procedure: COLONOSCOPY;  Surgeon: Parmjit Traore MD;  Location: [facility];  Service: Gastroenterology;  Laterality: N/A;  Diverticulosis   • HYSTERECTOMY  1996    bilateral oophorectomy for heavy bleeding. No HRT   • ROTATOR CUFF REPAIR      ACUTE   • SHOULDER SURGERY Bilateral     hAS HAD ROTATOR CUFF SURGERY ON BOTH SHOULDERS       Family History   Problem Relation Age of Onset   • Diabetes Mother    • Kidney disease Father    • Arthritis Father    • Kidney cancer Father    • Prostate cancer Father    • Cancer Other    • Diabetes Other    • Glaucoma Other    • Rheum arthritis Other    • Kidney disease Other    • Breast cancer Neg Hx                Objective:  Physical Exam  General: No acute distress.  Eyes: conjunctiva clear; pupils equally round and reactive  ENT: external ears and nose atraumatic; oropharynx clear  CV: no peripheral edema  Resp: normal respiratory effort  Skin: no rashes or wounds; normal turgor  Psych: mood and affect appropriate; recent and remote memory intact    Vitals:    10/03/22 1027  "  Weight: 93 kg (205 lb)   Height: 159 cm (62.6\")         10/03/22  1027   Weight: 93 kg (205 lb)     Body mass index is 36.78 kg/m².      Assessment:        1. Primary osteoarthritis of left knee           Plan:  Large Joint Arthrocentesis: L knee  Date/Time: 10/3/2022 10:58 AM  Consent given by: patient  Site marked: site marked  Timeout: Immediately prior to procedure a time out was called to verify the correct patient, procedure, equipment, support staff and site/side marked as required   Supporting Documentation  Indications: pain   Procedure Details  Location: knee - L knee  Preparation: Patient was prepped and draped in the usual sterile fashion  Needle size: 22 G  Approach: superior (lateral)  Medications administered: 80 mg triamcinolone acetonide 40 MG/ML; 8 mL lidocaine PF 1% 1 %  Patient tolerance: patient tolerated the procedure well with no immediate complications          1. Wishes to proceed with corticosteroid injection.  Discussed application of ice at injection site this evening.  Plan to see her back in clinic in 3 months as needed if pain returns.  All questions answered.  Orders:  Orders Placed This Encounter   Procedures   • Large Joint Arthrocentesis: L knee     No orders of the defined types were placed in this encounter.          Dragon Dictation utilized.  "

## 2022-10-05 RX ORDER — LIDOCAINE HYDROCHLORIDE 10 MG/ML
8 INJECTION, SOLUTION EPIDURAL; INFILTRATION; INTRACAUDAL; PERINEURAL
Status: COMPLETED | OUTPATIENT
Start: 2022-10-03 | End: 2022-10-03

## 2022-10-05 RX ORDER — TRIAMCINOLONE ACETONIDE 40 MG/ML
80 INJECTION, SUSPENSION INTRA-ARTICULAR; INTRAMUSCULAR
Status: COMPLETED | OUTPATIENT
Start: 2022-10-03 | End: 2022-10-03

## 2022-10-19 ENCOUNTER — APPOINTMENT (OUTPATIENT)
Dept: LAB | Facility: HOSPITAL | Age: 72
End: 2022-10-19

## 2022-10-19 NOTE — PROGRESS NOTES
Subjective     REASON FOR FOLLOW-UP: Anemia  Provide an opinion on any further workup or treatment                             REQUESTING PHYSICIAN: Shalom    RECORDS OBTAINED:  Records of the patients history including those obtained from the referring provider were reviewed and summarized in detail.    HISTORY OF PRESENT ILLNESS:  The patient is a 72 y.o. year old female who is here for an opinion about the above issue.    History of Present Illness   This is a very pleasant 71-year-old lady with type 2 diabetes mellitus, CKD, arthritis on NSAIDs, atrial fibrillation on anticoagulation with Xarelto referred for evaluation of anemia.  Reviewing the labs available, the patient has had a stable anemia since at least October 2019 with hemoglobin running between 10.2-10.6.  She has persistently normal white blood cell count with differential and normal platelet count.  Additional recent evaluation by endocrinology showed a low iron saturation 5% with ferritin 57, B12 260, folate 7.7, nonsuppressed haptoglobin, negative SPEP/DEBORAH for monoclonality.  The patient denies need of blood transfusions.  She denies significant fatigue, lightheadedness, dizziness or shortness of breath.  She denies blood in the stool and reports recent negative colonoscopy.  She has not had a EGD but denies dyspepsia, abdominal pain or melanotic stool.    The patient returned today for follow-up.  She complains of fatigue no increased shortness of breath lightheadedness or dizziness above baseline.  She is taking oral iron and oral B12 daily.    Past Medical History:   Diagnosis Date   • A-fib (HCC)    • Arthritis    • Depression    • Disease of thyroid gland    • GERD (gastroesophageal reflux disease)    • Headache    • Hypertension    • Sleep apnea    • Type 2 diabetes mellitus (HCC)         Past Surgical History:   Procedure Laterality Date   • CHOLECYSTECTOMY     • COLONOSCOPY N/A 4/29/2022    Procedure: COLONOSCOPY;  Surgeon: Eugenie  Parmjit Boss MD;  Location: [facility];  Service: Gastroenterology;  Laterality: N/A;  Diverticulosis   • HYSTERECTOMY  1996    bilateral oophorectomy for heavy bleeding. No HRT   • ROTATOR CUFF REPAIR      ACUTE   • SHOULDER SURGERY Bilateral     hAS HAD ROTATOR CUFF SURGERY ON BOTH SHOULDERS        Current Outpatient Medications on File Prior to Visit   Medication Sig Dispense Refill   • atorvastatin (LIPITOR) 20 MG tablet 20 mg Every Night.     • Calcium Carbonate+Vitamin D (RA Calcium Plus Vitamin D) 600-200 MG-UNIT tablet Take 1 tablet by mouth 2 (Two) Times a Day.     • Cholecalciferol (Vitamin D3) 50 MCG (2000 UT) tablet 1 tablet daily 100 tablet 3   • citalopram (CeleXA) 20 MG tablet Take 2 tablets by mouth 2 (two) times a day.     • Diclofenac Sodium (VOLTAREN) 1 % gel gel As Needed. 1% gel     • flecainide (TAMBOCOR) 50 MG tablet Take 1 tablet by mouth.     • glyBURIDE-metFORMIN (GLUCOVANCE) 5-500 MG per tablet Take 2 tablets by mouth 2 (two) times a day.     • Insulin Glargine 100 UNIT/ML solution pen-injector Inject 60 Units under the skin into the appropriate area as directed Daily.     • iron polysaccharides (NIFEREX) 150 MG capsule TAKE 1 CAPSULE BY MOUTH EVERY DAY 30 capsule 5   • methIMAzole (TAPAZOLE) 5 MG tablet TAKE ONE TABLET BY MOUTH DAILY 30 tablet 0   • metoprolol tartrate (LOPRESSOR) 50 MG tablet 50 mg 2 (Two) Times a Day.     • naproxen (NAPROSYN) 500 MG tablet Take 500 mg by mouth 2 (Two) Times a Day.     • omeprazole (priLOSEC) 40 MG capsule Take 40 mg by mouth Daily.     • rivaroxaban (XARELTO) tablet Take 20 mg by mouth Daily.     • SITagliptin (JANUVIA) 50 MG tablet Take 50 mg by mouth Daily.     • traMADol (ULTRAM) 50 MG tablet Take 1 tablet by mouth Every 6 (Six) Hours As Needed for Moderate Pain . 18 tablet 0   • vitamin B-12 (CYANOCOBALAMIN) 500 MCG tablet Take 1 tablet by mouth Daily. 30 tablet 3   • vitamin E 400 UNIT capsule Take 400 Units by mouth Daily.     • [DISCONTINUED]  "furosemide (LASIX) 40 MG tablet 40 mg Daily.       No current facility-administered medications on file prior to visit.        ALLERGIES:  No Known Allergies     Social History     Socioeconomic History   • Marital status:    Tobacco Use   • Smoking status: Never   • Smokeless tobacco: Never   Vaping Use   • Vaping Use: Never used   Substance and Sexual Activity   • Alcohol use: No   • Drug use: No   • Sexual activity: Defer        Family History   Problem Relation Age of Onset   • Diabetes Mother    • Kidney disease Father    • Arthritis Father    • Kidney cancer Father    • Prostate cancer Father    • Cancer Other    • Diabetes Other    • Glaucoma Other    • Rheum arthritis Other    • Kidney disease Other    • Breast cancer Neg Hx         Review of Systems   Constitutional: Positive for fatigue. Negative for activity change, fever and unexpected weight change.   HENT: Negative.    Respiratory: Negative.    Cardiovascular: Negative.    Gastrointestinal: Negative.    Musculoskeletal: Positive for arthralgias and gait problem.   Skin: Negative.    Hematological: Negative.    Psychiatric/Behavioral: Negative.           Objective     Vitals:    10/20/22 1021   BP: 127/87   Pulse: 63   Resp: 16   Temp: 97.1 °F (36.2 °C)   TempSrc: Infrared   SpO2: 98%   Weight: 93.2 kg (205 lb 6.4 oz)   Height: 159 cm (62.6\")   PainSc: 0-No pain     Current Status 10/20/2022   ECOG score 0       Physical Exam    CONSTITUTIONAL: pleasant well-developed adult woman  HEENT: no icterus, no thrush, moist membranes  LYMPH: no cervical or supraclavicular lad  CV: Irregular, S1S2, no murmur  RESP: cta bilat, no wheezing, no rales  GI: soft, non-tender, no splenomegaly, +bs  MUSC: no edema, right foot in a cast  NEURO: alert and oriented x3, normal strength  PSYCH: normal mood and affect    RECENT LABS:  Hematology WBC   Date Value Ref Range Status   10/20/2022 7.74 3.40 - 10.80 10*3/mm3 Final   06/16/2022 5.0 3.4 - 10.8 x10E3/uL Final "     RBC   Date Value Ref Range Status   10/20/2022 3.52 (L) 3.77 - 5.28 10*6/mm3 Final   06/16/2022 3.38 (L) 3.77 - 5.28 x10E6/uL Final     Hemoglobin   Date Value Ref Range Status   10/20/2022 10.7 (L) 12.0 - 15.9 g/dL Final     Hematocrit   Date Value Ref Range Status   10/20/2022 34.0 34.0 - 46.6 % Final     Platelets   Date Value Ref Range Status   10/20/2022 205 140 - 450 10*3/mm3 Final      Additional labs as per the HPI    Assessment & Plan     *Normocytic, normochromic anemia- hemoglobin 10.7 and stable since 2019; normal white blood cell differential and platelet count  *Mild low iron stores on an oral supplement  *Mild low B12 on oral supplement  *CKD 3  *Diabetes mellitus    Hematology plan/recommendations:  1. chronic anemia secondary to CKD -hemoglobin stable 10.7  2. I recommended she continue an oral iron supplement for low iron stores  3. I recommended she continue oral B12 replacement  4. Follow-up 6 months repeat CBC ferritin iron profile B12

## 2022-10-20 ENCOUNTER — OFFICE VISIT (OUTPATIENT)
Dept: ONCOLOGY | Facility: CLINIC | Age: 72
End: 2022-10-20

## 2022-10-20 ENCOUNTER — LAB (OUTPATIENT)
Dept: LAB | Facility: HOSPITAL | Age: 72
End: 2022-10-20

## 2022-10-20 VITALS
RESPIRATION RATE: 16 BRPM | TEMPERATURE: 97.1 F | SYSTOLIC BLOOD PRESSURE: 127 MMHG | DIASTOLIC BLOOD PRESSURE: 87 MMHG | HEIGHT: 63 IN | HEART RATE: 63 BPM | OXYGEN SATURATION: 98 % | BODY MASS INDEX: 36.39 KG/M2 | WEIGHT: 205.4 LBS

## 2022-10-20 DIAGNOSIS — D63.1 ANEMIA IN STAGE 3A CHRONIC KIDNEY DISEASE: Primary | ICD-10-CM

## 2022-10-20 DIAGNOSIS — D63.1 ANEMIA IN STAGE 3A CHRONIC KIDNEY DISEASE: ICD-10-CM

## 2022-10-20 DIAGNOSIS — N18.31 ANEMIA IN STAGE 3A CHRONIC KIDNEY DISEASE: ICD-10-CM

## 2022-10-20 DIAGNOSIS — N18.31 ANEMIA IN STAGE 3A CHRONIC KIDNEY DISEASE: Primary | ICD-10-CM

## 2022-10-20 LAB
BASOPHILS # BLD AUTO: 0.02 10*3/MM3 (ref 0–0.2)
BASOPHILS NFR BLD AUTO: 0.3 % (ref 0–1.5)
DEPRECATED RDW RBC AUTO: 58.7 FL (ref 37–54)
EOSINOPHIL # BLD AUTO: 0.11 10*3/MM3 (ref 0–0.4)
EOSINOPHIL NFR BLD AUTO: 1.4 % (ref 0.3–6.2)
ERYTHROCYTE [DISTWIDTH] IN BLOOD BY AUTOMATED COUNT: 16.4 % (ref 12.3–15.4)
FERRITIN SERPL-MCNC: 26 NG/ML (ref 13–150)
HCT VFR BLD AUTO: 34 % (ref 34–46.6)
HGB BLD-MCNC: 10.7 G/DL (ref 12–15.9)
IMM GRANULOCYTES # BLD AUTO: 0.02 10*3/MM3 (ref 0–0.05)
IMM GRANULOCYTES NFR BLD AUTO: 0.3 % (ref 0–0.5)
IRON 24H UR-MRATE: 38 MCG/DL (ref 37–145)
IRON SATN MFR SERPL: 10 % (ref 20–50)
LYMPHOCYTES # BLD AUTO: 1.18 10*3/MM3 (ref 0.7–3.1)
LYMPHOCYTES NFR BLD AUTO: 15.2 % (ref 19.6–45.3)
MCH RBC QN AUTO: 30.4 PG (ref 26.6–33)
MCHC RBC AUTO-ENTMCNC: 31.5 G/DL (ref 31.5–35.7)
MCV RBC AUTO: 96.6 FL (ref 79–97)
MONOCYTES # BLD AUTO: 0.33 10*3/MM3 (ref 0.1–0.9)
MONOCYTES NFR BLD AUTO: 4.3 % (ref 5–12)
NEUTROPHILS NFR BLD AUTO: 6.08 10*3/MM3 (ref 1.7–7)
NEUTROPHILS NFR BLD AUTO: 78.5 % (ref 42.7–76)
PLATELET # BLD AUTO: 205 10*3/MM3 (ref 140–450)
PMV BLD AUTO: 11.4 FL (ref 6–12)
RBC # BLD AUTO: 3.52 10*6/MM3 (ref 3.77–5.28)
TIBC SERPL-MCNC: 366 MCG/DL (ref 298–536)
UIBC SERPL-MCNC: 328 MCG/DL (ref 112–346)
VIT B12 BLD-MCNC: 387 PG/ML (ref 211–946)
WBC NRBC COR # BLD: 7.74 10*3/MM3 (ref 3.4–10.8)

## 2022-10-20 PROCEDURE — 83540 ASSAY OF IRON: CPT

## 2022-10-20 PROCEDURE — 36415 COLL VENOUS BLD VENIPUNCTURE: CPT

## 2022-10-20 PROCEDURE — 83550 IRON BINDING TEST: CPT

## 2022-10-20 PROCEDURE — 82728 ASSAY OF FERRITIN: CPT

## 2022-10-20 PROCEDURE — 99213 OFFICE O/P EST LOW 20 MIN: CPT | Performed by: INTERNAL MEDICINE

## 2022-10-20 PROCEDURE — 85025 COMPLETE CBC W/AUTO DIFF WBC: CPT

## 2022-10-20 PROCEDURE — 82607 VITAMIN B-12: CPT

## 2022-10-20 PROCEDURE — 83521 IG LIGHT CHAINS FREE EACH: CPT

## 2022-10-20 RX ORDER — FLECAINIDE ACETATE 50 MG/1
100 TABLET ORAL 2 TIMES DAILY
COMMUNITY
Start: 2022-10-14

## 2022-10-21 ENCOUNTER — TELEPHONE (OUTPATIENT)
Dept: ONCOLOGY | Facility: CLINIC | Age: 72
End: 2022-10-21

## 2022-10-21 PROBLEM — K90.9 MALABSORPTION OF IRON: Status: ACTIVE | Noted: 2022-10-21

## 2022-10-21 PROBLEM — D50.9 IRON DEFICIENCY ANEMIA: Status: ACTIVE | Noted: 2022-10-21

## 2022-10-21 LAB
KAPPA LC FREE SER-MCNC: 41 MG/L (ref 3.3–19.4)
KAPPA LC FREE/LAMBDA FREE SER: 1.51 {RATIO} (ref 0.26–1.65)
LAMBDA LC FREE SERPL-MCNC: 27.2 MG/L (ref 5.7–26.3)

## 2022-10-21 NOTE — TELEPHONE ENCOUNTER
----- Message from Stiven Simon MD sent at 10/21/2022  7:25 AM EDT -----  PIP her iron tests look lower than last time despite taking oral iron  (confirm taking).  We can set her up for IV iron if she wants to proceed.  Has she had a colonoscopy?

## 2022-10-21 NOTE — TELEPHONE ENCOUNTER
Informed patient of Dr. Simon's message. She confirms she's still been taking oral iron. She also reports having a colonoscopy this year. She is agreeable to IV iron.

## 2022-10-26 ENCOUNTER — INFUSION (OUTPATIENT)
Dept: ONCOLOGY | Facility: HOSPITAL | Age: 72
End: 2022-10-26

## 2022-10-26 VITALS — HEART RATE: 59 BPM | TEMPERATURE: 97.8 F | SYSTOLIC BLOOD PRESSURE: 121 MMHG | DIASTOLIC BLOOD PRESSURE: 76 MMHG

## 2022-10-26 DIAGNOSIS — D50.9 IRON DEFICIENCY ANEMIA, UNSPECIFIED IRON DEFICIENCY ANEMIA TYPE: Primary | ICD-10-CM

## 2022-10-26 DIAGNOSIS — K90.9 MALABSORPTION OF IRON: ICD-10-CM

## 2022-10-26 PROCEDURE — 96374 THER/PROPH/DIAG INJ IV PUSH: CPT

## 2022-10-26 PROCEDURE — 63710000001 PROCHLORPERAZINE MALEATE PER 5 MG: Performed by: INTERNAL MEDICINE

## 2022-10-26 PROCEDURE — 25010000002 FERRIC CARBOXYMALTOSE 750 MG/15ML SOLUTION: Performed by: INTERNAL MEDICINE

## 2022-10-26 RX ORDER — PROCHLORPERAZINE MALEATE 5 MG/1
TABLET ORAL
Status: DISCONTINUED
Start: 2022-10-26 | End: 2022-10-26 | Stop reason: HOSPADM

## 2022-10-26 RX ORDER — PROCHLORPERAZINE MALEATE 5 MG/1
10 TABLET ORAL ONCE
Status: COMPLETED | OUTPATIENT
Start: 2022-10-26 | End: 2022-10-26

## 2022-10-26 RX ORDER — SODIUM CHLORIDE 9 MG/ML
250 INJECTION, SOLUTION INTRAVENOUS ONCE
Status: COMPLETED | OUTPATIENT
Start: 2022-10-26 | End: 2022-10-26

## 2022-10-26 RX ADMIN — FERRIC CARBOXYMALTOSE INJECTION 750 MG: 50 INJECTION, SOLUTION INTRAVENOUS at 15:00

## 2022-10-26 RX ADMIN — SODIUM CHLORIDE 250 ML: 9 INJECTION, SOLUTION INTRAVENOUS at 14:40

## 2022-10-26 RX ADMIN — PROCHLORPERAZINE MALEATE 10 MG: 5 TABLET ORAL at 14:39

## 2022-11-02 ENCOUNTER — INFUSION (OUTPATIENT)
Dept: ONCOLOGY | Facility: HOSPITAL | Age: 72
End: 2022-11-02

## 2022-11-02 VITALS
HEART RATE: 69 BPM | SYSTOLIC BLOOD PRESSURE: 130 MMHG | OXYGEN SATURATION: 97 % | DIASTOLIC BLOOD PRESSURE: 82 MMHG | TEMPERATURE: 98.2 F

## 2022-11-02 DIAGNOSIS — D50.9 IRON DEFICIENCY ANEMIA, UNSPECIFIED IRON DEFICIENCY ANEMIA TYPE: Primary | ICD-10-CM

## 2022-11-02 DIAGNOSIS — K90.9 MALABSORPTION OF IRON: ICD-10-CM

## 2022-11-02 PROCEDURE — 96374 THER/PROPH/DIAG INJ IV PUSH: CPT

## 2022-11-02 PROCEDURE — 63710000001 PROCHLORPERAZINE MALEATE PER 5 MG: Performed by: INTERNAL MEDICINE

## 2022-11-02 PROCEDURE — 25010000002 FERRIC CARBOXYMALTOSE 750 MG/15ML SOLUTION: Performed by: INTERNAL MEDICINE

## 2022-11-02 RX ORDER — PROCHLORPERAZINE MALEATE 5 MG/1
10 TABLET ORAL ONCE
Status: COMPLETED | OUTPATIENT
Start: 2022-11-02 | End: 2022-11-02

## 2022-11-02 RX ORDER — SODIUM CHLORIDE 9 MG/ML
250 INJECTION, SOLUTION INTRAVENOUS ONCE
Status: COMPLETED | OUTPATIENT
Start: 2022-11-02 | End: 2022-11-02

## 2022-11-02 RX ADMIN — SODIUM CHLORIDE 250 ML: 9 INJECTION, SOLUTION INTRAVENOUS at 12:32

## 2022-11-02 RX ADMIN — PROCHLORPERAZINE MALEATE 10 MG: 5 TABLET ORAL at 12:43

## 2022-11-02 RX ADMIN — FERRIC CARBOXYMALTOSE INJECTION 750 MG: 50 INJECTION, SOLUTION INTRAVENOUS at 13:09

## 2022-11-18 ENCOUNTER — HOSPITAL ENCOUNTER (OUTPATIENT)
Facility: HOSPITAL | Age: 72
Setting detail: OBSERVATION
Discharge: HOME | End: 2022-11-19
Attending: EMERGENCY MEDICINE | Admitting: HOSPITALIST

## 2022-11-18 ENCOUNTER — APPOINTMENT (OUTPATIENT)
Dept: CARDIOLOGY | Facility: HOSPITAL | Age: 72
End: 2022-11-18

## 2022-11-18 ENCOUNTER — APPOINTMENT (OUTPATIENT)
Dept: GENERAL RADIOLOGY | Facility: HOSPITAL | Age: 72
End: 2022-11-18

## 2022-11-18 DIAGNOSIS — I50.9 ACUTE ON CHRONIC CONGESTIVE HEART FAILURE, UNSPECIFIED HEART FAILURE TYPE: Primary | ICD-10-CM

## 2022-11-18 LAB
ALBUMIN SERPL-MCNC: 3.8 G/DL (ref 3.5–5.2)
ALBUMIN SERPL-MCNC: 4.1 G/DL (ref 3.5–5.2)
ALBUMIN/GLOB SERPL: 1 G/DL
ALBUMIN/GLOB SERPL: 1.2 G/DL
ALP SERPL-CCNC: 113 U/L (ref 39–117)
ALP SERPL-CCNC: 92 U/L (ref 39–117)
ALT SERPL W P-5'-P-CCNC: 25 U/L (ref 1–33)
ALT SERPL W P-5'-P-CCNC: 29 U/L (ref 1–33)
ANION GAP SERPL CALCULATED.3IONS-SCNC: 12.9 MMOL/L (ref 5–15)
ANION GAP SERPL CALCULATED.3IONS-SCNC: 15 MMOL/L (ref 5–15)
AORTIC DIMENSIONLESS INDEX: 0.7 (DI)
AST SERPL-CCNC: 18 U/L (ref 1–32)
AST SERPL-CCNC: 26 U/L (ref 1–32)
B PARAPERT DNA SPEC QL NAA+PROBE: NOT DETECTED
B PERT DNA SPEC QL NAA+PROBE: NOT DETECTED
BASOPHILS # BLD AUTO: 0.08 10*3/MM3 (ref 0–0.2)
BASOPHILS NFR BLD AUTO: 0.6 % (ref 0–1.5)
BH CV ECHO MEAS - AO MAX PG: 6.5 MMHG
BH CV ECHO MEAS - AO MEAN PG: 4 MMHG
BH CV ECHO MEAS - AO V2 MAX: 127 CM/SEC
BH CV ECHO MEAS - AO V2 VTI: 27.5 CM
BH CV ECHO MEAS - AVA(I,D): 2.49 CM2
BH CV ECHO MEAS - EDV(CUBED): 166.4 ML
BH CV ECHO MEAS - EDV(MOD-SP2): 150 ML
BH CV ECHO MEAS - EDV(MOD-SP4): 136 ML
BH CV ECHO MEAS - EF(MOD-BP): 60.2 %
BH CV ECHO MEAS - EF(MOD-SP2): 58 %
BH CV ECHO MEAS - EF(MOD-SP4): 61.8 %
BH CV ECHO MEAS - ESV(CUBED): 71 ML
BH CV ECHO MEAS - ESV(MOD-SP2): 63 ML
BH CV ECHO MEAS - ESV(MOD-SP4): 52 ML
BH CV ECHO MEAS - FS: 24.7 %
BH CV ECHO MEAS - IVS/LVPW: 1 CM
BH CV ECHO MEAS - IVSD: 0.8 CM
BH CV ECHO MEAS - LAT PEAK E' VEL: 8.4 CM/SEC
BH CV ECHO MEAS - LV DIASTOLIC VOL/BSA (35-75): 69 CM2
BH CV ECHO MEAS - LV MASS(C)D: 160 GRAMS
BH CV ECHO MEAS - LV MAX PG: 3.4 MMHG
BH CV ECHO MEAS - LV MEAN PG: 2 MMHG
BH CV ECHO MEAS - LV SYSTOLIC VOL/BSA (12-30): 26.4 CM2
BH CV ECHO MEAS - LV V1 MAX: 92.2 CM/SEC
BH CV ECHO MEAS - LV V1 VTI: 20.1 CM
BH CV ECHO MEAS - LVIDD: 5.5 CM
BH CV ECHO MEAS - LVIDS: 4.1 CM
BH CV ECHO MEAS - LVOT AREA: 3.4 CM2
BH CV ECHO MEAS - LVOT DIAM: 2.08 CM
BH CV ECHO MEAS - LVPWD: 0.8 CM
BH CV ECHO MEAS - MED PEAK E' VEL: 5.8 CM/SEC
BH CV ECHO MEAS - MR MAX PG: 117.6 MMHG
BH CV ECHO MEAS - MR MAX VEL: 542.2 CM/SEC
BH CV ECHO MEAS - MR MEAN PG: 77.6 MMHG
BH CV ECHO MEAS - MR MEAN VEL: 412.2 CM/SEC
BH CV ECHO MEAS - MR VTI: 197.4 CM
BH CV ECHO MEAS - MV A DUR: 0.16 SEC
BH CV ECHO MEAS - MV A MAX VEL: 73.9 CM/SEC
BH CV ECHO MEAS - MV DEC SLOPE: 648.7 CM/SEC2
BH CV ECHO MEAS - MV DEC TIME: 0.19 MSEC
BH CV ECHO MEAS - MV E MAX VEL: 98 CM/SEC
BH CV ECHO MEAS - MV E/A: 1.33
BH CV ECHO MEAS - MV MAX PG: 7.4 MMHG
BH CV ECHO MEAS - MV MEAN PG: 2.6 MMHG
BH CV ECHO MEAS - MV P1/2T: 62.9 MSEC
BH CV ECHO MEAS - MV V2 VTI: 32.8 CM
BH CV ECHO MEAS - MVA(P1/2T): 3.5 CM2
BH CV ECHO MEAS - MVA(VTI): 2.09 CM2
BH CV ECHO MEAS - PA ACC TIME: 0.09 SEC
BH CV ECHO MEAS - PA PR(ACCEL): 37.8 MMHG
BH CV ECHO MEAS - PA V2 MAX: 107 CM/SEC
BH CV ECHO MEAS - QP/QS: 1.98
BH CV ECHO MEAS - RAP SYSTOLE: 15 MMHG
BH CV ECHO MEAS - RV MAX PG: 2.5 MMHG
BH CV ECHO MEAS - RV V1 MAX: 79.1 CM/SEC
BH CV ECHO MEAS - RV V1 VTI: 19.6 CM
BH CV ECHO MEAS - RVOT DIAM: 3 CM
BH CV ECHO MEAS - RVSP: 48.2 MMHG
BH CV ECHO MEAS - SI(MOD-SP2): 44.1 ML/M2
BH CV ECHO MEAS - SI(MOD-SP4): 42.6 ML/M2
BH CV ECHO MEAS - SV(LVOT): 68.6 ML
BH CV ECHO MEAS - SV(MOD-SP2): 87 ML
BH CV ECHO MEAS - SV(MOD-SP4): 84 ML
BH CV ECHO MEAS - SV(RVOT): 135.8 ML
BH CV ECHO MEAS - TR MAX PG: 33.2 MMHG
BH CV ECHO MEAS - TR MAX VEL: 288 CM/SEC
BH CV ECHO MEASUREMENTS AVERAGE E/E' RATIO: 13.8
BH CV XLRA - RV BASE: 3.1 CM
BH CV XLRA - RV LENGTH: 8.1 CM
BH CV XLRA - RV MID: 2.11 CM
BH CV XLRA - TDI S': 12.8 CM/SEC
BILIRUB SERPL-MCNC: 0.7 MG/DL (ref 0–1.2)
BILIRUB SERPL-MCNC: 0.7 MG/DL (ref 0–1.2)
BUN SERPL-MCNC: 19 MG/DL (ref 8–23)
BUN SERPL-MCNC: 21 MG/DL (ref 8–23)
BUN/CREAT SERPL: 19.2 (ref 7–25)
BUN/CREAT SERPL: 22.8 (ref 7–25)
C PNEUM DNA NPH QL NAA+NON-PROBE: NOT DETECTED
CALCIUM SPEC-SCNC: 8.9 MG/DL (ref 8.6–10.5)
CALCIUM SPEC-SCNC: 9.2 MG/DL (ref 8.6–10.5)
CHLORIDE SERPL-SCNC: 102 MMOL/L (ref 98–107)
CHLORIDE SERPL-SCNC: 105 MMOL/L (ref 98–107)
CO2 SERPL-SCNC: 18 MMOL/L (ref 22–29)
CO2 SERPL-SCNC: 20.1 MMOL/L (ref 22–29)
CREAT SERPL-MCNC: 0.92 MG/DL (ref 0.57–1)
CREAT SERPL-MCNC: 0.99 MG/DL (ref 0.57–1)
DEPRECATED RDW RBC AUTO: 63.3 FL (ref 37–54)
DEPRECATED RDW RBC AUTO: 63.5 FL (ref 37–54)
EGFRCR SERPLBLD CKD-EPI 2021: 61.1 ML/MIN/1.73
EGFRCR SERPLBLD CKD-EPI 2021: 66.7 ML/MIN/1.73
EOSINOPHIL # BLD AUTO: 0.13 10*3/MM3 (ref 0–0.4)
EOSINOPHIL NFR BLD AUTO: 1 % (ref 0.3–6.2)
ERYTHROCYTE [DISTWIDTH] IN BLOOD BY AUTOMATED COUNT: 17 % (ref 12.3–15.4)
ERYTHROCYTE [DISTWIDTH] IN BLOOD BY AUTOMATED COUNT: 17.2 % (ref 12.3–15.4)
FLUAV RNA RESP QL NAA+PROBE: NOT DETECTED
FLUAV SUBTYP SPEC NAA+PROBE: NOT DETECTED
FLUBV RNA ISLT QL NAA+PROBE: NOT DETECTED
FLUBV RNA RESP QL NAA+PROBE: NOT DETECTED
GLOBULIN UR ELPH-MCNC: 3.2 GM/DL
GLOBULIN UR ELPH-MCNC: 4 GM/DL
GLUCOSE BLDC GLUCOMTR-MCNC: 105 MG/DL (ref 70–130)
GLUCOSE BLDC GLUCOMTR-MCNC: 174 MG/DL (ref 70–130)
GLUCOSE BLDC GLUCOMTR-MCNC: 256 MG/DL (ref 70–130)
GLUCOSE BLDC GLUCOMTR-MCNC: 282 MG/DL (ref 70–130)
GLUCOSE SERPL-MCNC: 314 MG/DL (ref 65–99)
GLUCOSE SERPL-MCNC: 376 MG/DL (ref 65–99)
HADV DNA SPEC NAA+PROBE: NOT DETECTED
HBA1C MFR BLD: 6.4 % (ref 4.8–5.6)
HCOV 229E RNA SPEC QL NAA+PROBE: NOT DETECTED
HCOV HKU1 RNA SPEC QL NAA+PROBE: NOT DETECTED
HCOV NL63 RNA SPEC QL NAA+PROBE: NOT DETECTED
HCOV OC43 RNA SPEC QL NAA+PROBE: NOT DETECTED
HCT VFR BLD AUTO: 35 % (ref 34–46.6)
HCT VFR BLD AUTO: 36.5 % (ref 34–46.6)
HGB BLD-MCNC: 11.1 G/DL (ref 12–15.9)
HGB BLD-MCNC: 11.9 G/DL (ref 12–15.9)
HMPV RNA NPH QL NAA+NON-PROBE: NOT DETECTED
HOLD SPECIMEN: NORMAL
HPIV1 RNA ISLT QL NAA+PROBE: NOT DETECTED
HPIV2 RNA SPEC QL NAA+PROBE: NOT DETECTED
HPIV3 RNA NPH QL NAA+PROBE: NOT DETECTED
HPIV4 P GENE NPH QL NAA+PROBE: NOT DETECTED
IMM GRANULOCYTES # BLD AUTO: 0.06 10*3/MM3 (ref 0–0.05)
IMM GRANULOCYTES NFR BLD AUTO: 0.5 % (ref 0–0.5)
LEFT ATRIUM VOLUME INDEX: 30.2 ML/M2
LYMPHOCYTES # BLD AUTO: 2.49 10*3/MM3 (ref 0.7–3.1)
LYMPHOCYTES NFR BLD AUTO: 18.9 % (ref 19.6–45.3)
M PNEUMO IGG SER IA-ACNC: NOT DETECTED
MAGNESIUM SERPL-MCNC: 1.7 MG/DL (ref 1.6–2.4)
MAGNESIUM SERPL-MCNC: 1.8 MG/DL (ref 1.6–2.4)
MAXIMAL PREDICTED HEART RATE: 149 BPM
MCH RBC QN AUTO: 31.9 PG (ref 26.6–33)
MCH RBC QN AUTO: 32.7 PG (ref 26.6–33)
MCHC RBC AUTO-ENTMCNC: 31.7 G/DL (ref 31.5–35.7)
MCHC RBC AUTO-ENTMCNC: 32.6 G/DL (ref 31.5–35.7)
MCV RBC AUTO: 100.3 FL (ref 79–97)
MCV RBC AUTO: 100.6 FL (ref 79–97)
MONOCYTES # BLD AUTO: 1.27 10*3/MM3 (ref 0.1–0.9)
MONOCYTES NFR BLD AUTO: 9.7 % (ref 5–12)
NEUTROPHILS NFR BLD AUTO: 69.3 % (ref 42.7–76)
NEUTROPHILS NFR BLD AUTO: 9.12 10*3/MM3 (ref 1.7–7)
NRBC BLD AUTO-RTO: 0 /100 WBC (ref 0–0.2)
NT-PROBNP SERPL-MCNC: 2372 PG/ML (ref 0–900)
PLATELET # BLD AUTO: 233 10*3/MM3 (ref 140–450)
PLATELET # BLD AUTO: 305 10*3/MM3 (ref 140–450)
PMV BLD AUTO: 11.2 FL (ref 6–12)
PMV BLD AUTO: 11.5 FL (ref 6–12)
POTASSIUM SERPL-SCNC: 4.1 MMOL/L (ref 3.5–5.2)
POTASSIUM SERPL-SCNC: 4.1 MMOL/L (ref 3.5–5.2)
POTASSIUM SERPL-SCNC: 4.4 MMOL/L (ref 3.5–5.2)
PROT SERPL-MCNC: 7 G/DL (ref 6–8.5)
PROT SERPL-MCNC: 8.1 G/DL (ref 6–8.5)
QT INTERVAL: 377 MS
QT INTERVAL: 392 MS
RBC # BLD AUTO: 3.48 10*6/MM3 (ref 3.77–5.28)
RBC # BLD AUTO: 3.64 10*6/MM3 (ref 3.77–5.28)
RHINOVIRUS RNA SPEC NAA+PROBE: NOT DETECTED
RSV RNA NPH QL NAA+NON-PROBE: NOT DETECTED
SARS-COV-2 RNA NPH QL NAA+NON-PROBE: NOT DETECTED
SARS-COV-2 RNA RESP QL NAA+PROBE: NOT DETECTED
SINUS: 2.8 CM
SODIUM SERPL-SCNC: 135 MMOL/L (ref 136–145)
SODIUM SERPL-SCNC: 138 MMOL/L (ref 136–145)
STRESS TARGET HR: 127 BPM
TROPONIN T SERPL-MCNC: <0.01 NG/ML (ref 0–0.03)
TROPONIN T SERPL-MCNC: <0.01 NG/ML (ref 0–0.03)
TSH SERPL DL<=0.05 MIU/L-ACNC: 2.29 UIU/ML (ref 0.27–4.2)
WBC NRBC COR # BLD: 10.3 10*3/MM3 (ref 3.4–10.8)
WBC NRBC COR # BLD: 13.15 10*3/MM3 (ref 3.4–10.8)
WHOLE BLOOD HOLD COAG: NORMAL
WHOLE BLOOD HOLD SPECIMEN: NORMAL

## 2022-11-18 PROCEDURE — 93306 TTE W/DOPPLER COMPLETE: CPT | Performed by: INTERNAL MEDICINE

## 2022-11-18 PROCEDURE — 83036 HEMOGLOBIN GLYCOSYLATED A1C: CPT | Performed by: NURSE PRACTITIONER

## 2022-11-18 PROCEDURE — 94761 N-INVAS EAR/PLS OXIMETRY MLT: CPT

## 2022-11-18 PROCEDURE — 84443 ASSAY THYROID STIM HORMONE: CPT | Performed by: NURSE PRACTITIONER

## 2022-11-18 PROCEDURE — 96376 TX/PRO/DX INJ SAME DRUG ADON: CPT

## 2022-11-18 PROCEDURE — 94660 CPAP INITIATION&MGMT: CPT

## 2022-11-18 PROCEDURE — 84484 ASSAY OF TROPONIN QUANT: CPT | Performed by: INTERNAL MEDICINE

## 2022-11-18 PROCEDURE — 63710000001 INSULIN ASPART PER 5 UNITS: Performed by: INTERNAL MEDICINE

## 2022-11-18 PROCEDURE — 99220 PR INITIAL OBSERVATION CARE/DAY 70 MINUTES: CPT | Performed by: INTERNAL MEDICINE

## 2022-11-18 PROCEDURE — 94799 UNLISTED PULMONARY SVC/PX: CPT

## 2022-11-18 PROCEDURE — G0378 HOSPITAL OBSERVATION PER HR: HCPCS

## 2022-11-18 PROCEDURE — 25010000002 PERFLUTREN (DEFINITY) 8.476 MG IN SODIUM CHLORIDE (PF) 0.9 % 10 ML INJECTION: Performed by: INTERNAL MEDICINE

## 2022-11-18 PROCEDURE — 0202U NFCT DS 22 TRGT SARS-COV-2: CPT | Performed by: NURSE PRACTITIONER

## 2022-11-18 PROCEDURE — 84132 ASSAY OF SERUM POTASSIUM: CPT | Performed by: INTERNAL MEDICINE

## 2022-11-18 PROCEDURE — 80053 COMPREHEN METABOLIC PANEL: CPT | Performed by: EMERGENCY MEDICINE

## 2022-11-18 PROCEDURE — 63710000001 INSULIN DETEMIR PER 5 UNITS: Performed by: INTERNAL MEDICINE

## 2022-11-18 PROCEDURE — 80053 COMPREHEN METABOLIC PANEL: CPT | Performed by: INTERNAL MEDICINE

## 2022-11-18 PROCEDURE — 84484 ASSAY OF TROPONIN QUANT: CPT | Performed by: EMERGENCY MEDICINE

## 2022-11-18 PROCEDURE — 82962 GLUCOSE BLOOD TEST: CPT

## 2022-11-18 PROCEDURE — 93010 ELECTROCARDIOGRAM REPORT: CPT | Performed by: INTERNAL MEDICINE

## 2022-11-18 PROCEDURE — 83735 ASSAY OF MAGNESIUM: CPT | Performed by: INTERNAL MEDICINE

## 2022-11-18 PROCEDURE — 99285 EMERGENCY DEPT VISIT HI MDM: CPT

## 2022-11-18 PROCEDURE — 96365 THER/PROPH/DIAG IV INF INIT: CPT

## 2022-11-18 PROCEDURE — 87636 SARSCOV2 & INF A&B AMP PRB: CPT | Performed by: EMERGENCY MEDICINE

## 2022-11-18 PROCEDURE — 85027 COMPLETE CBC AUTOMATED: CPT | Performed by: INTERNAL MEDICINE

## 2022-11-18 PROCEDURE — 83880 ASSAY OF NATRIURETIC PEPTIDE: CPT | Performed by: EMERGENCY MEDICINE

## 2022-11-18 PROCEDURE — 71045 X-RAY EXAM CHEST 1 VIEW: CPT

## 2022-11-18 PROCEDURE — 85025 COMPLETE CBC W/AUTO DIFF WBC: CPT | Performed by: EMERGENCY MEDICINE

## 2022-11-18 PROCEDURE — 93306 TTE W/DOPPLER COMPLETE: CPT

## 2022-11-18 PROCEDURE — 93005 ELECTROCARDIOGRAM TRACING: CPT | Performed by: EMERGENCY MEDICINE

## 2022-11-18 PROCEDURE — 25010000002 FUROSEMIDE PER 20 MG: Performed by: NURSE PRACTITIONER

## 2022-11-18 PROCEDURE — 25010000002 FUROSEMIDE PER 20 MG: Performed by: EMERGENCY MEDICINE

## 2022-11-18 PROCEDURE — 96375 TX/PRO/DX INJ NEW DRUG ADDON: CPT

## 2022-11-18 RX ORDER — METOPROLOL TARTRATE 50 MG/1
50 TABLET, FILM COATED ORAL EVERY 12 HOURS SCHEDULED
Status: DISCONTINUED | OUTPATIENT
Start: 2022-11-18 | End: 2022-11-19 | Stop reason: HOSPADM

## 2022-11-18 RX ORDER — PANTOPRAZOLE SODIUM 40 MG/1
40 TABLET, DELAYED RELEASE ORAL EVERY MORNING
Status: DISCONTINUED | OUTPATIENT
Start: 2022-11-18 | End: 2022-11-19 | Stop reason: HOSPADM

## 2022-11-18 RX ORDER — NITROGLYCERIN 0.4 MG/1
TABLET SUBLINGUAL
Status: COMPLETED
Start: 2022-11-18 | End: 2022-11-18

## 2022-11-18 RX ORDER — TRAMADOL HYDROCHLORIDE 50 MG/1
50 TABLET ORAL EVERY 6 HOURS PRN
Status: DISCONTINUED | OUTPATIENT
Start: 2022-11-18 | End: 2022-11-19 | Stop reason: HOSPADM

## 2022-11-18 RX ORDER — DEXTROSE MONOHYDRATE 25 G/50ML
25 INJECTION, SOLUTION INTRAVENOUS
Status: DISCONTINUED | OUTPATIENT
Start: 2022-11-18 | End: 2022-11-19 | Stop reason: HOSPADM

## 2022-11-18 RX ORDER — FUROSEMIDE 10 MG/ML
40 INJECTION INTRAMUSCULAR; INTRAVENOUS ONCE
Status: COMPLETED | OUTPATIENT
Start: 2022-11-18 | End: 2022-11-18

## 2022-11-18 RX ORDER — ATORVASTATIN CALCIUM 20 MG/1
20 TABLET, FILM COATED ORAL NIGHTLY
Status: DISCONTINUED | OUTPATIENT
Start: 2022-11-18 | End: 2022-11-19 | Stop reason: HOSPADM

## 2022-11-18 RX ORDER — ACETAMINOPHEN 650 MG/1
650 SUPPOSITORY RECTAL EVERY 4 HOURS PRN
Status: DISCONTINUED | OUTPATIENT
Start: 2022-11-18 | End: 2022-11-19 | Stop reason: HOSPADM

## 2022-11-18 RX ORDER — ACETAMINOPHEN 160 MG/5ML
650 SOLUTION ORAL EVERY 4 HOURS PRN
Status: DISCONTINUED | OUTPATIENT
Start: 2022-11-18 | End: 2022-11-19 | Stop reason: HOSPADM

## 2022-11-18 RX ORDER — ONDANSETRON 2 MG/ML
4 INJECTION INTRAMUSCULAR; INTRAVENOUS EVERY 6 HOURS PRN
Status: DISCONTINUED | OUTPATIENT
Start: 2022-11-18 | End: 2022-11-19 | Stop reason: HOSPADM

## 2022-11-18 RX ORDER — LABETALOL HYDROCHLORIDE 5 MG/ML
10 INJECTION, SOLUTION INTRAVENOUS EVERY 4 HOURS PRN
Status: DISCONTINUED | OUTPATIENT
Start: 2022-11-18 | End: 2022-11-19 | Stop reason: HOSPADM

## 2022-11-18 RX ORDER — FUROSEMIDE 10 MG/ML
40 INJECTION INTRAMUSCULAR; INTRAVENOUS EVERY 12 HOURS
Status: DISCONTINUED | OUTPATIENT
Start: 2022-11-18 | End: 2022-11-19

## 2022-11-18 RX ORDER — SODIUM CHLORIDE 9 MG/ML
40 INJECTION, SOLUTION INTRAVENOUS AS NEEDED
Status: DISCONTINUED | OUTPATIENT
Start: 2022-11-18 | End: 2022-11-19 | Stop reason: HOSPADM

## 2022-11-18 RX ORDER — INSULIN ASPART 100 [IU]/ML
0-9 INJECTION, SOLUTION INTRAVENOUS; SUBCUTANEOUS
Status: DISCONTINUED | OUTPATIENT
Start: 2022-11-18 | End: 2022-11-19 | Stop reason: HOSPADM

## 2022-11-18 RX ORDER — SODIUM CHLORIDE 0.9 % (FLUSH) 0.9 %
10 SYRINGE (ML) INJECTION AS NEEDED
Status: DISCONTINUED | OUTPATIENT
Start: 2022-11-18 | End: 2022-11-19 | Stop reason: HOSPADM

## 2022-11-18 RX ORDER — NICOTINE POLACRILEX 4 MG
15 LOZENGE BUCCAL
Status: DISCONTINUED | OUTPATIENT
Start: 2022-11-18 | End: 2022-11-19 | Stop reason: HOSPADM

## 2022-11-18 RX ORDER — CITALOPRAM 20 MG/1
20 TABLET ORAL NIGHTLY
Status: DISCONTINUED | OUTPATIENT
Start: 2022-11-18 | End: 2022-11-19 | Stop reason: HOSPADM

## 2022-11-18 RX ORDER — LOSARTAN POTASSIUM 50 MG/1
50 TABLET ORAL
Status: DISCONTINUED | OUTPATIENT
Start: 2022-11-18 | End: 2022-11-19 | Stop reason: HOSPADM

## 2022-11-18 RX ORDER — FUROSEMIDE 10 MG/ML
40 INJECTION INTRAMUSCULAR; INTRAVENOUS EVERY 12 HOURS
Status: DISCONTINUED | OUTPATIENT
Start: 2022-11-18 | End: 2022-11-18

## 2022-11-18 RX ORDER — CHOLECALCIFEROL (VITAMIN D3) 125 MCG
500 CAPSULE ORAL DAILY
Status: DISCONTINUED | OUTPATIENT
Start: 2022-11-18 | End: 2022-11-19 | Stop reason: HOSPADM

## 2022-11-18 RX ORDER — NITROGLYCERIN 20 MG/100ML
5-200 INJECTION INTRAVENOUS
Status: DISCONTINUED | OUTPATIENT
Start: 2022-11-18 | End: 2022-11-18

## 2022-11-18 RX ORDER — FLECAINIDE ACETATE 100 MG/1
100 TABLET ORAL EVERY 12 HOURS SCHEDULED
Status: DISCONTINUED | OUTPATIENT
Start: 2022-11-18 | End: 2022-11-19 | Stop reason: HOSPADM

## 2022-11-18 RX ORDER — ISOSORBIDE MONONITRATE 30 MG/1
30 TABLET, EXTENDED RELEASE ORAL
Status: DISCONTINUED | OUTPATIENT
Start: 2022-11-18 | End: 2022-11-19 | Stop reason: HOSPADM

## 2022-11-18 RX ORDER — NITROGLYCERIN 0.4 MG/1
0.4 TABLET SUBLINGUAL
Status: DISCONTINUED | OUTPATIENT
Start: 2022-11-18 | End: 2022-11-19 | Stop reason: HOSPADM

## 2022-11-18 RX ORDER — SODIUM CHLORIDE 0.9 % (FLUSH) 0.9 %
10 SYRINGE (ML) INJECTION EVERY 12 HOURS SCHEDULED
Status: DISCONTINUED | OUTPATIENT
Start: 2022-11-18 | End: 2022-11-19 | Stop reason: HOSPADM

## 2022-11-18 RX ORDER — GLIPIZIDE 5 MG/1
5 TABLET ORAL
Status: DISCONTINUED | OUTPATIENT
Start: 2022-11-19 | End: 2022-11-19 | Stop reason: HOSPADM

## 2022-11-18 RX ORDER — ONDANSETRON 4 MG/1
4 TABLET, FILM COATED ORAL EVERY 6 HOURS PRN
Status: DISCONTINUED | OUTPATIENT
Start: 2022-11-18 | End: 2022-11-19 | Stop reason: HOSPADM

## 2022-11-18 RX ORDER — CHOLECALCIFEROL (VITAMIN D3) 125 MCG
5 CAPSULE ORAL NIGHTLY PRN
Status: DISCONTINUED | OUTPATIENT
Start: 2022-11-18 | End: 2022-11-19 | Stop reason: HOSPADM

## 2022-11-18 RX ORDER — ACETAMINOPHEN 325 MG/1
650 TABLET ORAL EVERY 4 HOURS PRN
Status: DISCONTINUED | OUTPATIENT
Start: 2022-11-18 | End: 2022-11-19 | Stop reason: HOSPADM

## 2022-11-18 RX ORDER — METHIMAZOLE 5 MG/1
5 TABLET ORAL DAILY
Status: DISCONTINUED | OUTPATIENT
Start: 2022-11-18 | End: 2022-11-19 | Stop reason: HOSPADM

## 2022-11-18 RX ADMIN — METOPROLOL TARTRATE 50 MG: 50 TABLET, FILM COATED ORAL at 20:01

## 2022-11-18 RX ADMIN — CYANOCOBALAMIN TAB 500 MCG 500 MCG: 500 TAB at 08:34

## 2022-11-18 RX ADMIN — INSULIN DETEMIR 15 UNITS: 100 INJECTION, SOLUTION SUBCUTANEOUS at 20:02

## 2022-11-18 RX ADMIN — SODIUM CHLORIDE 2 ML: 9 INJECTION INTRAMUSCULAR; INTRAVENOUS; SUBCUTANEOUS at 07:59

## 2022-11-18 RX ADMIN — FUROSEMIDE 40 MG: 10 INJECTION, SOLUTION INTRAMUSCULAR; INTRAVENOUS at 02:53

## 2022-11-18 RX ADMIN — NITROGLYCERIN 20 MCG/MIN: 20 INJECTION INTRAVENOUS at 01:45

## 2022-11-18 RX ADMIN — CITALOPRAM HYDROBROMIDE 20 MG: 20 TABLET ORAL at 20:01

## 2022-11-18 RX ADMIN — ATORVASTATIN CALCIUM 20 MG: 20 TABLET, FILM COATED ORAL at 20:01

## 2022-11-18 RX ADMIN — METHIMAZOLE 5 MG: 5 TABLET ORAL at 08:34

## 2022-11-18 RX ADMIN — INSULIN ASPART 6 UNITS: 100 INJECTION, SOLUTION INTRAVENOUS; SUBCUTANEOUS at 11:56

## 2022-11-18 RX ADMIN — ACETAMINOPHEN 650 MG: 325 TABLET ORAL at 10:24

## 2022-11-18 RX ADMIN — INSULIN ASPART 6 UNITS: 100 INJECTION, SOLUTION INTRAVENOUS; SUBCUTANEOUS at 08:34

## 2022-11-18 RX ADMIN — NITROGLYCERIN: 0.4 TABLET SUBLINGUAL at 01:35

## 2022-11-18 RX ADMIN — ACETAMINOPHEN 650 MG: 325 TABLET ORAL at 04:10

## 2022-11-18 RX ADMIN — METFORMIN HYDROCHLORIDE 1000 MG: 500 TABLET ORAL at 18:22

## 2022-11-18 RX ADMIN — FUROSEMIDE 40 MG: 10 INJECTION, SOLUTION INTRAMUSCULAR; INTRAVENOUS at 23:16

## 2022-11-18 RX ADMIN — FUROSEMIDE 40 MG: 10 INJECTION, SOLUTION INTRAMUSCULAR; INTRAVENOUS at 11:56

## 2022-11-18 RX ADMIN — LOSARTAN POTASSIUM 50 MG: 50 TABLET, FILM COATED ORAL at 04:11

## 2022-11-18 RX ADMIN — METOPROLOL TARTRATE 50 MG: 50 TABLET, FILM COATED ORAL at 08:34

## 2022-11-18 RX ADMIN — ISOSORBIDE MONONITRATE 30 MG: 30 TABLET, EXTENDED RELEASE ORAL at 04:11

## 2022-11-18 RX ADMIN — PANTOPRAZOLE SODIUM 40 MG: 40 TABLET, DELAYED RELEASE ORAL at 05:47

## 2022-11-18 RX ADMIN — Medication 10 ML: at 08:36

## 2022-11-18 RX ADMIN — FLECAINIDE ACETATE 100 MG: 100 TABLET ORAL at 14:50

## 2022-11-18 RX ADMIN — Medication 10 ML: at 20:02

## 2022-11-18 RX ADMIN — RIVAROXABAN 20 MG: 20 TABLET, FILM COATED ORAL at 08:34

## 2022-11-18 RX ADMIN — ACETAMINOPHEN 650 MG: 325 TABLET ORAL at 17:04

## 2022-11-18 RX ADMIN — INSULIN DETEMIR 15 UNITS: 100 INJECTION, SOLUTION SUBCUTANEOUS at 08:36

## 2022-11-18 NOTE — PROGRESS NOTES
Patient seen and examined, overnight attending H&P notes reviewed.  Patient describes slightly increased shortness of air, staff placed patient on 1.5 L with sats 98%, no longer on bipap.  Patient has bilateral crackles on exam  Note that patient received single dose Lasix at 2:53 AM and therefore none was given yet this morning.  Daughter at bedside notes that patient has voided only 1 time this morning but did have good diuresis after initial Lasix dosing.  Schedule Lasix dose for now, hold Lasix dose for noon and monitor strict I and O, daily weight  Allow patient to use home CPAP    Acute on chronic HFpEF:  Paroxysmal atrial fibrillation:  Hypertension with hypertensive urgency:  HLD:  Echo done this admission shows again normal EF, normal diastolic function, elevated RVSP with mild to moderate pulmonary hypertension  Continue Lasix 40 mg every 12 hours IV  Add strict I and O, daily weight  Continue home Lipitor, Xarelto, metoprolol, losartan, BP at goal  Add home flecainide  Noted on imdur? Not on home med list?  Monitor on telemetry    DM2 in obese with hyperglycemia: A1c 6.4%  Glucose 300s on current regimen  Resume home glyburide 5/metformin 500 mg 2 tablets twice daily  Continue SSI and Levemir  Body mass index is 37.11 kg/m².    Hyperthyroidism: TSH normal on home methimazole 5 mg daily    GERD: Continue Protonix    KRISTIN: Allow home CPAP    B12 deficiency: Continue daily supplement    Weight loss:   30 lbs over 6 months  Dietitian monitoring

## 2022-11-18 NOTE — CASE MANAGEMENT/SOCIAL WORK
Continued Stay Note  KE[address]     Patient Name: Lisa Gavin  MRN: [id number]  Today's Date: 11/18/2022    Admit Date: 11/18/2022    Plan: DC home with spouse   Discharge Plan     Row Name 11/18/22 1122       Plan    Plan DC home with spouse    Plan Comments CCP spoke to pt and her spouse, introduced self, role and reviewed face sheet.  Pt lives in a multiple story home with her spouse and  a ramp to enter their home.  She is still working part time and is independent with ADL's.  The only DME is a cpap at night from [employer].  She had Home Health years ago but does not remember the name of the agency.  She has not been to rehab.  Her pharmacy is [facility] in [place], [place]. The Xarelto is the only one of her medications that cost over $500/month.  CCP gave some discount cards to pt.  Pts spouse told me pt may need Bipap at night on dc.CCP informed Nel of the conversation.    CCP made referral to [facility].  Pt does not have living will or poa.  Dc plan is home.  Spouse will transport pt home.  CCP will continue to follow. Thanks, Kimberly LAMB               Discharge Codes    No documentation.                     Kimberly Gardiner

## 2022-11-18 NOTE — PROGRESS NOTES
Sent Rx to pharmacy for Eliquis to determine if this medication is more affordable.  Copayment is similar to that with Xarelto.  Coupons given per case management already, canceled Eliquis prescription, continue Xarelto.

## 2022-11-18 NOTE — PLAN OF CARE
Goal Outcome Evaluation:  Plan of Care Reviewed With: patient        Progress: improving  Outcome Evaluation: A&Ox4. Room air currently. Headache treated. Telemetry. IV R wrist anf IV L FA, saline locked. Up ad ran. Daily weight. Respiratory panel negative. VSS.

## 2022-11-18 NOTE — PROGRESS NOTES
"Adult Nutrition  Assessment/PES    Patient Name:  Lisa Gavin  YOB: 1950  MRN: [id number]  Admit Date:  11/18/2022    Assessment Date:  11/18/2022    Comments:  Edu given on Na & CHF, declines DM edu needs.   Encourage po and voice food prefs. Poor appetite for months but declines supplement, fluid and obesity makes nutrition exam difficult at this time.   Will cont to follow and monitor.      Reason for Assessment     Row Name 11/18/22 1218          Reason for Assessment    Reason For Assessment diagnosis/disease state     Diagnosis cardiac disease;diabetes diagnosis/complications;renal disease  Acute CHF, AHRF, Flash Pulm Edema, HTN urgency hx DM     Identified At Risk by Screening Criteria MST SCORE 2+                Nutrition/Diet History     Row Name 11/18/22 1219          Nutrition/Diet History    Typical Intake (Food/Fluid/EN/PN) Spoke w pt & daughter at bedside w permission. NKFA. Denies issue chew/swallowing. Reports poor po intake for 6 months per daughter, nothing sounds good, specifically meat. She got iron infusion 2 weeks in a row and feels worse since then, daughter confirms she did get a bag of fluid each time. Stopped water pill recently per renal . Pt denies salt use but daughter questions at bedside. Spouse at home.                Labs/Tests/Procedures/Meds     Row Name 11/18/22 1220          Labs/Procedures/Meds    Lab Results Reviewed reviewed     Lab Results Comments glu 282, 314, 376, HgA1c 6.4        Diagnostic Tests/Procedures    Diagnostic Test/Procedure Reviewed reviewed        Medications    Pertinent Medications Reviewed reviewed     Pertinent Medications Comments lasix, novolog, levemir, B12                  Estimated/Assessed Needs - Anthropometrics     Row Name 11/18/22 1221 11/18/22 0758       Anthropometrics    Height -- 160 cm (62.99\")    Weight --  209.4# 95 kg (209 lb 7 oz)       Estimated/Assessed Needs    Additional Documentation Estimated Calorie Needs " (Group);Fluid Requirements (Group);Protein Requirements (Group) --       Estimated Calorie Needs    Estimated Calorie Requirement (kcal/day) 8935-9669 kcal ( mifflin 0-1.2)  162-194 gm CHO, 45% kcal --    Estimated Calorie Need Method Edgerton-St Jeor --       Protein Requirements    Est Protein Requirement Amount (gms/kg) 0.8 gm protein  76 gm pro --       Fluid Requirements    Estimated Fluid Requirement Method other (see comments)  7117-1383 ml HF guidelines --               Nutrition Prescription Ordered     Row Name 11/18/22 [facility]2          Nutrition Prescription PO    Common Modifiers Cardiac;Consistent Carbohydrate                Evaluation of Received Nutrient/Fluid Intake     Row Name 11/18/22 1222          Fluid Intake Evaluation    Oral Fluid (mL) --  insufficient data        PO Evaluation    Number of Days PO Intake Evaluated Insufficient Data                   Problem/Interventions:   Problem 1     Row Name 11/18/22 1222          Nutrition Diagnoses Problem 1    Problem 1 Knowledge Deficit     Etiology (related to) Medical Diagnosis     Cardiac CHF     Signs/Symptoms (evidenced by) Report/Observation                Problem 2     Row Name 11/18/22 1222          Nutrition Diagnoses Problem 2    Problem 2 Inadequate Nutrient Intake     Etiology (related to) Medical Diagnosis;Factors Affecting Nutrition     Appetite Poor     Signs/Symptoms (evidenced by) Report/Observation                    Intervention Goal     Row Name 11/18/22 1223          Intervention Goal    General Meet nutritional needs for age/condition;Provide information regarding MNT for treatment/condition     PO Establish PO;PO intake (%)     PO Intake % 50 %  or greater     Weight Appropriate weight loss                Nutrition Intervention     Row Name 11/18/22 1223          Nutrition Intervention    RD/Tech Action Follow Tx progress;Encourage intake                  Education/Evaluation     Row Name 11/18/22 1223          Education     Education Other (comment);Education topics;Provided education regarding;Advised regarding habits/behavior  Edu on daily wts at home to monitor changes & call MD with significant changes. Edu avoid direct Na & high Na foods . Edu choose fresh/frozen fruit/veggies. Lean pro or alt pro sources. Edu eating smaller more often at home to increase po intake.     Provided education regarding Avoidance of associated complications;Diet rationale;Medical diagnosis     Education Topics Protein;Na+;CHF  Heart Failure Nutrition Therapy provided with RD contact     Advised Regarding Habits/Behavior Label reading;Seasoning food;Eating pattern;Food choices;Food prep  Edu on reading food label for Na & serving, 140 mg/serving or less considered Low. Edu target 500 mg Na per meal to be under 2000 mg/day Na. Edu trying oral supplement on days not feeling like eating.        Monitor/Evaluation    Monitor Per protocol;I&O;PO intake;Pertinent labs;Weight;Symptoms     Education Follow-up Other (comment)  pt & daughter verbalize understanding                 Electronically signed by:  Patricia Sullivan RD  11/18/22 12:26 EST

## 2022-11-18 NOTE — ED PROVIDER NOTES
Subjective   History of Present Illness  72-year-old female presents via private vehicle with respiratory distress.  Majority of history provided by .  He reports that for the past week or so patient has had some dyspnea on exertion but otherwise is felt okay at rest.  He reports that went to bed around 930 this evening and patient was at her usual state of health.  He reports that she got up sometime in the night and went downstairs, ultimately called him and told him to come downstairs as she felt she was going to die.  He found patient in respiratory distress and brought her here.  Upon arrival here patient is awake but in severe respiratory distress, found to have initial oxygen saturations in 60s.  Patient states she cannot breathe.  Patient's oxygen saturations quickly improved with nonrebreather mask but continued to have increased work of breathing and was quickly placed on BiPAP.  Also hypertensive so given sublingual nitro and subsequently started on nitro drip.  Patient quickly improved work of breathing was able to provide a little history as well.  She denies any chest pain.  Denies fevers or chills.  Denies cough.  Per chart review patient sees cardiology for history of atrial fibrillation and PVCs.  Had an echo a few months ago which showed normal systolic function, grade 2 diastolic dysfunction, elevated right-sided systolic pressures.        Review of Systems   Unable to perform ROS: Acuity of condition       Past Medical History:   Diagnosis Date   • A-fib (HCC)    • Arthritis    • Depression    • Disease of thyroid gland    • GERD (gastroesophageal reflux disease)    • Headache    • Hypertension    • Kidney disease    • Sleep apnea    • Type 2 diabetes mellitus (HCC)        No Known Allergies    Past Surgical History:   Procedure Laterality Date   • CHOLECYSTECTOMY     • COLONOSCOPY N/A 4/29/2022    Procedure: COLONOSCOPY;  Surgeon: Parmjit Traore MD;  Location: [facility] OR;   Service: Gastroenterology;  Laterality: N/A;  Diverticulosis   • HYSTERECTOMY  1996    bilateral oophorectomy for heavy bleeding. No HRT   • ROTATOR CUFF REPAIR      ACUTE   • SHOULDER SURGERY Bilateral     hAS HAD ROTATOR CUFF SURGERY ON BOTH SHOULDERS       Family History   Problem Relation Age of Onset   • Diabetes Mother    • Kidney disease Father    • Arthritis Father    • Kidney cancer Father    • Prostate cancer Father    • Cancer Other    • Diabetes Other    • Glaucoma Other    • Rheum arthritis Other    • Kidney disease Other    • Breast cancer Neg Hx        Social History     Socioeconomic History   • Marital status:    Tobacco Use   • Smoking status: Never   • Smokeless tobacco: Never   Vaping Use   • Vaping Use: Never used   Substance and Sexual Activity   • Alcohol use: No   • Drug use: No   • Sexual activity: Defer           Objective   Physical Exam  Constitutional:       Comments: Patient awake, alert, able to shake her head yes and no, but in severe respiratory distress   HENT:      Head: Normocephalic and atraumatic.      Mouth/Throat:      Mouth: Mucous membranes are moist.      Pharynx: Oropharynx is clear.   Eyes:      Extraocular Movements: Extraocular movements intact.      Pupils: Pupils are equal, round, and reactive to light.   Cardiovascular:      Heart sounds: Normal heart sounds.      Comments: Heart rate 110s, regular  Pulmonary:      Comments: Respiratory rate upper 30s, severely labored, rales throughout  Abdominal:      General: There is no distension.      Palpations: Abdomen is soft.      Tenderness: There is no abdominal tenderness.   Musculoskeletal:         General: No swelling, tenderness, deformity or signs of injury. Normal range of motion.      Cervical back: Normal range of motion and neck supple. No tenderness.      Right lower leg: Edema present.      Left lower leg: Edema present.   Skin:     General: Skin is warm and dry.   Neurological:      General: No focal  deficit present.      Mental Status: She is oriented to person, place, and time. Mental status is at baseline.   Psychiatric:         Mood and Affect: Mood normal.         Behavior: Behavior normal.         Thought Content: Thought content normal.         Judgment: Judgment normal.         Procedures           ED Course  ED Course as of 11/18/22 0250 Fri Nov 18, 2022   0203 Patient was started on flecainide about 1 month ago for frequent PVCs. [TD]   0225  reports that about 2 weeks ago 1 of patient's doctors told her to stop taking her Lasix.  He is not sure which doctor this was.  Per cardiology note from last month patient was on 40 mg daily [TD]   0241 Patient improved quite quickly on nitro and BiPAP and we are able to wean these off.  Overall work-up pretty consistent with CHF exacerbation.  Troponin is negative.  EKG with some ST depressions but patient was quite hypoxic when she got here and has not had any chest pain.  Will give dose of Lasix here, admit to telemetry bed as long as she does okay off BiPAP and nitro. [TD]   0243 EKG at 0117 shows sinus tachycardia, rate 116, normal MD, prolonged QTC, overall poor data quality but appears to have some lateral ST depression [TD]   0244 EKG at 0155 shows sinus rhythm, rate 88, borderline prolonged MD at 210, normal QTC, minimal lateral ST depression but appears to be improved from previous.  PVCs. [TD]      ED Course User Index  [TD] Juan Calix MD                                           [facility]    Final diagnoses:   Acute on chronic congestive heart failure, unspecified heart failure type (HCC)       ED Disposition  ED Disposition     ED Disposition   Decision to Admit    Condition   --    Comment   Level of Care: Telemetry [5]   Diagnosis: Acute on chronic congestive heart failure, unspecified heart failure type (HCC) [[id number]]   Admitting Physician: JIMBO KRISHNAMURTHY [[id number]]   Attending Physician: JIMBO KRISHNAMURTHY [[id number]]               No follow-up  provider specified.       Medication List      No changes were made to your prescriptions during this visit.          Juan Calix MD  11/18/22 0259

## 2022-11-18 NOTE — PLAN OF CARE
Goal Outcome Evaluation:  Plan of Care Reviewed With: patient        Progress: improving  Outcome Evaluation: Pt admitted at 0315 from the ED with  at her side. Alert/oriented. O2 4L n/c. SR with pacs per telemetry. Ambulating to restroom and voiding. Tolerating well. BM 11/17 per pt report.

## 2022-11-18 NOTE — H&P
[facility] HOSPITALIST     Edith Chávez PA    CHIEF COMPLAINT:     Shortness of breath    HISTORY OF PRESENT ILLNESS:    72-year-old female past medical history as noted below presented to the ER for acute respiratory distress.  She reports that over the past week she been having some more RIOJAS, LE edema, and orthopnea occasionally.  Last night she had an episode of difficulty breathing but this subsided.  Then tonight the  reported that patient called him into the room as she was having respiratory distress with a smothering sensation.  She had no overt chest pain.  However given the respiratory distress  brought her to the emergency room.  Initially she was again found to be tachypneic with saturations in the 60s.  She had to be placed on BiPAP in the ER.  She was placed on nitro drip.  With the modalities, she seemed to improve.  She had abnormal chest x-ray, and given issues admission was requested.  Currently she had been weaned off the BiPAP at time of my exam.  She denies any chest pain she did not have any recent fever or chills she denied cough.      Past Medical History:   Diagnosis Date   • A-fib (HCC)    • Arthritis    • Depression    • Disease of thyroid gland    • GERD (gastroesophageal reflux disease)    • Headache    • Hypertension    • Kidney disease    • Sleep apnea    • Type 2 diabetes mellitus (HCC)      Past Surgical History:   Procedure Laterality Date   • CHOLECYSTECTOMY     • COLONOSCOPY N/A 4/29/2022    Procedure: COLONOSCOPY;  Surgeon: Parmjit Traore MD;  Location: [facility];  Service: Gastroenterology;  Laterality: N/A;  Diverticulosis   • HYSTERECTOMY  1996    bilateral oophorectomy for heavy bleeding. No HRT   • ROTATOR CUFF REPAIR      ACUTE   • SHOULDER SURGERY Bilateral     hAS HAD ROTATOR CUFF SURGERY ON BOTH SHOULDERS     Family History   Problem Relation Age of Onset   • Diabetes Mother    • Kidney disease Father    • Arthritis  "Father    • Kidney cancer Father    • Prostate cancer Father    • Cancer Other    • Diabetes Other    • Glaucoma Other    • Rheum arthritis Other    • Kidney disease Other    • Breast cancer Neg Hx      Social History     Tobacco Use   • Smoking status: Never   • Smokeless tobacco: Never   Vaping Use   • Vaping Use: Never used   Substance Use Topics   • Alcohol use: No   • Drug use: No     (Not in a hospital admission)    Allergies:  Patient has no known allergies.    Immunization History   Administered Date(s) Administered   • COVID-19 (PFIZER) PURPLE CAP 12/17/2021           REVIEW OF SYSTEMS:  Please see the above history of present illness for pertinent positives and negatives.  The remainder of the patient's systems have been reviewed and are negative.     Objective     Vital Signs  Temp:  [98.8 °F (37.1 °C)] 98.8 °F (37.1 °C)  Heart Rate:  [] 86  Resp:  [35] 35  BP: (152-223)/() 173/99    Flowsheet Rows    Flowsheet Row First Filed Value   Admission Height 167.6 cm (66\") Documented at 11/18/2022 0116   Admission Weight 100 kg (221 lb) Documented at 11/18/2022 0116           Physical Exam:  Physical Exam  Vitals reviewed.   Constitutional:       Appearance: She is ill-appearing.   HENT:      Head: Normocephalic.      Mouth/Throat:      Mouth: Mucous membranes are moist.   Eyes:      Pupils: Pupils are equal, round, and reactive to light.   Cardiovascular:      Rate and Rhythm: Normal rate.   Pulmonary:      Effort: Respiratory distress present.      Breath sounds: Rhonchi present. No wheezing.   Abdominal:      General: There is no distension.      Tenderness: There is no abdominal tenderness.   Musculoskeletal:      Cervical back: Neck supple.      Right lower leg: Edema present.      Left lower leg: Edema present.   Neurological:      Mental Status: She is alert and oriented to person, place, and time. Mental status is at baseline.   Psychiatric:         Mood and Affect: Mood normal.         " Behavior: Behavior normal.           Results Review:    I reviewed the patient's new clinical results.  Lab Results (most recent)     Procedure Component Value Units Date/Time    BNP [[id number]]  (Abnormal) Collected: 11/18/22 0129    Specimen: Blood Updated: 11/18/22 0231     proBNP 2,372.0 pg/mL     Narrative:      Among patients with dyspnea, NT-proBNP is highly sensitive for the detection of acute congestive heart failure. In addition NT-proBNP of <300 pg/ml effectively rules out acute congestive heart failure with 99% negative predictive value.    Results may be falsely decreased if patient taking Biotin.      Troponin [[id number]]  (Normal) Collected: 11/18/22 0129    Specimen: Blood Updated: 11/18/22 0231     Troponin T <0.010 ng/mL     Narrative:      Troponin T Reference Range:  <= 0.03 ng/mL-   Negative for AMI  >0.03 ng/mL-     Abnormal for myocardial necrosis.  Clinicians would have to utilize clinical acumen, EKG, Troponin and serial changes to determine if it is an Acute Myocardial Infarction or myocardial injury due to an underlying chronic condition.       Results may be falsely decreased if patient taking Biotin.      [place] Draw [[id number]] Collected: 11/18/22 0129    Specimen: Blood Updated: 11/18/22 0230    Narrative:      The following orders were created for panel order [place] Draw.  Procedure                               Abnormality         Status                     ---------                               -----------         ------                     Green Top (Gel)[[id number]]                                  Final result               Lavender Top[[id number]]                                     Final result               Gold Top - SST[[id number]]                                                              Light Blue Top[[id number]]                                   Final result                 Please view results for these tests on the individual orders.    Lavender Top [[id number]]  Collected: 11/18/22 0129    Specimen: Blood Updated: 11/18/22 0230     Extra Tube hold for add-on     Comment: Auto resulted       Green Top (Gel) [[id number]] Collected: 11/18/22 0129    Specimen: Blood Updated: 11/18/22 0230     Extra Tube Hold for add-ons.     Comment: Auto resulted.       Light Blue Top [[id number]] Collected: 11/18/22 0129    Specimen: Blood Updated: 11/18/22 0230     Extra Tube Hold for add-ons.     Comment: Auto resulted       Comprehensive Metabolic Panel [[id number]]  (Abnormal) Collected: 11/18/22 0129    Specimen: Blood Updated: 11/18/22 0215     Glucose 376 mg/dL      BUN 21 mg/dL      Creatinine 0.92 mg/dL      Sodium 138 mmol/L      Potassium 4.4 mmol/L      Chloride 105 mmol/L      CO2 18.0 mmol/L      Calcium 9.2 mg/dL      Total Protein 8.1 g/dL      Albumin 4.10 g/dL      ALT (SGPT) 29 U/L      AST (SGOT) 26 U/L      Alkaline Phosphatase 113 U/L      Total Bilirubin 0.7 mg/dL      Globulin 4.0 gm/dL      A/G Ratio 1.0 g/dL      BUN/Creatinine Ratio 22.8     Anion Gap 15.0 mmol/L      eGFR 66.7 mL/min/1.73      Comment: National Kidney Foundation and American Society of Nephrology (ASN) Task Force recommended calculation based on the Chronic Kidney Disease Epidemiology Collaboration (CKD-EPI) equation refit without adjustment for race.       Narrative:      GFR Normal >60  Chronic Kidney Disease <60  Kidney Failure <15    The GFR formula is only valid for adults with stable renal function between ages 18 and 70.    COVID-19 and FLU A/B PCR - Swab, Nasopharynx [[id number]]  (Normal) Collected: 11/18/22 0129    Specimen: Swab from Nasopharynx Updated: 11/18/22 0158     COVID19 Not Detected     Influenza A PCR Not Detected     Influenza B PCR Not Detected    Narrative:      Fact sheet for providers: https://www.fda.gov/media/912782/download    Fact sheet for patients: https://www.fda.gov/media/591384/download    Test performed by PCR.    CBC & Differential [[id number]]  (Abnormal)  Collected: 11/18/22 0129    Specimen: Blood Updated: 11/18/22 0139    Narrative:      The following orders were created for panel order CBC & Differential.  Procedure                               Abnormality         Status                     ---------                               -----------         ------                     CBC Auto Differential[[id number]]        Abnormal            Final result                 Please view results for these tests on the individual orders.    CBC Auto Differential [[id number]]  (Abnormal) Collected: 11/18/22 0129    Specimen: Blood Updated: 11/18/22 0139     WBC 13.15 10*3/mm3      RBC 3.64 10*6/mm3      Hemoglobin 11.9 g/dL      Hematocrit 36.5 %      .3 fL      MCH 32.7 pg      MCHC 32.6 g/dL      RDW 17.2 %      RDW-SD 63.5 fl      MPV 11.2 fL      Platelets 305 10*3/mm3      Neutrophil % 69.3 %      Lymphocyte % 18.9 %      Monocyte % 9.7 %      Eosinophil % 1.0 %      Basophil % 0.6 %      Immature Grans % 0.5 %      Neutrophils, Absolute 9.12 10*3/mm3      Lymphocytes, Absolute 2.49 10*3/mm3      Monocytes, Absolute 1.27 10*3/mm3      Eosinophils, Absolute 0.13 10*3/mm3      Basophils, Absolute 0.08 10*3/mm3      Immature Grans, Absolute 0.06 10*3/mm3      nRBC 0.0 /100 WBC           Imaging Results (Most Recent)     Procedure Component Value Units Date/Time    XR Chest 1 View [[id number]] Collected: 11/18/22 0217     Updated: 11/18/22 0219    Narrative:      CR Chest 1 Vw    INDICATION:   Shortness of air.     COMPARISON:    None available.    FINDINGS:  Portable AP view(s) of the chest.  Heart is enlarged. Extensive bilateral infiltrates may reflect pulmonary edema although pneumonia is not excluded, particularly at the left base. There is a small left effusion. No pneumothorax. Postoperative changes  are noted in the left shoulder.      Impression:      Findings most suggestive of CHF although pneumonia is not excluded at the left base. Small left effusion is  present.    Signer Name: Aleksandar Koehler MD   Signed: 11/18/2022 2:17 AM   Workstation [email or web address]    [facility]        reviewed personally given history showing vascular congestion consistent with pulmonary edema    ECG/EMG Results (most recent)     Procedure Component Value Units Date/Time    ECG 12 Lead ED Triage Standing Order; SOA [[id number]] Collected: 11/18/22 0117     Updated: 11/18/22 0118     QT Interval 392 ms     Narrative:      HEART RATE= 116  bpm  RR Interval= 520  ms  MA Interval= 129  ms  P Horizontal Axis= 71  deg  P Front Axis= 0  deg  QRSD Interval= 123  ms  QT Interval= 392  ms  QRS Axis= -6  deg  T Wave Axis= 83  deg  - ABNORMAL ECG -  Sinus tachycardia  LVH with secondary repolarization abnormality  Electronically Signed By:   Date and Time of Study: 2022-11-18 01:17:08    ECG 12 Lead Dyspnea [[id number]] Collected: 11/18/22 0155     Updated: 11/18/22 0156     QT Interval 377 ms     Narrative:      HEART RATE= 88  bpm  RR Interval= 680  ms  MA Interval= 210  ms  P Horizontal Axis= -39  deg  P Front Axis= 0  deg  QRSD Interval= 101  ms  QT Interval= 377  ms  QRS Axis= 34  deg  T Wave Axis= -77  deg  - ABNORMAL ECG -  Sinus rhythm  Multiple ventricular premature complexes  Probable left atrial enlargement  Borderline repol abnormality, diffuse leads  Electronically Signed By:   Date and Time of Study: 2022-11-18 01:55:18        reviewed sinus rhythm but also some PVCs      Assessment & Plan     Active Hospital Problems:  Active Hospital Problems    Diagnosis    • **Acute on chronic congestive heart failure, unspecified heart failure type (HCC)      Plan:     Acute on chronic HFpEF  -Echo April '22: EF 55 to 60%, grade 2 diastolic dysfunction and RVSP 35  -Repeat echo  -Telemetry  -CXR as above  -Intensify antihypertensive regimen  -Aggressive IV diuresis as renal function allows    Acute hypoxic respiratory failure due to flash pulmonary edema from above  -Weaning  BiPAP  -Continue supplemental O2  -See above    HTN urgency  -Related to above  -As needed labetalol  -Add losartan and imdur   -Follow and adjust routine vital sign    PAF, PVCs  -Recently started on flecainide, will hold for now  -Telemetry  -awaiting home meds confirmation to start AC    Type II DM  -Hold home oral meds  -Continue basal, SSI  -Follow Accu-Chek, adjust prn    KRISTIN  -Can bring in home unit, until then use hospital CPAP home settings    Morbid obesity  -Complicating all aspects of care as noted above    High risk     DVT ppx-SCDs    This patient has been examined wearing appropriate Personal Protective Equipment . 11/18/22      I discussed the patient's findings and my recommendations with patient and  at bedside.     Electronically signed by Speedy Ahumada DO, 11/18/22, 03:10 EST.

## 2022-11-18 NOTE — DISCHARGE PLACEMENT REQUEST
"Berna Pace (72 y.o. Female)     Date of Birth   1950    Social Security Number       Address   [address] [id number]    Home Phone   [phone]    MRN   [id number]       Scientology   Lutheran    Marital Status                               Admission Date   11/18/22    Admission Type   Emergency    Admitting Provider   Speedy Ahumada DO    Attending Provider   Speedy Ahumada DO    Department, Room/Bed   [facility] MED SURG, 1415/1       Discharge Date       Discharge Disposition       Discharge Destination                               Attending Provider: Speedy Ahumada DO    Allergies: No Known Allergies    Isolation: Droplet   Infection: None   Code Status: CPR    Ht: 160 cm (62.99\")   Wt: 95 kg (209 lb 7 oz)    Admission Cmt: None   Principal Problem: Acute on chronic congestive heart failure, unspecified heart failure type (HCC) [I50.9]                 Active Insurance as of 11/18/2022     Primary Coverage     Payor Plan Insurance Group Employer/Plan Group    MEDICARE MEDICARE A & B      Payor Plan Address Payor Plan Phone Number Payor Plan Fax Number Effective Dates    [address] [phone]  10/1/2016 - None Entered    formerly [facility] [id number]       Subscriber Name Subscriber Birth Date Member ID       BERNA PACE 1950 [id number]           Secondary Coverage     Payor Plan Insurance Group Employer/Plan Group    [facility] SUPP [id number]     Payor Plan Address Payor Plan Phone Number Payor Plan Fax Number Effective Dates    [address]   1/1/2022 - None Entered    [facility] [id number]       Subscriber Name Subscriber Birth Date Member ID       BERNA PACE 1950 [id number]                 Emergency Contacts      (Rel.) Home Phone Work Phone Mobile Phone    Jack Pace (Spouse) [phone] -- [phone]              "

## 2022-11-19 VITALS
SYSTOLIC BLOOD PRESSURE: 158 MMHG | HEIGHT: 63 IN | HEART RATE: 64 BPM | RESPIRATION RATE: 20 BRPM | TEMPERATURE: 97.6 F | WEIGHT: 202.6 LBS | DIASTOLIC BLOOD PRESSURE: 77 MMHG | BODY MASS INDEX: 35.9 KG/M2 | OXYGEN SATURATION: 93 %

## 2022-11-19 PROBLEM — I27.20 PULMONARY HYPERTENSION: Status: ACTIVE | Noted: 2022-11-19

## 2022-11-19 LAB
ANION GAP SERPL CALCULATED.3IONS-SCNC: 9.8 MMOL/L (ref 5–15)
BUN SERPL-MCNC: 21 MG/DL (ref 8–23)
BUN/CREAT SERPL: 20 (ref 7–25)
CALCIUM SPEC-SCNC: 8.9 MG/DL (ref 8.6–10.5)
CHLORIDE SERPL-SCNC: 106 MMOL/L (ref 98–107)
CO2 SERPL-SCNC: 25.2 MMOL/L (ref 22–29)
CREAT SERPL-MCNC: 1.05 MG/DL (ref 0.57–1)
EGFRCR SERPLBLD CKD-EPI 2021: 56.9 ML/MIN/1.73
GLUCOSE BLDC GLUCOMTR-MCNC: 114 MG/DL (ref 70–130)
GLUCOSE BLDC GLUCOMTR-MCNC: 199 MG/DL (ref 70–130)
GLUCOSE SERPL-MCNC: 102 MG/DL (ref 65–99)
POTASSIUM SERPL-SCNC: 3.9 MMOL/L (ref 3.5–5.2)
SODIUM SERPL-SCNC: 141 MMOL/L (ref 136–145)

## 2022-11-19 PROCEDURE — G0378 HOSPITAL OBSERVATION PER HR: HCPCS

## 2022-11-19 PROCEDURE — 94618 PULMONARY STRESS TESTING: CPT

## 2022-11-19 PROCEDURE — 90662 IIV NO PRSV INCREASED AG IM: CPT | Performed by: HOSPITALIST

## 2022-11-19 PROCEDURE — 99217 PR OBSERVATION CARE DISCHARGE MANAGEMENT: CPT | Performed by: HOSPITALIST

## 2022-11-19 PROCEDURE — 25010000002 INFLUENZA VAC HIGH-DOSE QUAD 0.7 ML SUSPENSION PREFILLED SYRINGE: Performed by: HOSPITALIST

## 2022-11-19 PROCEDURE — 80048 BASIC METABOLIC PNL TOTAL CA: CPT | Performed by: NURSE PRACTITIONER

## 2022-11-19 PROCEDURE — G0008 ADMIN INFLUENZA VIRUS VAC: HCPCS | Performed by: HOSPITALIST

## 2022-11-19 PROCEDURE — 63710000001 INSULIN ASPART PER 5 UNITS: Performed by: INTERNAL MEDICINE

## 2022-11-19 PROCEDURE — 63710000001 INSULIN DETEMIR PER 5 UNITS: Performed by: INTERNAL MEDICINE

## 2022-11-19 PROCEDURE — 82962 GLUCOSE BLOOD TEST: CPT

## 2022-11-19 RX ORDER — LOSARTAN POTASSIUM 50 MG/1
50 TABLET ORAL
Qty: 30 TABLET | Refills: 0 | Status: SHIPPED | OUTPATIENT
Start: 2022-11-20 | End: 2023-02-18

## 2022-11-19 RX ADMIN — INSULIN DETEMIR 15 UNITS: 100 INJECTION, SOLUTION SUBCUTANEOUS at 09:23

## 2022-11-19 RX ADMIN — ISOSORBIDE MONONITRATE 30 MG: 30 TABLET, EXTENDED RELEASE ORAL at 09:24

## 2022-11-19 RX ADMIN — PANTOPRAZOLE SODIUM 40 MG: 40 TABLET, DELAYED RELEASE ORAL at 05:49

## 2022-11-19 RX ADMIN — Medication 10 ML: at 09:24

## 2022-11-19 RX ADMIN — METFORMIN HYDROCHLORIDE 1000 MG: 500 TABLET ORAL at 09:23

## 2022-11-19 RX ADMIN — FLECAINIDE ACETATE 100 MG: 100 TABLET ORAL at 09:30

## 2022-11-19 RX ADMIN — LOSARTAN POTASSIUM 50 MG: 50 TABLET, FILM COATED ORAL at 09:24

## 2022-11-19 RX ADMIN — METHIMAZOLE 5 MG: 5 TABLET ORAL at 09:23

## 2022-11-19 RX ADMIN — METOPROLOL TARTRATE 50 MG: 50 TABLET, FILM COATED ORAL at 09:24

## 2022-11-19 RX ADMIN — GLIPIZIDE 5 MG: 5 TABLET ORAL at 09:24

## 2022-11-19 RX ADMIN — INFLUENZA A VIRUS A/VICTORIA/2570/2019 IVR-215 (H1N1) ANTIGEN (FORMALDEHYDE INACTIVATED), INFLUENZA A VIRUS A/DARWIN/9/2021 SAN-010 (H3N2) ANTIGEN (FORMALDEHYDE INACTIVATED), INFLUENZA B VIRUS B/PHUKET/3073/2013 ANTIGEN (FORMALDEHYDE INACTIVATED), AND INFLUENZA B VIRUS B/MICHIGAN/01/2021 ANTIGEN (FORMALDEHYDE INACTIVATED) 0.7 ML: 60; 60; 60; 60 INJECTION, SUSPENSION INTRAMUSCULAR at 13:50

## 2022-11-19 RX ADMIN — RIVAROXABAN 20 MG: 20 TABLET, FILM COATED ORAL at 09:23

## 2022-11-19 RX ADMIN — INSULIN ASPART 2 UNITS: 100 INJECTION, SOLUTION INTRAVENOUS; SUBCUTANEOUS at 12:39

## 2022-11-19 RX ADMIN — CYANOCOBALAMIN TAB 500 MCG 500 MCG: 500 TAB at 09:23

## 2022-11-19 NOTE — DISCHARGE SUMMARY
Lisa Gavin  1950  [id number]    Hospitalists Discharge Summary    Date of Admission: 11/18/2022  Date of Discharge:  11/19/2022    Primary Discharge Diagnoses:  1.  Acute Hypoxic Respiratory Failure due pleural fluid.  2.  Hypertensive Urgency  3.  Obesity Body mass index is 35.9 kg/m².    Secondary Discharge Diagnoses:  1.  Paraoxysmal Atrial Fibrillation  2.  KRISTIN  3.  Diabetes Mellitus, Type 2 in Obese A1c of 6.4%  4.  Pulmonary Hypertension  5.  Hyperthyroidism  6.  GERD    History of Present Illness (taken from H&P):  72-year-old female past medical history as noted below presented to the ER for acute respiratory distress.  She reports that over the past week she been having some more RIOJAS, LE edema, and orthopnea occasionally.  Last night she had an episode of difficulty breathing but this subsided.  Then tonight the  reported that patient called him into the room as she was having respiratory distress with a smothering sensation.  She had no overt chest pain.  However given the respiratory distress  brought her to the emergency room.  Initially she was again found to be tachypneic with saturations in the 60s.  She had to be placed on BiPAP in the ER.  She was placed on nitro drip.  With the modalities, she seemed to improve.  She had abnormal chest x-ray, and given issues admission was requested.  Currently she had been weaned off the BiPAP at time of my exam.  She denies any chest pain she did not have any recent fever or chills she denied cough.    Hospital Course:  Ms. Guajardo was admitted to the ICU and continued on BiPAP.  Her antihypertensive regimen was increased and the nitro drip weaned off.  She responded to IV diuresis w/ Lasix.  Repeat echo (last was 4/2022) demonstrated a preserved EF and normal Diastolic function.  Pulmonary hypertension was present.  It is unclear what lead to accumulation of fluid unless due to uncontrolled hypertension and pulmonary hypertension.  May  need a repeat sleep study.  No bursts of uncontrolled afib were noted.  Blood glucose improved with the addition of her home medication.    PCP  Patient Care Team:  Edith Chávez PA as PCP - General (Physician Assistant)  Franky Rausch MD as Consulting Physician (Endocrinology)  Sheldon Reid MD as Surgeon (Orthopedic Surgery)  Nathan Ramos MD as Consulting Physician (Cardiology)  Franky Rausch MD as Referring Physician (Endocrinology)  Stiven Simon MD as Consulting Physician (Hematology and Oncology)    Consults:   Consults     No orders found for last 30 day(s).          Operations and Procedures Performed:       Adult Transthoracic Echo Complete w/ Color, Spectral and Contrast if necessary per protocol    Result Date: 11/18/2022  Narrative: •  Left ventricular systolic function is normal. Calculated left ventricular EF = 60.2% •  Left ventricular diastolic function was normal. •  Estimated right ventricular systolic pressure from tricuspid regurgitation is moderately elevated (45-55 mmHg). Calculated right ventricular systolic pressure from tricuspid regurgitation is 48 mmHg. •  Mild to moderate pulmonary hypertension is present.     XR Chest 1 View    Result Date: 11/18/2022  Narrative: CR Chest 1 Vw INDICATION: Shortness of air. COMPARISON:  None available. FINDINGS: Portable AP view(s) of the chest.  Heart is enlarged. Extensive bilateral infiltrates may reflect pulmonary edema although pneumonia is not excluded, particularly at the left base. There is a small left effusion. No pneumothorax. Postoperative changes are noted in the left shoulder.     Impression: Findings most suggestive of CHF although pneumonia is not excluded at the left base. Small left effusion is present. Signer Name: Aleksandar Koehler MD  Signed: 11/18/2022 2:17 AM  Workstation Name: [facility]  [facility]      Allergies:  has No Known  Allergies.    Andrea  reviewed    Discharge Medications:     Discharge Medications      New Medications      Instructions Start Date   losartan 50 MG tablet  Commonly known as: COZAAR   50 mg, Oral, Every 24 Hours Scheduled   Start Date: November 20, 2022        Continue These Medications      Instructions Start Date   atorvastatin 20 MG tablet  Commonly known as: LIPITOR   20 mg, Nightly      Calcium Carbonate+Vitamin D 600-200 MG-UNIT tablet  Commonly known as: RA Calcium Plus Vitamin D   1 tablet, Oral, 2 Times Daily      citalopram 20 MG tablet  Commonly known as: CeleXA   40 mg, Oral, Nightly      Diclofenac Sodium 1 % gel gel  Commonly known as: VOLTAREN   As Needed, 1% gel      flecainide 50 MG tablet  Commonly known as: TAMBOCOR   100 mg, Oral, 2 Times Daily      glyBURIDE-metFORMIN 5-500 MG per tablet  Commonly known as: GLUCOVANCE   2 tablets, Oral, 2 Times Daily      methIMAzole 5 MG tablet  Commonly known as: TAPAZOLE   TAKE ONE TABLET BY MOUTH DAILY      metoprolol tartrate 50 MG tablet  Commonly known as: LOPRESSOR   50 mg, 2 Times Daily      omeprazole 40 MG capsule  Commonly known as: priLOSEC   40 mg, Oral, Daily      rivaroxaban 20 MG tablet  Commonly known as: XARELTO   20 mg, Oral, Daily      traMADol 50 MG tablet  Commonly known as: ULTRAM   50 mg, Oral, Every 6 Hours PRN      vitamin B-12 500 MCG tablet  Commonly known as: CYANOCOBALAMIN   500 mcg, Oral, Daily      Vitamin D3 50 MCG (2000 UT) tablet   1 tablet daily      vitamin E 400 UNIT capsule   400 Units, Oral, Daily         Stop These Medications    naproxen 500 MG tablet  Commonly known as: NAPROSYN            Last Lab Results:   Lab Results (most recent)     Procedure Component Value Units Date/Time    POC Glucose Once [[id number]]  (Normal) Collected: 11/19/22 0739    Specimen: Blood Updated: 11/19/22 0746     Glucose 114 mg/dL      Comment: Meter: [id number] : [id number] Andrea Gavin CNA       Basic Metabolic Panel [[id number]]   (Abnormal) Collected: 11/19/22 0424    Specimen: Blood Updated: 11/19/22 0520     Glucose 102 mg/dL      BUN 21 mg/dL      Creatinine 1.05 mg/dL      Sodium 141 mmol/L      Potassium 3.9 mmol/L      Chloride 106 mmol/L      CO2 25.2 mmol/L      Calcium 8.9 mg/dL      BUN/Creatinine Ratio 20.0     Anion Gap 9.8 mmol/L      eGFR 56.9 mL/min/1.73      Comment: National Kidney Foundation and American Society of Nephrology (ASN) Task Force recommended calculation based on the Chronic Kidney Disease Epidemiology Collaboration (CKD-EPI) equation refit without adjustment for race.       Narrative:      GFR Normal >60  Chronic Kidney Disease <60  Kidney Failure <15    The GFR formula is only valid for adults with stable renal function between ages 18 and 70.    POC Glucose Once [[id number]]  (Abnormal) Collected: 11/18/22 1949    Specimen: Blood Updated: 11/18/22 2005     Glucose 174 mg/dL      Comment: Meter: [id number] : [id number] Leslie Price RN       Respiratory Panel PCR w/COVID-19(SARS-CoV-2) ANDREW/BIGG/NGA/PAD/COR/MAD/CELSA In-House, NP Swab in UTM/VTM, 3-4 HR TAT - Swab, Nasopharynx [[id number]]  (Normal) Collected: 11/18/22 1110    Specimen: Swab from Nasopharynx Updated: 11/18/22 1739     ADENOVIRUS, PCR Not Detected     Coronavirus 229E Not Detected     Coronavirus HKU1 Not Detected     Coronavirus NL63 Not Detected     Coronavirus OC43 Not Detected     COVID19 Not Detected     Human Metapneumovirus Not Detected     Human Rhinovirus/Enterovirus Not Detected     Influenza A PCR Not Detected     Influenza B PCR Not Detected     Parainfluenza Virus 1 Not Detected     Parainfluenza Virus 2 Not Detected     Parainfluenza Virus 3 Not Detected     Parainfluenza Virus 4 Not Detected     RSV, PCR Not Detected     Bordetella pertussis pcr Not Detected     Bordetella parapertussis PCR Not Detected     Chlamydophila pneumoniae PCR Not Detected     Mycoplasma pneumo by PCR Not Detected    Narrative:      In the setting of a  positive respiratory panel with a viral infection PLUS a negative procalcitonin without other underlying concern for bacterial infection, consider observing off antibiotics or discontinuation of antibiotics and continue supportive care. If the respiratory panel is positive for atypical bacterial infection (Bordetella pertussis, Chlamydophila pneumoniae, or Mycoplasma pneumoniae), consider antibiotic de-escalation to target atypical bacterial infection.    Potassium [[id number]]  (Normal) Collected: 11/18/22 1618    Specimen: Blood Updated: 11/18/22 1645     Potassium 4.1 mmol/L     Magnesium [[id number]]  (Normal) Collected: 11/18/22 1618    Specimen: Blood Updated: 11/18/22 1645     Magnesium 1.8 mg/dL     Troponin [[id number]]  (Normal) Collected: 11/18/22 0403    Specimen: Blood Updated: 11/18/22 1618     Troponin T <0.010 ng/mL     Narrative:      Troponin T Reference Range:  <= 0.03 ng/mL-   Negative for AMI  >0.03 ng/mL-     Abnormal for myocardial necrosis.  Clinicians would have to utilize clinical acumen, EKG, Troponin and serial changes to determine if it is an Acute Myocardial Infarction or myocardial injury due to an underlying chronic condition.       Results may be falsely decreased if patient taking Biotin.      TSH [[id number]]  (Normal) Collected: 11/18/22 0403    Specimen: Blood Updated: 11/18/22 1045     TSH 2.290 uIU/mL     Hemoglobin A1c [[id number]]  (Abnormal) Collected: 11/18/22 0403    Specimen: Blood Updated: 11/18/22 1043     Hemoglobin A1C 6.40 %     Narrative:      Hemoglobin A1C Ranges:    Increased Risk for Diabetes  5.7% to 6.4%  Diabetes                     >= 6.5%  Diabetic Goal                < 7.0%    [place] Draw [[id number]] Collected: 11/18/22 0129    Specimen: Blood Updated: 11/18/22 1030    Narrative:      The following orders were created for panel order [place] Draw.  Procedure                               Abnormality         Status                     ---------                                -----------         ------                     Green Top (Gel)[[id number]]                                  Final result               Lavender Top[[id number]]                                     Final result               Gold Top - SST[[id number]]                                                              Light Blue Top[[id number]]                                   Final result                 Please view results for these tests on the individual orders.    Comprehensive Metabolic Panel [[id number]]  (Abnormal) Collected: 11/18/22 0403    Specimen: Blood Updated: 11/18/22 0522     Glucose 314 mg/dL      BUN 19 mg/dL      Creatinine 0.99 mg/dL      Sodium 135 mmol/L      Potassium 4.1 mmol/L      Chloride 102 mmol/L      CO2 20.1 mmol/L      Calcium 8.9 mg/dL      Total Protein 7.0 g/dL      Albumin 3.80 g/dL      ALT (SGPT) 25 U/L      AST (SGOT) 18 U/L      Alkaline Phosphatase 92 U/L      Total Bilirubin 0.7 mg/dL      Globulin 3.2 gm/dL      A/G Ratio 1.2 g/dL      BUN/Creatinine Ratio 19.2     Anion Gap 12.9 mmol/L      eGFR 61.1 mL/min/1.73      Comment: National Kidney Foundation and American Society of Nephrology (ASN) Task Force recommended calculation based on the Chronic Kidney Disease Epidemiology Collaboration (CKD-EPI) equation refit without adjustment for race.       Narrative:      GFR Normal >60  Chronic Kidney Disease <60  Kidney Failure <15    The GFR formula is only valid for adults with stable renal function between ages 18 and 70.    Magnesium [[id number]]  (Normal) Collected: 11/18/22 0403    Specimen: Blood Updated: 11/18/22 0522     Magnesium 1.7 mg/dL     CBC (No Diff) [[id number]]  (Abnormal) Collected: 11/18/22 0403    Specimen: Blood Updated: 11/18/22 0435     WBC 10.30 10*3/mm3      RBC 3.48 10*6/mm3      Hemoglobin 11.1 g/dL      Hematocrit 35.0 %      .6 fL      MCH 31.9 pg      MCHC 31.7 g/dL      RDW 17.0 %      RDW-SD 63.3 fl      MPV 11.5 fL      Platelets 233  10*3/mm3     BNP [[id number]]  (Abnormal) Collected: 11/18/22 0129    Specimen: Blood Updated: 11/18/22 0231     proBNP 2,372.0 pg/mL     Narrative:      Among patients with dyspnea, NT-proBNP is highly sensitive for the detection of acute congestive heart failure. In addition NT-proBNP of <300 pg/ml effectively rules out acute congestive heart failure with 99% negative predictive value.    Results may be falsely decreased if patient taking Biotin.      Troponin [[id number]]  (Normal) Collected: 11/18/22 0129    Specimen: Blood Updated: 11/18/22 0231     Troponin T <0.010 ng/mL     Narrative:      Troponin T Reference Range:  <= 0.03 ng/mL-   Negative for AMI  >0.03 ng/mL-     Abnormal for myocardial necrosis.  Clinicians would have to utilize clinical acumen, EKG, Troponin and serial changes to determine if it is an Acute Myocardial Infarction or myocardial injury due to an underlying chronic condition.       Results may be falsely decreased if patient taking Biotin.      Lavender Top [[id number]] Collected: 11/18/22 0129    Specimen: Blood Updated: 11/18/22 0230     Extra Tube hold for add-on     Comment: Auto resulted       Green Top (Gel) [[id number]] Collected: 11/18/22 0129    Specimen: Blood Updated: 11/18/22 0230     Extra Tube Hold for add-ons.     Comment: Auto resulted.       Light Blue Top [[id number]] Collected: 11/18/22 0129    Specimen: Blood Updated: 11/18/22 0230     Extra Tube Hold for add-ons.     Comment: Auto resulted       Comprehensive Metabolic Panel [[id number]]  (Abnormal) Collected: 11/18/22 0129    Specimen: Blood Updated: 11/18/22 0215     Glucose 376 mg/dL      BUN 21 mg/dL      Creatinine 0.92 mg/dL      Sodium 138 mmol/L      Potassium 4.4 mmol/L      Chloride 105 mmol/L      CO2 18.0 mmol/L      Calcium 9.2 mg/dL      Total Protein 8.1 g/dL      Albumin 4.10 g/dL      ALT (SGPT) 29 U/L      AST (SGOT) 26 U/L      Alkaline Phosphatase 113 U/L      Total Bilirubin 0.7 mg/dL      Globulin 4.0  gm/dL      A/G Ratio 1.0 g/dL      BUN/Creatinine Ratio 22.8     Anion Gap 15.0 mmol/L      eGFR 66.7 mL/min/1.73      Comment: National Kidney Foundation and American Society of Nephrology (ASN) Task Force recommended calculation based on the Chronic Kidney Disease Epidemiology Collaboration (CKD-EPI) equation refit without adjustment for race.       Narrative:      GFR Normal >60  Chronic Kidney Disease <60  Kidney Failure <15    The GFR formula is only valid for adults with stable renal function between ages 18 and 70.    COVID-19 and FLU A/B PCR - Swab, Nasopharynx [[id number]]  (Normal) Collected: 11/18/22 0129    Specimen: Swab from Nasopharynx Updated: 11/18/22 0158     COVID19 Not Detected     Influenza A PCR Not Detected     Influenza B PCR Not Detected    Narrative:      Fact sheet for providers: https://www.fda.gov/media/150170/download    Fact sheet for patients: https://www.fda.gov/media/081644/download    Test performed by PCR.    CBC & Differential [[id number]]  (Abnormal) Collected: 11/18/22 0129    Specimen: Blood Updated: 11/18/22 0139    Narrative:      The following orders were created for panel order CBC & Differential.  Procedure                               Abnormality         Status                     ---------                               -----------         ------                     CBC Auto Differential[[id number]]        Abnormal            Final result                 Please view results for these tests on the individual orders.    CBC Auto Differential [[id number]]  (Abnormal) Collected: 11/18/22 0129    Specimen: Blood Updated: 11/18/22 0139     WBC 13.15 10*3/mm3      RBC 3.64 10*6/mm3      Hemoglobin 11.9 g/dL      Hematocrit 36.5 %      .3 fL      MCH 32.7 pg      MCHC 32.6 g/dL      RDW 17.2 %      RDW-SD 63.5 fl      MPV 11.2 fL      Platelets 305 10*3/mm3      Neutrophil % 69.3 %      Lymphocyte % 18.9 %      Monocyte % 9.7 %      Eosinophil % 1.0 %      Basophil % 0.6 %       Immature Grans % 0.5 %      Neutrophils, Absolute 9.12 10*3/mm3      Lymphocytes, Absolute 2.49 10*3/mm3      Monocytes, Absolute 1.27 10*3/mm3      Eosinophils, Absolute 0.13 10*3/mm3      Basophils, Absolute 0.08 10*3/mm3      Immature Grans, Absolute 0.06 10*3/mm3      nRBC 0.0 /100 WBC         Imaging Results (Most Recent)     Procedure Component Value Units Date/Time    XR Chest 1 View [[id number]] Collected: 11/18/22 0217     Updated: 11/18/22 0219    Narrative:      CR Chest 1 Vw    INDICATION:   Shortness of air.     COMPARISON:    None available.    FINDINGS:  Portable AP view(s) of the chest.  Heart is enlarged. Extensive bilateral infiltrates may reflect pulmonary edema although pneumonia is not excluded, particularly at the left base. There is a small left effusion. No pneumothorax. Postoperative changes  are noted in the left shoulder.      Impression:      Findings most suggestive of CHF although pneumonia is not excluded at the left base. Small left effusion is present.    Signer Name: Aleksandar Koehler MD   Signed: 11/18/2022 2:17 AM   Workstation Name: [facility]    [facility]          PROCEDURES      Condition on Discharge:  Stable    Physical Exam at Discharge  Vital Signs  Temp:  [96.4 °F (35.8 °C)-97.9 °F (36.6 °C)] 97.9 °F (36.6 °C)  Heart Rate:  [56-67] 62  Resp:  [20] 20  BP: (127-163)/(68-89) 163/89    Physical Exam:  Physical Exam   Constitutional: Patient appears well-developed and well-nourished and in no acute distress   Cardiovascular: Regular rate, regular rhythm, S1 normal and S2 normal.  Exam reveals no gallop and no friction rub.  No murmur heard.  Radial pulses are 2+ and symmetric.  I do not appreciate a RV heave.  Pulmonary/Chest: Lungs are clear to auscultation bilaterally. No respiratory distress. No wheezes. No rhonchi. No rales.   Abdominal: Obese. Soft. Bowel sounds are normal. No distension and no mass. There is no tenderness.   Musculoskeletal:  Normal Muscle tone  Extremities: No edema.   Neurological: Cranial nerves II-XII are grossly intact with no focal deficits.  Skin: Skin is warm. No rash noted. Nails show no clubbing.  No cyanosis or erythema.    Discharge Disposition  Home    Visiting Nurse:    No     Home PT/OT:  No     Home Safety Evaluation:  No     DME  None    Discharge Diet:      Dietary Orders (From admission, onward)     Start     Ordered    11/18/22 0801  Diet: Cardiac Diets, Diabetic Diets; Healthy Heart (2-3 Na+); Consistent Carbohydrate; Texture: Regular Texture (IDDSI 7); Fluid Consistency: Thin (IDDSI 0)  Diet Effective Now        References:    Diet Order [place]   Question Answer Comment   Diets: Cardiac Diets    Diets: Diabetic Diets    Cardiac Diet: Healthy Heart (2-3 Na+)    Diabetic Diet: Consistent Carbohydrate    Texture: Regular Texture (IDDSI 7)    Fluid Consistency: Thin (IDDSI 0)        11/18/22 0801 11/18/22 0801  Snack:  Once         11/18/22 0801                Activity at Discharge:  As tolerated    Follow-up Appointments  Future Appointments   Date Time Provider Department Center   4[facility]11[facility]2023 10:10 [facility] [place]   4/11/2023 10:40 AM Stiven Simon MD [facility] [place]   7/14/2023  8:00 AM Aranza Banda MD [facility] None     Additional Instructions for the Follow-ups that You Need to Schedule     Discharge Follow-up with PCP   As directed       Currently Documented PCP:    Edith Chávez, PA    PCP Phone Number:    [phone]     Follow Up Details: 1 week.  Needs repeat checmistry panel.         Discharge Follow-up with Specified Provider: Dr. Apodaca; 1 Month   As directed      To: Dr. Apodaca    Follow Up: 1 Month               Test Results Pending at Discharge  None     Mando Rosado MD  11/19/22  11:46 EST

## 2022-11-19 NOTE — CASE MANAGEMENT/SOCIAL WORK
Case Management Discharge Note      Final Note: Discharged home.         Selected Continued Care - Discharged on 11/19/2022 Admission date: 11/18/2022 - Discharge disposition: Home or Self Care    Destination    No services have been selected for the patient.              Durable Medical Equipment    No services have been selected for the patient.              Dialysis/Infusion    No services have been selected for the patient.              Home Medical Care    No services have been selected for the patient.              Therapy    No services have been selected for the patient.              Community Resources    No services have been selected for the patient.              Community & DME    No services have been selected for the patient.                       Final Discharge Disposition Code: 01 - home or self-care

## 2022-11-19 NOTE — PROCEDURES
Exercise Oximetry    Patient Name:Lisa Gavin   MRN: [id number]   Date: 11/19/22             ROOM AIR BASELINE   SpO2% 95   Heart Rate 67     Blood Pressure      EXERCISE ON ROOM AIR SpO2% EXERCISE ON O2 @  LPM SpO2%   1 MINUTE  94 1 MINUTE    2 MINUTES  96  2 MINUTES    3 MINUTES  96 3 MINUTES    4 MINUTES  95 4 MINUTES    5 MINUTES  96 5 MINUTES    6 MINUTES  96  6 MINUTES               Distance Walked   450ft Distance Walked   Dyspnea (Edilma Scale)   3/6 Dyspnea (Edilma Scale)   Fatigue (Edilma Scale)   0/3 Fatigue (Edilma Scale)   SpO2% Post Exercise  97 SpO2% Post Exercise   HR Post Exercise  71 HR Post Exercise   Time to Recovery  1 minute Time to Recovery     Comments:

## 2022-11-19 NOTE — PROGRESS NOTES
MD order for patient to use home positive airway pressure device.  Unit reviewed and appears to be a device specified in [employer] recall.  Patient provided information on recall and directed to discuss any concerns with sleep provider and DME.  Patient states desire to continue use of home device during hospital stay.

## 2022-11-19 NOTE — DISCHARGE INSTR - LAB
Patient will need to call Dr. Edith Chávez office Monday to make follow up appointment within 1 week     [phone]    Patient will need to call Dr. Apodaca office to Monday make a follow up appointment within 1 month     [phone]

## 2022-11-19 NOTE — PLAN OF CARE
Goal Outcome Evaluation:  Plan of Care Reviewed With: patient        Progress: improving  Outcome Evaluation: r/a. Continued IV lasix as ordered. Misses hat with voiding so unable to obtain accurate outputs. SR with PAC's per telemetry. Up independently to restroom. BM 11/18 per pt report.

## 2022-11-20 ENCOUNTER — READMISSION MANAGEMENT (OUTPATIENT)
Dept: CALL CENTER | Facility: HOSPITAL | Age: 72
End: 2022-11-20

## 2022-11-20 NOTE — OUTREACH NOTE
Prep Survey    Flowsheet Row Responses   [facility] facility patient discharged from? [facility]   Is LACE score < 7 ? No   Emergency Room discharge w/ pulse ox? No   Eligibility Readm Mgmt   Discharge diagnosis Acute on chronic HFpEF:   Does the patient have one of the following disease processes/diagnoses(primary or secondary)? CHF   Does the patient have Home health ordered? No   Is there a DME ordered? No   Prep survey completed? Yes          AXEL DINH - Registered Nurse

## 2022-11-23 ENCOUNTER — READMISSION MANAGEMENT (OUTPATIENT)
Dept: CALL CENTER | Facility: HOSPITAL | Age: 72
End: 2022-11-23

## 2022-11-23 NOTE — OUTREACH NOTE
CHF Week 1 Survey    Flowsheet Row Responses   [facility] facility patient discharged from? [facility]   Does the patient have one of the following disease processes/diagnoses(primary or secondary)? CHF   CHF Week 1 attempt successful? No   Unsuccessful attempts Attempt 1          LELAND NOBLES - Registered Nurse

## 2022-11-28 ENCOUNTER — READMISSION MANAGEMENT (OUTPATIENT)
Dept: CALL CENTER | Facility: HOSPITAL | Age: 72
End: 2022-11-28

## 2022-11-28 NOTE — OUTREACH NOTE
CHF Week 1 Survey    Flowsheet Row Responses   [facility] facility patient discharged from? [facility]   Does the patient have one of the following disease processes/diagnoses(primary or secondary)? CHF   CHF Week 1 attempt successful? No   Unsuccessful attempts Attempt 2   Discharge diagnosis Acute on chronic HFpEF:          SUSAN BRAUN - Registered Nurse

## 2022-11-29 ENCOUNTER — READMISSION MANAGEMENT (OUTPATIENT)
Dept: CALL CENTER | Facility: HOSPITAL | Age: 72
End: 2022-11-29

## 2022-11-29 NOTE — OUTREACH NOTE
CHF Week 1 Survey    Flowsheet Row Responses   [facility] facility patient discharged from? [facility]   Does the patient have one of the following disease processes/diagnoses(primary or secondary)? CHF   CHF Week 1 attempt successful? No   Unsuccessful attempts Attempt 3          MATT SHORT - Registered Nurse

## 2023-02-13 ENCOUNTER — TELEPHONE (OUTPATIENT)
Dept: ORTHOPEDIC SURGERY | Facility: CLINIC | Age: 73
End: 2023-02-13

## 2023-02-13 NOTE — TELEPHONE ENCOUNTER
Caller: PATIENT     Relationship to patient:SELF       Best call back number: [phone]    Chief complaint: LEFT KNEE PAIN     Type of visit: LEFT KNEE CORTISONE INJECTION    Requested date: ASAP     I

## 2023-02-18 ENCOUNTER — APPOINTMENT (OUTPATIENT)
Dept: CT IMAGING | Facility: HOSPITAL | Age: 73
End: 2023-02-18
Payer: MEDICARE

## 2023-02-18 ENCOUNTER — APPOINTMENT (OUTPATIENT)
Dept: GENERAL RADIOLOGY | Facility: HOSPITAL | Age: 73
End: 2023-02-18
Payer: MEDICARE

## 2023-02-18 ENCOUNTER — HOSPITAL ENCOUNTER (EMERGENCY)
Facility: HOSPITAL | Age: 73
Discharge: HOME | End: 2023-02-18
Attending: EMERGENCY MEDICINE | Admitting: EMERGENCY MEDICINE
Payer: MEDICARE

## 2023-02-18 VITALS
TEMPERATURE: 100.2 F | BODY MASS INDEX: 39.16 KG/M2 | HEART RATE: 78 BPM | HEIGHT: 63 IN | RESPIRATION RATE: 16 BRPM | OXYGEN SATURATION: 94 % | DIASTOLIC BLOOD PRESSURE: 71 MMHG | SYSTOLIC BLOOD PRESSURE: 147 MMHG | WEIGHT: 221 LBS

## 2023-02-18 DIAGNOSIS — I50.9 ACUTE ON CHRONIC CONGESTIVE HEART FAILURE, UNSPECIFIED HEART FAILURE TYPE: ICD-10-CM

## 2023-02-18 DIAGNOSIS — A08.4 VIRAL GASTROENTERITIS: Primary | ICD-10-CM

## 2023-02-18 LAB
ALBUMIN SERPL-MCNC: 3.8 G/DL (ref 3.5–5.2)
ALBUMIN/GLOB SERPL: 1.2 G/DL
ALP SERPL-CCNC: 84 U/L (ref 39–117)
ALT SERPL W P-5'-P-CCNC: 20 U/L (ref 1–33)
ANION GAP SERPL CALCULATED.3IONS-SCNC: 12.5 MMOL/L (ref 5–15)
AST SERPL-CCNC: 15 U/L (ref 1–32)
BACTERIA UR QL AUTO: ABNORMAL /HPF
BASOPHILS # BLD AUTO: 0.02 10*3/MM3 (ref 0–0.2)
BASOPHILS NFR BLD AUTO: 0.3 % (ref 0–1.5)
BILIRUB SERPL-MCNC: 0.7 MG/DL (ref 0–1.2)
BILIRUB UR QL STRIP: NEGATIVE
BUN SERPL-MCNC: 13 MG/DL (ref 8–23)
BUN/CREAT SERPL: 15.3 (ref 7–25)
CALCIUM SPEC-SCNC: 9.2 MG/DL (ref 8.6–10.5)
CHLORIDE SERPL-SCNC: 102 MMOL/L (ref 98–107)
CLARITY UR: CLEAR
CO2 SERPL-SCNC: 20.5 MMOL/L (ref 22–29)
COLOR UR: YELLOW
CREAT SERPL-MCNC: 0.85 MG/DL (ref 0.57–1)
D DIMER PPP FEU-MCNC: 0.99 MCGFEU/ML (ref 0–0.71)
DEPRECATED RDW RBC AUTO: 50.9 FL (ref 37–54)
EGFRCR SERPLBLD CKD-EPI 2021: 73.4 ML/MIN/1.73
EOSINOPHIL # BLD AUTO: 0.18 10*3/MM3 (ref 0–0.4)
EOSINOPHIL NFR BLD AUTO: 2.3 % (ref 0.3–6.2)
ERYTHROCYTE [DISTWIDTH] IN BLOOD BY AUTOMATED COUNT: 14.2 % (ref 12.3–15.4)
FLUAV RNA RESP QL NAA+PROBE: NOT DETECTED
FLUBV RNA RESP QL NAA+PROBE: NOT DETECTED
GEN 5 2HR TROPONIN T REFLEX: 20 NG/L
GLOBULIN UR ELPH-MCNC: 3.1 GM/DL
GLUCOSE SERPL-MCNC: 154 MG/DL (ref 65–99)
GLUCOSE UR STRIP-MCNC: NEGATIVE MG/DL
HCT VFR BLD AUTO: 30.6 % (ref 34–46.6)
HGB BLD-MCNC: 10.1 G/DL (ref 12–15.9)
HGB UR QL STRIP.AUTO: ABNORMAL
HOLD SPECIMEN: NORMAL
HOLD SPECIMEN: NORMAL
HYALINE CASTS UR QL AUTO: ABNORMAL /LPF
IMM GRANULOCYTES # BLD AUTO: 0.04 10*3/MM3 (ref 0–0.05)
IMM GRANULOCYTES NFR BLD AUTO: 0.5 % (ref 0–0.5)
KETONES UR QL STRIP: NEGATIVE
LEUKOCYTE ESTERASE UR QL STRIP.AUTO: NEGATIVE
LYMPHOCYTES # BLD AUTO: 0.92 10*3/MM3 (ref 0.7–3.1)
LYMPHOCYTES NFR BLD AUTO: 11.6 % (ref 19.6–45.3)
MCH RBC QN AUTO: 32.2 PG (ref 26.6–33)
MCHC RBC AUTO-ENTMCNC: 33 G/DL (ref 31.5–35.7)
MCV RBC AUTO: 97.5 FL (ref 79–97)
MONOCYTES # BLD AUTO: 0.81 10*3/MM3 (ref 0.1–0.9)
MONOCYTES NFR BLD AUTO: 10.2 % (ref 5–12)
NEUTROPHILS NFR BLD AUTO: 5.99 10*3/MM3 (ref 1.7–7)
NEUTROPHILS NFR BLD AUTO: 75.1 % (ref 42.7–76)
NITRITE UR QL STRIP: NEGATIVE
NT-PROBNP SERPL-MCNC: 2962 PG/ML (ref 0–900)
PH UR STRIP.AUTO: <=5 [PH] (ref 4.5–8)
PLATELET # BLD AUTO: 180 10*3/MM3 (ref 140–450)
PMV BLD AUTO: 10.6 FL (ref 6–12)
POTASSIUM SERPL-SCNC: 3.8 MMOL/L (ref 3.5–5.2)
PROCALCITONIN SERPL-MCNC: 0.05 NG/ML (ref 0–0.25)
PROT SERPL-MCNC: 6.9 G/DL (ref 6–8.5)
PROT UR QL STRIP: ABNORMAL
RBC # BLD AUTO: 3.14 10*6/MM3 (ref 3.77–5.28)
RBC # UR STRIP: ABNORMAL /HPF
REF LAB TEST METHOD: ABNORMAL
SARS-COV-2 RNA RESP QL NAA+PROBE: NOT DETECTED
SODIUM SERPL-SCNC: 135 MMOL/L (ref 136–145)
SP GR UR STRIP: 1.01 (ref 1–1.03)
SQUAMOUS #/AREA URNS HPF: ABNORMAL /HPF
TROPONIN T DELTA: 2 NG/L
TROPONIN T SERPL HS-MCNC: 18 NG/L
UROBILINOGEN UR QL STRIP: ABNORMAL
WBC # UR STRIP: ABNORMAL /HPF
WBC NRBC COR # BLD: 7.96 10*3/MM3 (ref 3.4–10.8)
WHOLE BLOOD HOLD COAG: NORMAL
WHOLE BLOOD HOLD SPECIMEN: NORMAL

## 2023-02-18 PROCEDURE — 84145 PROCALCITONIN (PCT): CPT | Performed by: EMERGENCY MEDICINE

## 2023-02-18 PROCEDURE — 83880 ASSAY OF NATRIURETIC PEPTIDE: CPT | Performed by: EMERGENCY MEDICINE

## 2023-02-18 PROCEDURE — 99284 EMERGENCY DEPT VISIT MOD MDM: CPT

## 2023-02-18 PROCEDURE — 25010000002 FUROSEMIDE PER 20 MG: Performed by: EMERGENCY MEDICINE

## 2023-02-18 PROCEDURE — 93005 ELECTROCARDIOGRAM TRACING: CPT | Performed by: EMERGENCY MEDICINE

## 2023-02-18 PROCEDURE — 0 IOPAMIDOL PER 1 ML: Performed by: EMERGENCY MEDICINE

## 2023-02-18 PROCEDURE — 81001 URINALYSIS AUTO W/SCOPE: CPT | Performed by: EMERGENCY MEDICINE

## 2023-02-18 PROCEDURE — 25010000002 ONDANSETRON PER 1 MG: Performed by: EMERGENCY MEDICINE

## 2023-02-18 PROCEDURE — 80053 COMPREHEN METABOLIC PANEL: CPT | Performed by: EMERGENCY MEDICINE

## 2023-02-18 PROCEDURE — 36415 COLL VENOUS BLD VENIPUNCTURE: CPT

## 2023-02-18 PROCEDURE — 84484 ASSAY OF TROPONIN QUANT: CPT | Performed by: EMERGENCY MEDICINE

## 2023-02-18 PROCEDURE — 96374 THER/PROPH/DIAG INJ IV PUSH: CPT

## 2023-02-18 PROCEDURE — 71275 CT ANGIOGRAPHY CHEST: CPT

## 2023-02-18 PROCEDURE — 96375 TX/PRO/DX INJ NEW DRUG ADDON: CPT

## 2023-02-18 PROCEDURE — 87636 SARSCOV2 & INF A&B AMP PRB: CPT | Performed by: EMERGENCY MEDICINE

## 2023-02-18 PROCEDURE — 93010 ELECTROCARDIOGRAM REPORT: CPT | Performed by: INTERNAL MEDICINE

## 2023-02-18 PROCEDURE — 85379 FIBRIN DEGRADATION QUANT: CPT | Performed by: EMERGENCY MEDICINE

## 2023-02-18 PROCEDURE — 85025 COMPLETE CBC W/AUTO DIFF WBC: CPT | Performed by: EMERGENCY MEDICINE

## 2023-02-18 RX ORDER — ONDANSETRON HYDROCHLORIDE 8 MG/1
8 TABLET, FILM COATED ORAL EVERY 8 HOURS PRN
Qty: 10 TABLET | Refills: 0 | Status: SHIPPED | OUTPATIENT
Start: 2023-02-18 | End: 2023-02-21

## 2023-02-18 RX ORDER — SODIUM CHLORIDE 0.9 % (FLUSH) 0.9 %
10 SYRINGE (ML) INJECTION AS NEEDED
Status: DISCONTINUED | OUTPATIENT
Start: 2023-02-18 | End: 2023-02-19 | Stop reason: HOSPADM

## 2023-02-18 RX ORDER — LOSARTAN POTASSIUM 50 MG/1
50 TABLET ORAL DAILY
COMMUNITY

## 2023-02-18 RX ORDER — FUROSEMIDE 40 MG/1
40 TABLET ORAL 2 TIMES DAILY
COMMUNITY

## 2023-02-18 RX ORDER — ACETAMINOPHEN 500 MG
1000 TABLET ORAL ONCE
Status: COMPLETED | OUTPATIENT
Start: 2023-02-18 | End: 2023-02-18

## 2023-02-18 RX ORDER — ONDANSETRON 2 MG/ML
4 INJECTION INTRAMUSCULAR; INTRAVENOUS ONCE
Status: COMPLETED | OUTPATIENT
Start: 2023-02-18 | End: 2023-02-18

## 2023-02-18 RX ORDER — NAPROXEN 500 MG/1
500 TABLET ORAL 2 TIMES DAILY WITH MEALS
COMMUNITY

## 2023-02-18 RX ORDER — FUROSEMIDE 10 MG/ML
40 INJECTION INTRAMUSCULAR; INTRAVENOUS ONCE
Status: COMPLETED | OUTPATIENT
Start: 2023-02-18 | End: 2023-02-18

## 2023-02-18 RX ADMIN — ACETAMINOPHEN 1000 MG: 500 TABLET, FILM COATED ORAL at 22:43

## 2023-02-18 RX ADMIN — ONDANSETRON 4 MG: 2 INJECTION INTRAMUSCULAR; INTRAVENOUS at 21:09

## 2023-02-18 RX ADMIN — IOPAMIDOL 100 ML: 755 INJECTION, SOLUTION INTRAVENOUS at 22:34

## 2023-02-18 RX ADMIN — FUROSEMIDE 40 MG: 10 INJECTION, SOLUTION INTRAMUSCULAR; INTRAVENOUS at 23:19

## 2023-02-19 LAB — QT INTERVAL: 468 MS

## 2023-02-19 NOTE — ED PROVIDER NOTES
Subjective   History of Present Illness  History of Present Illness    Chief complaint: Short of air, vomiting and diarrhea    Location: Home    Quality/Severity: Nonbloody, nonbilious    Timing/Onset/Duration: Short of breath started yesterday    Modifying Factors: Short of breath worse today, worse with exertion    Associated Symptoms: Patient complains of 8/10 headache.  Patient has had chills but no fever.  Patient's had no cough sore throat earache or nasal congestion.  Mild chest pain.  No abdominal pain.  Nonbloody diarrhea.  No burning on urination    Narrative: This 72-year-old female with history of GERD, A-fib, kidney disease, thyroid disease, elevated cholesterol, type 2 diabetes, sleep apnea, and hypertension, who has never smoked and does not drink alcohol or use illicit drugs, presents with shortness of breath, chills, vomiting and diarrhea.  The patient did not have her meds today, she states that she did not feel like it, she felt like she was going to throw up all day    PCP:Edith Chávez PA  Review of Systems   Constitutional: Positive for chills. Negative for fever.   HENT: Negative for congestion, ear pain and sore throat.    Respiratory: Positive for shortness of breath. Negative for cough.    Cardiovascular: Positive for chest pain.   Gastrointestinal: Positive for diarrhea, nausea and vomiting. Negative for abdominal pain.   Genitourinary: Negative for dysuria.   Musculoskeletal: Negative for back pain.   Skin: Negative for rash.   Neurological: Positive for weakness (Generalized) and headaches. Negative for numbness.   Psychiatric/Behavioral: Negative for confusion.        Medication List      ASK your doctor about these medications    atorvastatin 20 MG tablet  Commonly known as: LIPITOR     Calcium Carbonate+Vitamin D 600-200 MG-UNIT tablet  Commonly known as: RA Calcium Plus Vitamin D  Take 1 tablet by mouth 2 (Two) Times a Day.     citalopram 20 MG tablet  Commonly known as:  CeleXA     Diclofenac Sodium 1 % gel gel  Commonly known as: VOLTAREN     flecainide 50 MG tablet  Commonly known as: TAMBOCOR     glyBURIDE-metFORMIN 5-500 MG per tablet  Commonly known as: GLUCOVANCE     losartan 50 MG tablet  Commonly known as: COZAAR  Take 1 tablet by mouth Daily for 30 days.     methIMAzole 5 MG tablet  Commonly known as: TAPAZOLE  TAKE ONE TABLET BY MOUTH DAILY     metoprolol tartrate 50 MG tablet  Commonly known as: LOPRESSOR     omeprazole 40 MG capsule  Commonly known as: priLOSEC     rivaroxaban 20 MG tablet  Commonly known as: XARELTO     traMADol 50 MG tablet  Commonly known as: ULTRAM  Take 1 tablet by mouth Every 6 (Six) Hours As Needed for Moderate Pain .     vitamin B-12 500 MCG tablet  Commonly known as: CYANOCOBALAMIN  Take 1 tablet by mouth Daily.     Vitamin D3 50 MCG (2000 UT) tablet  1 tablet daily     vitamin E 400 UNIT capsule            Past Medical History:   Diagnosis Date   • A-fib (HCC)    • Arthritis    • Depression    • Disease of thyroid gland    • Elevated cholesterol    • GERD (gastroesophageal reflux disease)    • Headache    • Hypertension    • Kidney disease    • Sleep apnea    • Type 2 diabetes mellitus (HCC)        No Known Allergies    Past Surgical History:   Procedure Laterality Date   • CHOLECYSTECTOMY     • COLONOSCOPY N/A 4/29/2022    Procedure: COLONOSCOPY;  Surgeon: Parmjit Traore MD;  Location: [facility];  Service: Gastroenterology;  Laterality: N/A;  Diverticulosis   • HYSTERECTOMY  1996    bilateral oophorectomy for heavy bleeding. No HRT   • ROTATOR CUFF REPAIR      ACUTE   • SHOULDER SURGERY Bilateral     hAS HAD ROTATOR CUFF SURGERY ON BOTH SHOULDERS       Family History   Problem Relation Age of Onset   • Diabetes Mother    • Kidney disease Father    • Arthritis Father    • Kidney cancer Father    • Prostate cancer Father    • Cancer Other    • Diabetes Other    • Glaucoma Other    • Rheum arthritis Other    • Kidney disease Other     • Breast cancer Neg Hx        Social History     Socioeconomic History   • Marital status:    Tobacco Use   • Smoking status: Never   • Smokeless tobacco: Never   Vaping Use   • Vaping Use: Never used   Substance and Sexual Activity   • Alcohol use: No   • Drug use: No   • Sexual activity: Defer           Objective   Physical Exam  Vitals (BloodThe temperature is 100.2 °F, pulse 87, respirations 24, 180/88, room air pulse ox 94%) and nursing note reviewed.   Constitutional:       Appearance: She is well-developed.   HENT:      Head: Normocephalic and atraumatic.      Mouth/Throat:      Mouth: Mucous membranes are moist.   Cardiovascular:      Rate and Rhythm: Normal rate and regular rhythm.      Heart sounds: No murmur heard.    No friction rub. No gallop.   Pulmonary:      Effort: Pulmonary effort is normal.      Breath sounds: Normal breath sounds.   Chest:      Chest wall: No tenderness.   Abdominal:      General: Bowel sounds are normal.      Palpations: Abdomen is soft. There is no mass.      Tenderness: There is no abdominal tenderness. There is no guarding or rebound.   Musculoskeletal:      Cervical back: Normal range of motion and neck supple.      Right lower leg: No tenderness. No edema.      Left lower leg: No tenderness. No edema.   Skin:     General: Skin is warm and dry.   Neurological:      General: No focal deficit present.      Mental Status: She is alert and oriented to person, place, and time.         Procedures           ED Course  ED Course as of 02/18/23 2316   Sat Feb 18, 2023 2031 The serum glucose is 154.  The CO2 is 20.  The CMP is otherwise unremarkable    The hemoglobin is 10.1, hematocrit 30, CBC otherwise unremarkable     [RC]   2136 On 11/8/2022 the hemoglobin was 11.1.  The hemoglobin is lower today by a gram [RC]   2136 The high-sensitivity troponin is 18. [RC]   2144 The D-dimer is elevated 0.99    The BNP is elevated 2962 [RC]   2144 On 11/18/2022 the BNP is 2372 [RC]    2150 The COVID flu is is negative. [RC]   2150 The procalcitonin is normal at 0.05. [RC]   2254 The urine microscopic shows 6-12 red blood cells, 0-2 white blood cells, trace bacteria. [RC]   2305 The high-sensitivity troponin is 20.  The delta troponin is 2. [RC]      ED Course User Index  [RC] Chip Gomez MD      20:47 EST, 02/18/23:  EKG was obtained at 2040 and read by me at 2041.  There is evidence for sinus or ectopic atrial rhythm.  The rate is 81.  The AL is 228 ms.  The QRS intervals unremarkable.  The QT corrected is prolonged at 544 ms.  There appears to be a flutter type rhythm in lead I, III, aVL, aVF V1 but sinus rhythm in other leads.  We will obtain repeat EKG.  The patient has no acute ST elevation or depression     22:57 EST, 02/18/23:  Repeat EKG was obtained at 2254 and read by me at 2256.  The repeat EKG shows a normal sinus rhythm with a rate of 78.  The there is a normal axis with no hypertrophy.  The AL is prolonged at 225 ms.  The QRS and QT intervals are unremarkable.  There is no ectopy.  There is borderline T wave abnormalities.  There is no acute ST elevation or depression.  This EKG is a much better quality EKG     22:59 EST, 02/18/23:  The patient was reassessed.  She tolerated clear liquids.  She feels better.  Her vital signs were reviewed and are stable.  The patient's diagnosis of acute gastroenteritis and congestive heart failure was discussed with her.  The patient will be given a prescription for Zofran.  She should drink clear liquids for 24 to 48 hours, then advance diet as tolerated.  The patient should be compliant with her medications.  Patient should follow-up with Dr. Edith dotson on Monday.  She should return the emergency department there is worsening shortness of breath, vomiting not controlled with Zofran, vomiting blood, bloody diarrhea, worse in any way at all.  All the patient's questions were answered the patient will be discharged in good condition                            MDM    Final diagnoses:   Viral gastroenteritis   Acute on chronic congestive heart failure, unspecified heart failure type (HCC)       ED Disposition  ED Disposition     None          No follow-up provider specified.       Medication List      No changes were made to your prescriptions during this visit.          Chip Gomez MD  02/18/23 8126       Chip Gomez MD  02/18/23 3646

## 2023-02-19 NOTE — DISCHARGE INSTRUCTIONS
Take your medication as prescribed.  Clear liquids for 24 to 48 hours, then advance diet as tolerated.  Take the Zofran as needed as directed for vomiting.  Follow-up with Edith Chávez on Monday.  Return to the emergency department if there is vomiting not controlled with Zofran, worsening shortness of breath, worse in any way at all.

## 2023-02-21 ENCOUNTER — CLINICAL SUPPORT (OUTPATIENT)
Dept: ORTHOPEDIC SURGERY | Facility: CLINIC | Age: 73
End: 2023-02-21
Payer: MEDICARE

## 2023-02-21 VITALS — WEIGHT: 221 LBS | HEIGHT: 63 IN | BODY MASS INDEX: 39.16 KG/M2

## 2023-02-21 DIAGNOSIS — M17.12 PRIMARY OSTEOARTHRITIS OF LEFT KNEE: Primary | ICD-10-CM

## 2023-02-21 PROCEDURE — 20610 DRAIN/INJ JOINT/BURSA W/O US: CPT | Performed by: NURSE PRACTITIONER

## 2023-02-21 RX ORDER — TRIAMCINOLONE ACETONIDE 40 MG/ML
80 INJECTION, SUSPENSION INTRA-ARTICULAR; INTRAMUSCULAR
Status: COMPLETED | OUTPATIENT
Start: 2023-02-21 | End: 2023-02-21

## 2023-02-21 RX ORDER — LIDOCAINE HYDROCHLORIDE 10 MG/ML
8 INJECTION, SOLUTION EPIDURAL; INFILTRATION; INTRACAUDAL; PERINEURAL
Status: COMPLETED | OUTPATIENT
Start: 2023-02-21 | End: 2023-02-21

## 2023-02-21 RX ADMIN — TRIAMCINOLONE ACETONIDE 80 MG: 40 INJECTION, SUSPENSION INTRA-ARTICULAR; INTRAMUSCULAR at 10:25

## 2023-02-21 RX ADMIN — LIDOCAINE HYDROCHLORIDE 8 ML: 10 INJECTION, SOLUTION EPIDURAL; INFILTRATION; INTRACAUDAL; PERINEURAL at 10:25

## 2023-02-21 NOTE — PROGRESS NOTES
Large Joint Arthrocentesis: L knee  Date/Time: 2/21/2023 10:25 AM  Consent given by: patient  Site marked: site marked  Timeout: Immediately prior to procedure a time out was called to verify the correct patient, procedure, equipment, support staff and site/side marked as required   Supporting Documentation  Indications: pain   Procedure Details  Location: knee - L knee  Preparation: Patient was prepped and draped in the usual sterile fashion  Needle size: 22 G  Approach: superior  Medications administered: 8 mL lidocaine PF 1% 1 %; 80 mg triamcinolone acetonide 40 MG/ML  Patient tolerance: patient tolerated the procedure well with no immediate complications          CC: DJD left knee, corticosteroid injection 10/3/2022    Interval history: Lisa Gavin returns to clinic with spouse for follow-up of her left knee.  Maximal tenderness present along the medial compartment pain present over the last 1 to 2 weeks with heat off the medial aspect of the knee.  Pain with ambulatory activities she does note decreased symptoms when she is nonweightbearing.  Denies that the knee is locking, catching or giving way.  She has resumed fracture boot due to unrelated issues with the ankle.  Denies any other concerns present.    Exam:  Left knee examined  Knee range of motion 0 to 125 degrees  Stable to varus and valgus stress at 0 degrees and 30 degrees  Positive tenderness along the medial compartment  Positive active patellar compression test, positive crepitus throughout arc of motion  Moderate swelling, negative erythema  Mildly positive medial Carla's, negative lateral Carla's  1+ anterior Lachman  Negative effusion  Positive sensation anterior posterior aspect of the knee     Assessment: Primary osteoarthritis left knee    Plan:  1.  Discussed plan of care with patient and family.  She does wish to proceed corticosteroid injection left knee.  Discussed application of ice injection site this evening.  Discussed if  she is experiencing any erythema, worsening of pain or swelling any to see her back immediately.  Encouraged to call with any questions or concerns that they have.  All questions answered.

## 2023-02-28 RX ORDER — CHOLECALCIFEROL (VITAMIN D3) 50 MCG
TABLET ORAL
Qty: 90 TABLET | Refills: 3 | Status: SHIPPED | OUTPATIENT
Start: 2023-02-28

## 2023-04-13 NOTE — PROGRESS NOTES
Subjective     REASON FOR FOLLOW-UP: Anemia  Provide an opinion on any further workup or treatment                             REQUESTING PHYSICIAN: Shalom    RECORDS OBTAINED:  Records of the patients history including those obtained from the referring provider were reviewed and summarized in detail.    HISTORY OF PRESENT ILLNESS:  The patient is a 72 y.o. year old female who is here for an opinion about the above issue.    History of Present Illness   This is a very pleasant 72-year-old lady with type 2 diabetes mellitus, CKD, arthritis on NSAIDs, atrial fibrillation on anticoagulation with Xarelto referred for evaluation of anemia.  Reviewing the labs available, the patient has had a stable anemia since at least October 2019 with hemoglobin running between 10.2-10.6.  She has persistently normal white blood cell count with differential and normal platelet count.  Additional recent evaluation by endocrinology showed a low iron saturation 5% with ferritin 57, B12 260, folate 7.7, nonsuppressed haptoglobin, negative SPEP/DEBORAH for monoclonality.  The patient denies need of blood transfusions.  She denies significant fatigue, lightheadedness, dizziness or shortness of breath.  She denies blood in the stool and reports recent negative colonoscopy.  She has not had a EGD but denies dyspepsia, abdominal pain or melanotic stool.    The patient returned today for follow-up.  She was admitted in November with congestive heart failure after stopping her diuretic.  She is felt better since diuresis and reinitiation of her diuretic.  She denies lightheadedness or dizziness.  She is not seeing blood in the stool.    Past Medical History:   Diagnosis Date   • A-fib    • Arthritis    • Congestive heart failure (CHF)    • Depression    • Disease of thyroid gland    • Elevated cholesterol    • GERD (gastroesophageal reflux disease)    • Headache    • Hypertension    • Kidney disease    • Sleep apnea    • Type 2 diabetes mellitus          Past Surgical History:   Procedure Laterality Date   • CHOLECYSTECTOMY     • COLONOSCOPY N/A 4/29/2022    Procedure: COLONOSCOPY;  Surgeon: Parmjit Traore MD;  Location: [facility];  Service: Gastroenterology;  Laterality: N/A;  Diverticulosis   • HYSTERECTOMY  1996    bilateral oophorectomy for heavy bleeding. No HRT   • ROTATOR CUFF REPAIR      ACUTE   • SHOULDER SURGERY Bilateral     hAS HAD ROTATOR CUFF SURGERY ON BOTH SHOULDERS        Current Outpatient Medications on File Prior to Visit   Medication Sig Dispense Refill   • atorvastatin (LIPITOR) 20 MG tablet 1 tablet Every Night.     • Calcium Carbonate+Vitamin D (RA Calcium Plus Vitamin D) 600-200 MG-UNIT tablet Take 1 tablet by mouth 2 (Two) Times a Day.     • Cholecalciferol (Vitamin D) 50 MCG (2000 UT) tablet TAKE 1 TABLET BY MOUTH EVERY DAY 90 tablet 3   • Cholecalciferol 50 MCG (2000 UT) tablet Take 1 tablet by mouth Daily.     • citalopram (CeleXA) 20 MG tablet Take 2 tablets by mouth Every Night.     • flecainide (TAMBOCOR) 100 MG tablet Take 1 tablet by mouth 2 (Two) Times a Day.     • furosemide (LASIX) 40 MG tablet Take 1 tablet by mouth 2 (Two) Times a Day.     • glyBURIDE-metFORMIN (GLUCOVANCE) 5-500 MG per tablet Take 2 tablets by mouth 2 (Two) Times a Day.     • losartan (COZAAR) 50 MG tablet Take 1 tablet by mouth Daily.     • methIMAzole (TAPAZOLE) 5 MG tablet TAKE ONE TABLET BY MOUTH DAILY 30 tablet 0   • metoprolol tartrate (LOPRESSOR) 50 MG tablet 1 tablet 2 (Two) Times a Day.     • naproxen (NAPROSYN) 500 MG tablet Take 1 tablet by mouth 2 (Two) Times a Day With Meals.     • omeprazole (priLOSEC) 40 MG capsule Take 1 capsule by mouth Daily.     • rivaroxaban (XARELTO) tablet Take 1 tablet by mouth Daily.     • vitamin B-12 (CYANOCOBALAMIN) 500 MCG tablet Take 1 tablet by mouth Daily. 30 tablet 3   • vitamin E 400 UNIT capsule Take 1 capsule by mouth Daily.     • [DISCONTINUED] flecainide (TAMBOCOR) 50 MG tablet Take 100 mg  "by mouth 2 (Two) Times a Day.       No current facility-administered medications on file prior to visit.        ALLERGIES:  No Known Allergies     Social History     Socioeconomic History   • Marital status:    Tobacco Use   • Smoking status: Never     Passive exposure: Never   • Smokeless tobacco: Never   Vaping Use   • Vaping Use: Never used   Substance and Sexual Activity   • Alcohol use: No   • Drug use: No   • Sexual activity: Defer        Family History   Problem Relation Age of Onset   • Diabetes Mother    • Kidney disease Father    • Arthritis Father    • Kidney cancer Father    • Prostate cancer Father    • Cancer Other    • Diabetes Other    • Glaucoma Other    • Rheum arthritis Other    • Kidney disease Other    • Breast cancer Neg Hx         Review of Systems   Constitutional: Positive for fatigue. Negative for activity change, fever and unexpected weight change.   HENT: Negative.    Respiratory: Negative.    Cardiovascular: Negative.    Gastrointestinal: Negative.    Musculoskeletal: Positive for arthralgias and gait problem.   Skin: Negative.    Hematological: Negative.    Psychiatric/Behavioral: Negative.    ROS unchanged- 4/19/2023      Objective     Vitals:    04/19/23 1435   BP: 108/66   Pulse: 65   Resp: 16   Temp: 98 °F (36.7 °C)   TempSrc: Infrared   SpO2: 96%   Weight: 90.9 kg (200 lb 4.8 oz)   Height: 160 cm (62.99\")   PainSc: 0-No pain         4/19/2023     2:39 PM   Current Status   ECOG score 0       Physical Exam    CONSTITUTIONAL: pleasant well-developed adult woman  HEENT: no icterus, no thrush, moist membranes  LYMPH: no cervical or supraclavicular lad  CV: Irregular, S1S2, no murmur  RESP: cta bilat, no wheezing, no rales  GI: soft, non-tender, no splenomegaly, +bs  MUSC: no edema   NEURO: alert and oriented x3, normal strength  PSYCH: normal mood and affect  Exam unchanged-4/19/2023  RECENT LABS:  Hematology WBC   Date Value Ref Range Status   04/19/2023 6.44 3.40 - 10.80 " 10*3/mm3 Final   06/16/2022 5.0 3.4 - 10.8 x10E3/uL Final     RBC   Date Value Ref Range Status   04/19/2023 3.54 (L) 3.77 - 5.28 10*6/mm3 Final   06/16/2022 3.38 (L) 3.77 - 5.28 x10E6/uL Final     Hemoglobin   Date Value Ref Range Status   04/19/2023 11.5 (L) 12.0 - 15.9 g/dL Final     Hematocrit   Date Value Ref Range Status   04/19/2023 35.4 34.0 - 46.6 % Final     Platelets   Date Value Ref Range Status   04/19/2023 217 140 - 450 10*3/mm3 Final      Ferritin 342/iron sat 38%    Assessment & Plan     *Normocytic, normochromic anemia- hemoglobin stable 11.5 and stable since 2019; normal white blood cell differential and platelet count  *Mild low iron stores on an oral supplement-ferritin has improved 342/iron sat 38%  *Mild low B12 on oral supplement  *CKD 3  *Diabetes mellitus    Hematology plan/recommendations:  1. chronic anemia secondary to CKD -hemoglobin improved 11.5  2. I will defer care back to her PCP with recommendation to monitor her CBC ferritin iron profile 2-3 times per year; I would be happy to see the patient back if her blood count worsens again

## 2023-04-19 ENCOUNTER — LAB (OUTPATIENT)
Dept: LAB | Facility: HOSPITAL | Age: 73
End: 2023-04-19
Payer: MEDICARE

## 2023-04-19 ENCOUNTER — OFFICE VISIT (OUTPATIENT)
Dept: ONCOLOGY | Facility: CLINIC | Age: 73
End: 2023-04-19
Payer: MEDICARE

## 2023-04-19 VITALS
RESPIRATION RATE: 16 BRPM | TEMPERATURE: 98 F | HEART RATE: 65 BPM | HEIGHT: 63 IN | WEIGHT: 200.3 LBS | BODY MASS INDEX: 35.49 KG/M2 | OXYGEN SATURATION: 96 % | DIASTOLIC BLOOD PRESSURE: 66 MMHG | SYSTOLIC BLOOD PRESSURE: 108 MMHG

## 2023-04-19 DIAGNOSIS — N18.31 ANEMIA IN STAGE 3A CHRONIC KIDNEY DISEASE: ICD-10-CM

## 2023-04-19 DIAGNOSIS — D50.9 IRON DEFICIENCY ANEMIA, UNSPECIFIED IRON DEFICIENCY ANEMIA TYPE: Primary | ICD-10-CM

## 2023-04-19 DIAGNOSIS — D63.1 ANEMIA IN STAGE 3A CHRONIC KIDNEY DISEASE: ICD-10-CM

## 2023-04-19 LAB
BASOPHILS # BLD AUTO: 0.02 10*3/MM3 (ref 0–0.2)
BASOPHILS NFR BLD AUTO: 0.3 % (ref 0–1.5)
DEPRECATED RDW RBC AUTO: 50.8 FL (ref 37–54)
EOSINOPHIL # BLD AUTO: 0.38 10*3/MM3 (ref 0–0.4)
EOSINOPHIL NFR BLD AUTO: 5.9 % (ref 0.3–6.2)
ERYTHROCYTE [DISTWIDTH] IN BLOOD BY AUTOMATED COUNT: 13.7 % (ref 12.3–15.4)
FERRITIN SERPL-MCNC: 342 NG/ML (ref 13–150)
HCT VFR BLD AUTO: 35.4 % (ref 34–46.6)
HGB BLD-MCNC: 11.5 G/DL (ref 12–15.9)
IMM GRANULOCYTES # BLD AUTO: 0.04 10*3/MM3 (ref 0–0.05)
IMM GRANULOCYTES NFR BLD AUTO: 0.6 % (ref 0–0.5)
IRON 24H UR-MRATE: 106 MCG/DL (ref 37–145)
IRON SATN MFR SERPL: 38 % (ref 20–50)
LYMPHOCYTES # BLD AUTO: 1.7 10*3/MM3 (ref 0.7–3.1)
LYMPHOCYTES NFR BLD AUTO: 26.4 % (ref 19.6–45.3)
MCH RBC QN AUTO: 32.5 PG (ref 26.6–33)
MCHC RBC AUTO-ENTMCNC: 32.5 G/DL (ref 31.5–35.7)
MCV RBC AUTO: 100 FL (ref 79–97)
MONOCYTES # BLD AUTO: 0.53 10*3/MM3 (ref 0.1–0.9)
MONOCYTES NFR BLD AUTO: 8.2 % (ref 5–12)
NEUTROPHILS NFR BLD AUTO: 3.77 10*3/MM3 (ref 1.7–7)
NEUTROPHILS NFR BLD AUTO: 58.6 % (ref 42.7–76)
PLATELET # BLD AUTO: 217 10*3/MM3 (ref 140–450)
PMV BLD AUTO: 10.1 FL (ref 6–12)
RBC # BLD AUTO: 3.54 10*6/MM3 (ref 3.77–5.28)
TIBC SERPL-MCNC: 280 MCG/DL (ref 298–536)
UIBC SERPL-MCNC: 174 MCG/DL (ref 112–346)
VIT B12 BLD-MCNC: 528 PG/ML (ref 211–946)
WBC NRBC COR # BLD: 6.44 10*3/MM3 (ref 3.4–10.8)

## 2023-04-19 PROCEDURE — 99213 OFFICE O/P EST LOW 20 MIN: CPT | Performed by: INTERNAL MEDICINE

## 2023-04-19 PROCEDURE — 36415 COLL VENOUS BLD VENIPUNCTURE: CPT

## 2023-04-19 PROCEDURE — 83550 IRON BINDING TEST: CPT | Performed by: INTERNAL MEDICINE

## 2023-04-19 PROCEDURE — 85025 COMPLETE CBC W/AUTO DIFF WBC: CPT | Performed by: INTERNAL MEDICINE

## 2023-04-19 PROCEDURE — 1126F AMNT PAIN NOTED NONE PRSNT: CPT | Performed by: INTERNAL MEDICINE

## 2023-04-19 PROCEDURE — 82607 VITAMIN B-12: CPT | Performed by: INTERNAL MEDICINE

## 2023-04-19 PROCEDURE — 83540 ASSAY OF IRON: CPT | Performed by: INTERNAL MEDICINE

## 2023-04-19 PROCEDURE — 82728 ASSAY OF FERRITIN: CPT | Performed by: INTERNAL MEDICINE

## 2023-04-19 RX ORDER — FLECAINIDE ACETATE 100 MG/1
1 TABLET ORAL 2 TIMES DAILY
COMMUNITY
Start: 2023-04-04

## 2023-04-19 NOTE — LETTER
April 19, 2023       No Recipients    Patient: Lisa Gavin   YOB: 1950   Date of Visit: 4/19/2023       Dear Dr. Yoon Recipients:    Thank you for referring Lisa Gavin to me for evaluation. Below are the relevant portions of my assessment and plan of care.    If you have questions, please do not hesitate to call me. I look forward to following Lisa along with you.         Sincerely,        Stiven Simon MD        CC:   No Recipients    Stiven Simon MD  04/19/23 1504  Sign when Signing Visit    Subjective      REASON FOR FOLLOW-UP: Anemia  Provide an opinion on any further workup or treatment                             REQUESTING PHYSICIAN: Shalom    RECORDS OBTAINED:  Records of the patients history including those obtained from the referring provider were reviewed and summarized in detail.    HISTORY OF PRESENT ILLNESS:  The patient is a 72 y.o. year old female who is here for an opinion about the above issue.    History of Present Illness   This is a very pleasant 72-year-old lady with type 2 diabetes mellitus, CKD, arthritis on NSAIDs, atrial fibrillation on anticoagulation with Xarelto referred for evaluation of anemia.  Reviewing the labs available, the patient has had a stable anemia since at least October 2019 with hemoglobin running between 10.2-10.6.  She has persistently normal white blood cell count with differential and normal platelet count.  Additional recent evaluation by endocrinology showed a low iron saturation 5% with ferritin 57, B12 260, folate 7.7, nonsuppressed haptoglobin, negative SPEP/DEBORAH for monoclonality.  The patient denies need of blood transfusions.  She denies significant fatigue, lightheadedness, dizziness or shortness of breath.  She denies blood in the stool and reports recent negative colonoscopy.  She has not had a EGD but denies dyspepsia, abdominal pain or melanotic stool.    The patient returned today for follow-up.  She was admitted in  November with congestive heart failure after stopping her diuretic.  She is felt better since diuresis and reinitiation of her diuretic.  She denies lightheadedness or dizziness.  She is not seeing blood in the stool.    Past Medical History:   Diagnosis Date   • A-fib    • Arthritis    • Congestive heart failure (CHF)    • Depression    • Disease of thyroid gland    • Elevated cholesterol    • GERD (gastroesophageal reflux disease)    • Headache    • Hypertension    • Kidney disease    • Sleep apnea    • Type 2 diabetes mellitus         Past Surgical History:   Procedure Laterality Date   • CHOLECYSTECTOMY     • COLONOSCOPY N/A 4/29/2022    Procedure: COLONOSCOPY;  Surgeon: Parmjit Traore MD;  Location: [facility];  Service: Gastroenterology;  Laterality: N/A;  Diverticulosis   • HYSTERECTOMY  1996    bilateral oophorectomy for heavy bleeding. No HRT   • ROTATOR CUFF REPAIR      ACUTE   • SHOULDER SURGERY Bilateral     hAS HAD ROTATOR CUFF SURGERY ON BOTH SHOULDERS        Current Outpatient Medications on File Prior to Visit   Medication Sig Dispense Refill   • atorvastatin (LIPITOR) 20 MG tablet 1 tablet Every Night.     • Calcium Carbonate+Vitamin D (RA Calcium Plus Vitamin D) 600-200 MG-UNIT tablet Take 1 tablet by mouth 2 (Two) Times a Day.     • Cholecalciferol (Vitamin D) 50 MCG (2000 UT) tablet TAKE 1 TABLET BY MOUTH EVERY DAY 90 tablet 3   • Cholecalciferol 50 MCG (2000 UT) tablet Take 1 tablet by mouth Daily.     • citalopram (CeleXA) 20 MG tablet Take 2 tablets by mouth Every Night.     • flecainide (TAMBOCOR) 100 MG tablet Take 1 tablet by mouth 2 (Two) Times a Day.     • furosemide (LASIX) 40 MG tablet Take 1 tablet by mouth 2 (Two) Times a Day.     • glyBURIDE-metFORMIN (GLUCOVANCE) 5-500 MG per tablet Take 2 tablets by mouth 2 (Two) Times a Day.     • losartan (COZAAR) 50 MG tablet Take 1 tablet by mouth Daily.     • methIMAzole (TAPAZOLE) 5 MG tablet TAKE ONE TABLET BY MOUTH DAILY 30  "tablet 0   • metoprolol tartrate (LOPRESSOR) 50 MG tablet 1 tablet 2 (Two) Times a Day.     • naproxen (NAPROSYN) 500 MG tablet Take 1 tablet by mouth 2 (Two) Times a Day With Meals.     • omeprazole (priLOSEC) 40 MG capsule Take 1 capsule by mouth Daily.     • rivaroxaban (XARELTO) tablet Take 1 tablet by mouth Daily.     • vitamin B-12 (CYANOCOBALAMIN) 500 MCG tablet Take 1 tablet by mouth Daily. 30 tablet 3   • vitamin E 400 UNIT capsule Take 1 capsule by mouth Daily.     • [DISCONTINUED] flecainide (TAMBOCOR) 50 MG tablet Take 100 mg by mouth 2 (Two) Times a Day.       No current facility-administered medications on file prior to visit.        ALLERGIES:  No Known Allergies     Social History     Socioeconomic History   • Marital status:    Tobacco Use   • Smoking status: Never     Passive exposure: Never   • Smokeless tobacco: Never   Vaping Use   • Vaping Use: Never used   Substance and Sexual Activity   • Alcohol use: No   • Drug use: No   • Sexual activity: Defer        Family History   Problem Relation Age of Onset   • Diabetes Mother    • Kidney disease Father    • Arthritis Father    • Kidney cancer Father    • Prostate cancer Father    • Cancer Other    • Diabetes Other    • Glaucoma Other    • Rheum arthritis Other    • Kidney disease Other    • Breast cancer Neg Hx         Review of Systems   Constitutional: Positive for fatigue. Negative for activity change, fever and unexpected weight change.   HENT: Negative.    Respiratory: Negative.    Cardiovascular: Negative.    Gastrointestinal: Negative.    Musculoskeletal: Positive for arthralgias and gait problem.   Skin: Negative.    Hematological: Negative.    Psychiatric/Behavioral: Negative.    ROS unchanged- 4/19/2023      Objective      Vitals:    04/19/23 1435   BP: 108/66   Pulse: 65   Resp: 16   Temp: 98 °F (36.7 °C)   TempSrc: Infrared   SpO2: 96%   Weight: 90.9 kg (200 lb 4.8 oz)   Height: 160 cm (62.99\")   PainSc: 0-No pain         " 4/19/2023     2:39 PM   Current Status   ECOG score 0       Physical Exam    CONSTITUTIONAL: pleasant well-developed adult woman  HEENT: no icterus, no thrush, moist membranes  LYMPH: no cervical or supraclavicular lad  CV: Irregular, S1S2, no murmur  RESP: cta bilat, no wheezing, no rales  GI: soft, non-tender, no splenomegaly, +bs  MUSC: no edema   NEURO: alert and oriented x3, normal strength  PSYCH: normal mood and affect  Exam unchanged-4/19/2023  RECENT LABS:  Hematology WBC   Date Value Ref Range Status   04/19/2023 6.44 3.40 - 10.80 10*3/mm3 Final   06/16/2022 5.0 3.4 - 10.8 x10E3/uL Final     RBC   Date Value Ref Range Status   04/19/2023 3.54 (L) 3.77 - 5.28 10*6/mm3 Final   06/16/2022 3.38 (L) 3.77 - 5.28 x10E6/uL Final     Hemoglobin   Date Value Ref Range Status   04/19/2023 11.5 (L) 12.0 - 15.9 g/dL Final     Hematocrit   Date Value Ref Range Status   04/19/2023 35.4 34.0 - 46.6 % Final     Platelets   Date Value Ref Range Status   04/19/2023 217 140 - 450 10*3/mm3 Final      Ferritin 342/iron sat 38%    Assessment & Plan     *Normocytic, normochromic anemia- hemoglobin stable 11.5 and stable since 2019; normal white blood cell differential and platelet count  *Mild low iron stores on an oral supplement-ferritin has improved 342/iron sat 38%  *Mild low B12 on oral supplement  *CKD 3  *Diabetes mellitus    Hematology plan/recommendations:  chronic anemia secondary to CKD -hemoglobin improved 11.5  I will defer care back to her PCP with recommendation to monitor her CBC ferritin iron profile 2-3 times per year; I would be happy to see the patient back if her blood count worsens again

## 2023-06-08 ENCOUNTER — TRANSCRIBE ORDERS (OUTPATIENT)
Dept: ADMINISTRATIVE | Facility: HOSPITAL | Age: 73
End: 2023-06-08
Payer: MEDICARE

## 2023-06-08 DIAGNOSIS — Z12.31 VISIT FOR SCREENING MAMMOGRAM: Primary | ICD-10-CM

## 2023-06-15 ENCOUNTER — HOSPITAL ENCOUNTER (OUTPATIENT)
Dept: MAMMOGRAPHY | Facility: HOSPITAL | Age: 73
Discharge: HOME | End: 2023-06-15
Admitting: PHYSICIAN ASSISTANT
Payer: MEDICARE

## 2023-06-15 DIAGNOSIS — Z12.31 VISIT FOR SCREENING MAMMOGRAM: ICD-10-CM

## 2023-06-15 PROCEDURE — 77067 SCR MAMMO BI INCL CAD: CPT

## 2023-06-15 PROCEDURE — 77063 BREAST TOMOSYNTHESIS BI: CPT

## 2023-07-10 ENCOUNTER — TELEPHONE (OUTPATIENT)
Dept: ORTHOPEDIC SURGERY | Facility: CLINIC | Age: 73
End: 2023-07-10

## 2023-07-10 NOTE — TELEPHONE ENCOUNTER
Caller: Lisa Gavin    Relationship to patient: Self    Best call back number: [phone]    Chief complaint: LEFT KNEE     Type of visit: INJECTION    Requested date: ASAP

## 2023-07-24 ENCOUNTER — OFFICE VISIT (OUTPATIENT)
Dept: ORTHOPEDIC SURGERY | Facility: CLINIC | Age: 73
End: 2023-07-24
Payer: MEDICARE

## 2023-07-24 VITALS — WEIGHT: 203 LBS | HEIGHT: 63 IN | BODY MASS INDEX: 35.97 KG/M2

## 2023-07-24 DIAGNOSIS — M17.12 PRIMARY OSTEOARTHRITIS OF LEFT KNEE: Primary | ICD-10-CM

## 2023-07-24 RX ORDER — LIDOCAINE HYDROCHLORIDE 10 MG/ML
8 INJECTION, SOLUTION EPIDURAL; INFILTRATION; INTRACAUDAL; PERINEURAL
Status: COMPLETED | OUTPATIENT
Start: 2023-07-24 | End: 2023-07-24

## 2023-07-24 RX ORDER — TRIAMCINOLONE ACETONIDE 40 MG/ML
80 INJECTION, SUSPENSION INTRA-ARTICULAR; INTRAMUSCULAR
Status: COMPLETED | OUTPATIENT
Start: 2023-07-24 | End: 2023-07-24

## 2023-07-24 RX ADMIN — LIDOCAINE HYDROCHLORIDE 8 ML: 10 INJECTION, SOLUTION EPIDURAL; INFILTRATION; INTRACAUDAL; PERINEURAL at 14:19

## 2023-07-24 RX ADMIN — TRIAMCINOLONE ACETONIDE 80 MG: 40 INJECTION, SUSPENSION INTRA-ARTICULAR; INTRAMUSCULAR at 14:19

## 2023-07-24 NOTE — PROGRESS NOTES
Subjective:     Patient ID: Lisa Gavin is a 72 y.o. female.    Chief Complaint:  DJD left knee -corticosteroid injection 2/1/2023 left knee  History of Present Illness  Lisa Gavin returns to clinic with spouse for follow-up of her left knee.  Presents today to discuss corticosteroid injections left knee.  Denies any other concerns present.       Social History     Occupational History    Occupation:    Tobacco Use    Smoking status: Never     Passive exposure: Never    Smokeless tobacco: Never   Vaping Use    Vaping Use: Never used   Substance and Sexual Activity    Alcohol use: No    Drug use: No    Sexual activity: Defer      Past Medical History:   Diagnosis Date    A-fib     Arthritis     Congestive heart failure (CHF)     Depression     Disease of thyroid gland     Elevated cholesterol     GERD (gastroesophageal reflux disease)     Headache     Hypertension     Kidney disease     Sleep apnea     Type 2 diabetes mellitus      Past Surgical History:   Procedure Laterality Date    CHOLECYSTECTOMY      COLONOSCOPY N/A 4/29/2022    Procedure: COLONOSCOPY;  Surgeon: Parmjit Traore MD;  Location: [facility];  Service: Gastroenterology;  Laterality: N/A;  Diverticulosis    HYSTERECTOMY  1996    bilateral oophorectomy for heavy bleeding. No HRT    ROTATOR CUFF REPAIR      ACUTE    SHOULDER SURGERY Bilateral     hAS HAD ROTATOR CUFF SURGERY ON BOTH SHOULDERS       Family History   Problem Relation Age of Onset    Diabetes Mother     Kidney disease Father     Arthritis Father     Kidney cancer Father     Prostate cancer Father     Cancer Other     Diabetes Other     Glaucoma Other     Rheum arthritis Other     Kidney disease Other     Breast cancer Neg Hx                Objective:  Physical Exam  General: No acute distress.  Eyes: conjunctiva clear; pupils equally round and reactive  ENT: external ears and nose atraumatic; oropharynx clear  CV: no peripheral edema  Resp: normal  "respiratory effort  Skin: no rashes or wounds; normal turgor  Psych: mood and affect appropriate; recent and remote memory intact    Vitals:    07/24/23 1402   Weight: 92.1 kg (203 lb)   Height: 160 cm (63\")         07/24/23  1402   Weight: 92.1 kg (203 lb)     Body mass index is 35.96 kg/m².    Exam:  Left knee examined  Knee range of motion 0 to 120 degrees  Stable varus and valgus stress at 0 degrees and 30 degrees  Positive tenderness medial compartment, patella  Moderate swelling negative effusion  Positive crepitus throughout arc of motion  Positive active patellar compression test    Imaging:  Left Knee X-Ray  Indication: Pain    AP, Lateral, and [place] views    Findings:  No fracture  No bony lesion  Normal soft tissues  Tricompartmental osteoarthritis with bone-on-bone articulation medial compartment, reactive osteophytes in all 3 compartments     prior studies were available for comparison.    Assessment:        1. Primary osteoarthritis of left knee           Plan:    Large Joint Arthrocentesis: L knee  Date/Time: 7/24/2023 2:19 PM  Consent given by: patient  Site marked: site marked  Timeout: Immediately prior to procedure a time out was called to verify the correct patient, procedure, equipment, support staff and site/side marked as required   Supporting Documentation  Indications: pain   Procedure Details  Location: knee - L knee  Preparation: Patient was prepped and draped in the usual sterile fashion  Needle size: 22 G  Approach: anterolateral  Medications administered: 8 mL lidocaine PF 1% 1 %; 80 mg triamcinolone acetonide 40 MG/ML  Patient tolerance: patient tolerated the procedure well with no immediate complications        Discussed treatment options with patient.  We did discuss proceeding with corticosteroid injection today's visit which she does wish to proceed with.  I do recommend follow-up 4 weeks we will proceed with viscosupplementation injections at that time.  I do prefer Monovisc if " available.  All questions answered today's visit.  Orders:  Orders Placed This Encounter   Procedures    XR Knee 3+ View With [place] Left     No orders of the defined types were placed in this encounter.          Dragon dictation utilized

## 2023-08-21 ENCOUNTER — CLINICAL SUPPORT (OUTPATIENT)
Dept: ORTHOPEDIC SURGERY | Facility: CLINIC | Age: 73
End: 2023-08-21
Payer: MEDICARE

## 2023-08-21 VITALS — WEIGHT: 203 LBS | HEIGHT: 63 IN | BODY MASS INDEX: 35.97 KG/M2

## 2023-08-21 DIAGNOSIS — M17.12 PRIMARY OSTEOARTHRITIS OF LEFT KNEE: Primary | ICD-10-CM

## 2023-08-21 PROCEDURE — 20610 DRAIN/INJ JOINT/BURSA W/O US: CPT | Performed by: NURSE PRACTITIONER

## 2023-08-21 NOTE — PROGRESS NOTES
Large Joint Arthrocentesis: L knee  Date/Time: 8/21/2023 3:22 PM  Consent given by: patient  Site marked: site marked  Timeout: Immediately prior to procedure a time out was called to verify the correct patient, procedure, equipment, support staff and site/side marked as required   Supporting Documentation  Indications: pain   Procedure Details  Location: knee - L knee  Preparation: Patient was prepped and draped in the usual sterile fashion  Needle size: 20 G  Approach: superior  Medications administered: 60 mg Sodium Hyaluronate 60 MG/3ML  Patient tolerance: patient tolerated the procedure well with no immediate complications      Patient presents to clinic today for left knee viscosupplement injections.  This is the single injection of the series.  I explained details of injections as well as risks, benefits and alternatives with the patient today, had all questions answered, wished to proceed with injections.  I will see patient back in 4 weeks for re-evaluation. Patient was instructed to watch for signs or symptoms of infection including redness, swelling, warmth to the touch, or significant increased pain and to contact our office immediately if any of these issues were noted.

## 2023-09-18 ENCOUNTER — OFFICE VISIT (OUTPATIENT)
Dept: ORTHOPEDIC SURGERY | Facility: CLINIC | Age: 73
End: 2023-09-18
Payer: MEDICARE

## 2023-09-18 VITALS — HEIGHT: 63 IN | BODY MASS INDEX: 35.97 KG/M2 | WEIGHT: 203 LBS

## 2023-09-18 DIAGNOSIS — M17.12 PRIMARY OSTEOARTHRITIS OF LEFT KNEE: Primary | ICD-10-CM

## 2023-09-18 DIAGNOSIS — M25.362 INSTABILITY OF LEFT KNEE JOINT: ICD-10-CM

## 2023-09-18 RX ORDER — ALLOPURINOL 100 MG/1
TABLET ORAL
COMMUNITY
Start: 2023-08-17

## 2023-09-18 RX ORDER — ACETAMINOPHEN 500 MG
2 TABLET ORAL EVERY 6 HOURS PRN
COMMUNITY

## 2023-09-18 RX ORDER — METHYLPREDNISOLONE 4 MG/1
TABLET ORAL
Qty: 21 TABLET | Refills: 0 | Status: SHIPPED | OUTPATIENT
Start: 2023-09-18

## 2023-09-18 RX ADMIN — LIDOCAINE HYDROCHLORIDE 4 ML: 10 INJECTION, SOLUTION EPIDURAL; INFILTRATION; INTRACAUDAL; PERINEURAL at 13:56

## 2023-09-18 NOTE — PROGRESS NOTES
Subjective:     Patient ID: Lisa Gavin is a 72 y.o. female.    Chief Complaint:  DJD left knee  Viscosupplementation injection 8/21/2023  Corticosteroid injection 7/24/2023  History of Present Illness  Lisa Gavin Returns to clinic for follow-up of her left knee.  Approximately 6 weeks ago receive viscosupplementation injections left knee and ever since has continued to experience increased pain and swelling along the medial aspect of the knee.  Increased pain noted with ambulating long distances standing for extended periods of time she has noted worsening of symptoms over the weekend with increased activity.  Most recently received corticosteroid injection in July 2023 which did provide her with fairly decent symptom relief.  Continues to experience maximal tenderness along the medial compartment.  Denies any other concerns present.    Social History     Occupational History    Occupation:    Tobacco Use    Smoking status: Never     Passive exposure: Never    Smokeless tobacco: Never   Vaping Use    Vaping Use: Never used   Substance and Sexual Activity    Alcohol use: No    Drug use: No    Sexual activity: Defer      Past Medical History:   Diagnosis Date    A-fib     Arthritis     Congestive heart failure (CHF)     Depression     Disease of thyroid gland     Elevated cholesterol     GERD (gastroesophageal reflux disease)     Headache     Hypertension     Kidney disease     Sleep apnea     Type 2 diabetes mellitus      Past Surgical History:   Procedure Laterality Date    CHOLECYSTECTOMY      COLONOSCOPY N/A 4/29/2022    Procedure: COLONOSCOPY;  Surgeon: Parmjit Traore MD;  Location: [facility];  Service: Gastroenterology;  Laterality: N/A;  Diverticulosis    HYSTERECTOMY  1996    bilateral oophorectomy for heavy bleeding. No HRT    ROTATOR CUFF REPAIR      ACUTE    SHOULDER SURGERY Bilateral     hAS HAD ROTATOR CUFF SURGERY ON BOTH SHOULDERS       Family History   Problem  "Relation Age of Onset    Diabetes Mother     Kidney disease Father     Arthritis Father     Kidney cancer Father     Prostate cancer Father     Cancer Other     Diabetes Other     Glaucoma Other     Rheum arthritis Other     Kidney disease Other     Breast cancer Neg Hx                Objective:  Physical Exam  General: No acute distress.  Eyes: conjunctiva clear; pupils equally round and reactive  ENT: external ears and nose atraumatic; oropharynx clear  CV: no peripheral edema  Resp: normal respiratory effort  Skin: no rashes or wounds; normal turgor  Psych: mood and affect appropriate; recent and remote memory intact    Vitals:    09/18/23 1324   Weight: 92.1 kg (203 lb)   Height: 160 cm (63\")         09/18/23  1324   Weight: 92.1 kg (203 lb)     Body mass index is 35.96 kg/m².      Left Knee Exam     Tenderness   The patient is experiencing tenderness in the medial joint line and patella.    Range of Motion   Extension:  0   Flexion:  120     Tests   Carla:  Medial - positive   Lachman:  Anterior - 1+      Patellar apprehension: positive    Other   Erythema: absent  Sensation: normal  Pulse: present  Swelling: severe    Comments:  No instability with valgus testing  Stable to varus stress at 0 degrees and 30 degrees  Positive active patellar compression test  Positive crepitus throughout arc of motion           Imaging:  Left Knee X-Ray  Indication: Pain    AP, Lateral, and [place] views    Findings:  No fracture  No bony lesion  Normal soft tissues  Advance tricompartmental osteoarthritis with with bone-on-bone articulation along the medial compartment, patellofemoral joint ;osteophytes in all 3 compartments    prior studies were available for comparison.    Assessment:        1. Primary osteoarthritis of left knee    2. Instability of left knee joint           Plan:  Large Joint Arthrocentesis: L knee  Date/Time: 9/18/2023 1:56 PM  Consent given by: patient  Site marked: site marked  Timeout: Immediately " prior to procedure a time out was called to verify the correct patient, procedure, equipment, support staff and site/side marked as required   Supporting Documentation  Indications: pain   Procedure Details  Location: knee - L knee  Preparation: Patient was prepped and draped in the usual sterile fashion  Needle size: 18 G  Approach: superior  Medications administered: 4 mL lidocaine PF 1% 1 %  Aspirate amount: 0 mL  Patient tolerance: patient tolerated the procedure well with no immediate complications      1.  Discussed plan of care with patient.  We did discuss proceeding with total knee arthroplasty versus conservative nonoperative treatment.  She at this time she wishes to hold off on surgical intervention does wish to proceed with submission for custom  brace.  Custom brace required due to disproportionate size of thigh and calf, BMI over 30.  We will also proceed with aspiration lidocaine injection only plan to see her back in clinic 1 month previously scheduled appointment.  Encouraged to call with any questions or concerns gladly see her sooner if needed.  All questions answered.  Orders:  Orders Placed This Encounter   Procedures    Large Joint Arthrocentesis: L knee     New Medications Ordered This Visit   Medications    methylPREDNISolone (MEDROL) 4 MG tablet     Sig: Three tablets daily with food x 7 days     Dispense:  21 tablet     Refill:  0       Body mass index is 35.96 kg/m².      I ordered and reviewed the FOSTER today.       Dragon dictation utilized  We encourage all our patients to maintain a healthy weight - a Body Mass Index of 20-25. This, along with regular exercise and a balanced diet can lower your risk of many illnesses such as diabetes, heart disease, and stroke.

## 2023-09-21 RX ORDER — LIDOCAINE HYDROCHLORIDE 10 MG/ML
4 INJECTION, SOLUTION EPIDURAL; INFILTRATION; INTRACAUDAL; PERINEURAL
Status: COMPLETED | OUTPATIENT
Start: 2023-09-18 | End: 2023-09-18

## 2023-10-16 ENCOUNTER — OFFICE VISIT (OUTPATIENT)
Dept: ORTHOPEDIC SURGERY | Facility: CLINIC | Age: 73
End: 2023-10-16
Payer: MEDICARE

## 2023-10-16 VITALS — BODY MASS INDEX: 35.97 KG/M2 | WEIGHT: 203 LBS | HEIGHT: 63 IN

## 2023-10-16 DIAGNOSIS — M17.12 PRIMARY OSTEOARTHRITIS OF LEFT KNEE: Primary | ICD-10-CM

## 2023-10-16 RX ORDER — METHYLPREDNISOLONE 4 MG/1
TABLET ORAL
Qty: 21 TABLET | Refills: 0 | Status: SHIPPED | OUTPATIENT
Start: 2023-10-16

## 2023-10-16 RX ADMIN — LIDOCAINE HYDROCHLORIDE 8 ML: 10 INJECTION, SOLUTION EPIDURAL; INFILTRATION; INTRACAUDAL; PERINEURAL at 14:19

## 2023-10-16 RX ADMIN — TRIAMCINOLONE ACETONIDE 80 MG: 40 INJECTION, SUSPENSION INTRA-ARTICULAR; INTRAMUSCULAR at 14:19

## 2023-10-16 NOTE — PROGRESS NOTES
Subjective:     Patient ID: Lisa Gavin is a 73 y.o. female.    Chief Complaint:  Follow-up DJD left knee  Viscosupplementation injection 8/21/2023  History of Present Illness  Lisa Gavin returns to clinic for follow-up of her right knee.  Viscosupplementation injection completed 8/21/2023 presented 19 2023 with significant swelling aspiration only was completed presents today for reevaluation of the knee.  Denies that the knee is locking, catching feels less as if the knee is going to give way is experiencing pain along the medial compartment as well as the anterior aspect of the knee.  Increased pain with extension and flexion but pressure and pain with weightbearing has improved she has been fitted for custom  brace which will arrive this week or within the next week.  Denies any other concerns present.     Social History     Occupational History    Occupation:    Tobacco Use    Smoking status: Never     Passive exposure: Never    Smokeless tobacco: Never   Vaping Use    Vaping Use: Never used   Substance and Sexual Activity    Alcohol use: No    Drug use: No    Sexual activity: Defer      Past Medical History:   Diagnosis Date    A-fib     Arthritis     Congestive heart failure (CHF)     Depression     Disease of thyroid gland     Elevated cholesterol     GERD (gastroesophageal reflux disease)     Headache     Hypertension     Kidney disease     Sleep apnea     Type 2 diabetes mellitus      Past Surgical History:   Procedure Laterality Date    CHOLECYSTECTOMY      COLONOSCOPY N/A 4/29/2022    Procedure: COLONOSCOPY;  Surgeon: Parmjit Traore MD;  Location: [facility];  Service: Gastroenterology;  Laterality: N/A;  Diverticulosis    HYSTERECTOMY  1996    bilateral oophorectomy for heavy bleeding. No HRT    ROTATOR CUFF REPAIR      ACUTE    SHOULDER SURGERY Bilateral     hAS HAD ROTATOR CUFF SURGERY ON BOTH SHOULDERS       Family History   Problem Relation Age of  "Onset    Diabetes Mother     Kidney disease Father     Arthritis Father     Kidney cancer Father     Prostate cancer Father     Cancer Other     Diabetes Other     Glaucoma Other     Rheum arthritis Other     Kidney disease Other     Breast cancer Neg Hx                Objective:  Physical Exam    General: No acute distress.  Eyes: conjunctiva clear; pupils equally round and reactive  ENT: external ears and nose atraumatic; oropharynx clear  CV: no peripheral edema  Resp: normal respiratory effort  Skin: no rashes or wounds; normal turgor  Psych: mood and affect appropriate; recent and remote memory intact    Vitals:    10/16/23 1333   Weight: 92.1 kg (203 lb)   Height: 160 cm (63\")         10/16/23  1333   Weight: 92.1 kg (203 lb)     Body mass index is 35.96 kg/m².      Ortho Exam       Left knee exam  Maximal tenderness present medial compartment, patella  Knee range of motion 0 to 120 degrees  Positive medial Carla's exam  Negative lateral Carla's exam  1+ anterior Lachman  Positive patellar apprehension  Positive crepitus or arc of motion  Positive active patellar compression test  Pseudolaxity with valgus stress testing  Positive sensation light touch in distributions left lower extremity    Assessment:        1. Primary osteoarthritis of left knee           Plan:    - Large Joint Arthrocentesis: L knee on 10/16/2023 2:19 PM  Indications: pain  Details: 22 G needle, anterolateral approach  Medications: 80 mg triamcinolone acetonide 40 MG/ML; 8 mL lidocaine PF 1% 1 %  Outcome: tolerated well, no immediate complications  Procedure, treatment alternatives, risks and benefits explained, specific risks discussed. Consent was given by the patient. Immediately prior to procedure a time out was called to verify the correct patient, procedure, equipment, support staff and site/side marked as required. Patient was prepped and draped in the usual sterile fashion.         Plan of care with patient.  Does wish to " proceed corticosteroid injection left knee anterior lateral approach I do recommend application of ice injection site this evening she does not wish to proceed with intervention we did discuss repeat injection 3-month interval pending how she is feeling.  Encouraged to continue weightbearing as tolerated.  Start oral Medrol pack discontinue ibuprofen.  All questions answered.  Orders:  Orders Placed This Encounter   Procedures    - Large Joint Arthrocentesis: L knee     New Medications Ordered This Visit   Medications    methylPREDNISolone (MEDROL) 4 MG dose pack     Sig: Use as directed by package instructions     Dispense:  21 tablet     Refill:  0           Dragon dictation utilized

## 2023-10-17 RX ORDER — LIDOCAINE HYDROCHLORIDE 10 MG/ML
8 INJECTION, SOLUTION EPIDURAL; INFILTRATION; INTRACAUDAL; PERINEURAL
Status: COMPLETED | OUTPATIENT
Start: 2023-10-16 | End: 2023-10-16

## 2023-10-17 RX ORDER — TRIAMCINOLONE ACETONIDE 40 MG/ML
80 INJECTION, SUSPENSION INTRA-ARTICULAR; INTRAMUSCULAR
Status: COMPLETED | OUTPATIENT
Start: 2023-10-16 | End: 2023-10-16

## 2023-11-13 ENCOUNTER — TELEPHONE (OUTPATIENT)
Dept: ORTHOPEDIC SURGERY | Facility: CLINIC | Age: 73
End: 2023-11-13
Payer: MEDICARE

## 2023-11-13 RX ORDER — METHYLPREDNISOLONE 4 MG/1
TABLET ORAL
Qty: 21 TABLET | Refills: 0 | Status: SHIPPED | OUTPATIENT
Start: 2023-11-13

## 2023-11-13 NOTE — TELEPHONE ENCOUNTER
Patient calling with bilateral leg and right hand pain- patient states you've treated her for gout in the past and this feels the same.    DX: Primary osteoarthritis of left knee [M17.12]     Last seen 10.16.2023-steroid injection Left knee-medrol dose pack sent in.    Please advise.

## 2023-12-06 ENCOUNTER — TRANSCRIBE ORDERS (OUTPATIENT)
Dept: ADMINISTRATIVE | Facility: HOSPITAL | Age: 73
End: 2023-12-06
Payer: MEDICARE

## 2023-12-06 DIAGNOSIS — I12.9 HYPERTENSIVE NEPHROPATHY: ICD-10-CM

## 2023-12-06 DIAGNOSIS — N18.31 CHRONIC KIDNEY DISEASE (CKD) STAGE G3A/A1, MODERATELY DECREASED GLOMERULAR FILTRATION RATE (GFR) BETWEEN 45-59 ML/MIN/1.73 SQUARE METER AND ALBUMINURIA CREATININE RATIO LESS THAN 30 MG/G (CMS/H*: Primary | ICD-10-CM

## 2023-12-06 DIAGNOSIS — E11.22 TYPE 2 DIABETES MELLITUS WITH ESRD (END-STAGE RENAL DISEASE): ICD-10-CM

## 2023-12-06 DIAGNOSIS — N18.6 TYPE 2 DIABETES MELLITUS WITH ESRD (END-STAGE RENAL DISEASE): ICD-10-CM

## 2023-12-08 ENCOUNTER — LAB (OUTPATIENT)
Dept: LAB | Facility: HOSPITAL | Age: 73
End: 2023-12-08
Payer: MEDICARE

## 2023-12-08 ENCOUNTER — TRANSCRIBE ORDERS (OUTPATIENT)
Dept: ADMINISTRATIVE | Facility: HOSPITAL | Age: 73
End: 2023-12-08
Payer: MEDICARE

## 2023-12-08 DIAGNOSIS — I12.9 HYPERTENSIVE NEPHROPATHY: ICD-10-CM

## 2023-12-08 DIAGNOSIS — N18.6 TYPE 2 DIABETES MELLITUS WITH ESRD (END-STAGE RENAL DISEASE): ICD-10-CM

## 2023-12-08 DIAGNOSIS — E11.22 TYPE 2 DIABETES MELLITUS WITH ESRD (END-STAGE RENAL DISEASE): ICD-10-CM

## 2023-12-08 DIAGNOSIS — N18.31 CHRONIC KIDNEY DISEASE (CKD) STAGE G3A/A1, MODERATELY DECREASED GLOMERULAR FILTRATION RATE (GFR) BETWEEN 45-59 ML/MIN/1.73 SQUARE METER AND ALBUMINURIA CREATININE RATIO LESS THAN 30 MG/G (CMS/H*: Primary | ICD-10-CM

## 2023-12-08 DIAGNOSIS — N18.31 CHRONIC KIDNEY DISEASE (CKD) STAGE G3A/A1, MODERATELY DECREASED GLOMERULAR FILTRATION RATE (GFR) BETWEEN 45-59 ML/MIN/1.73 SQUARE METER AND ALBUMINURIA CREATININE RATIO LESS THAN 30 MG/G (CMS/H*: ICD-10-CM

## 2023-12-08 LAB
ALBUMIN SERPL-MCNC: 4.2 G/DL (ref 3.5–5.2)
ALBUMIN UR-MCNC: <1.2 MG/DL
ANION GAP SERPL CALCULATED.3IONS-SCNC: 13.4 MMOL/L (ref 5–15)
BACTERIA UR QL AUTO: ABNORMAL /HPF
BILIRUB UR QL STRIP: NEGATIVE
BUN SERPL-MCNC: 35 MG/DL (ref 8–23)
BUN/CREAT SERPL: 20.2 (ref 7–25)
CALCIUM SPEC-SCNC: 9.3 MG/DL (ref 8.6–10.5)
CHLORIDE SERPL-SCNC: 107 MMOL/L (ref 98–107)
CLARITY UR: CLEAR
CO2 SERPL-SCNC: 21.6 MMOL/L (ref 22–29)
COLOR UR: YELLOW
CREAT SERPL-MCNC: 1.73 MG/DL (ref 0.57–1)
CREAT UR-MCNC: 96.2 MG/DL
CREAT UR-MCNC: 96.2 MG/DL
EGFRCR SERPLBLD CKD-EPI 2021: 31.1 ML/MIN/1.73
GLUCOSE SERPL-MCNC: 122 MG/DL (ref 65–99)
GLUCOSE UR STRIP-MCNC: NEGATIVE MG/DL
HGB UR QL STRIP.AUTO: NEGATIVE
HYALINE CASTS UR QL AUTO: ABNORMAL /LPF
KETONES UR QL STRIP: NEGATIVE
LEUKOCYTE ESTERASE UR QL STRIP.AUTO: ABNORMAL
MICROALBUMIN/CREAT UR: NORMAL MG/G{CREAT}
NITRITE UR QL STRIP: NEGATIVE
PH UR STRIP.AUTO: 5.5 [PH] (ref 5–8)
PHOSPHATE SERPL-MCNC: 3.3 MG/DL (ref 2.5–4.5)
POTASSIUM SERPL-SCNC: 4.8 MMOL/L (ref 3.5–5.2)
PROT ?TM UR-MCNC: 12.5 MG/DL
PROT UR QL STRIP: NEGATIVE
PROT/CREAT UR: 129.9 MG/G CREA (ref 0–200)
RBC # UR STRIP: ABNORMAL /HPF
REF LAB TEST METHOD: ABNORMAL
SODIUM SERPL-SCNC: 142 MMOL/L (ref 136–145)
SP GR UR STRIP: 1.02 (ref 1–1.03)
SQUAMOUS #/AREA URNS HPF: ABNORMAL /HPF
UROBILINOGEN UR QL STRIP: ABNORMAL
WBC # UR STRIP: ABNORMAL /HPF

## 2023-12-08 PROCEDURE — 80069 RENAL FUNCTION PANEL: CPT

## 2023-12-08 PROCEDURE — 81001 URINALYSIS AUTO W/SCOPE: CPT

## 2023-12-08 PROCEDURE — 36415 COLL VENOUS BLD VENIPUNCTURE: CPT

## 2023-12-08 PROCEDURE — 82043 UR ALBUMIN QUANTITATIVE: CPT

## 2023-12-08 PROCEDURE — 82570 ASSAY OF URINE CREATININE: CPT

## 2023-12-08 PROCEDURE — 84156 ASSAY OF PROTEIN URINE: CPT

## 2023-12-11 ENCOUNTER — HOSPITAL ENCOUNTER (EMERGENCY)
Facility: HOSPITAL | Age: 73
Discharge: HOME | End: 2023-12-11
Attending: EMERGENCY MEDICINE | Admitting: EMERGENCY MEDICINE
Payer: MEDICARE

## 2023-12-11 ENCOUNTER — APPOINTMENT (OUTPATIENT)
Dept: GENERAL RADIOLOGY | Facility: HOSPITAL | Age: 73
End: 2023-12-11
Payer: MEDICARE

## 2023-12-11 ENCOUNTER — APPOINTMENT (OUTPATIENT)
Dept: CT IMAGING | Facility: HOSPITAL | Age: 73
End: 2023-12-11
Payer: MEDICARE

## 2023-12-11 VITALS
TEMPERATURE: 97.9 F | HEIGHT: 63 IN | SYSTOLIC BLOOD PRESSURE: 135 MMHG | BODY MASS INDEX: 34.73 KG/M2 | OXYGEN SATURATION: 98 % | WEIGHT: 196 LBS | DIASTOLIC BLOOD PRESSURE: 78 MMHG | RESPIRATION RATE: 18 BRPM | HEART RATE: 63 BPM

## 2023-12-11 DIAGNOSIS — S40.011A CONTUSION OF RIGHT SHOULDER, INITIAL ENCOUNTER: ICD-10-CM

## 2023-12-11 DIAGNOSIS — S80.01XA CONTUSION OF RIGHT KNEE, INITIAL ENCOUNTER: Primary | ICD-10-CM

## 2023-12-11 PROCEDURE — 73030 X-RAY EXAM OF SHOULDER: CPT

## 2023-12-11 PROCEDURE — 73562 X-RAY EXAM OF KNEE 3: CPT

## 2023-12-11 PROCEDURE — 70450 CT HEAD/BRAIN W/O DYE: CPT

## 2023-12-11 PROCEDURE — 99284 EMERGENCY DEPT VISIT MOD MDM: CPT

## 2023-12-11 RX ORDER — METHOCARBAMOL 750 MG/1
750 TABLET, FILM COATED ORAL 3 TIMES DAILY PRN
Qty: 30 TABLET | Refills: 0 | Status: SHIPPED | OUTPATIENT
Start: 2023-12-11

## 2023-12-11 NOTE — ED PROVIDER NOTES
EMERGENCY DEPARTMENT ENCOUNTER      Room Number: 14/14    History is provided by the patient, no translation services needed    HPI:    Chief complaint: Injuries from a fall    Location: Right shoulder, right knee    Quality/Severity: The patient admits to a moderate aching pain at this time.    Timing/Duration: Since last night    Modifying Factors: Activity makes the pain worse    Associated Symptoms: None    Narrative: Pt is a 73 y.o. female who presents complaining of injuries from a fall that occurred at approximately 10 PM last night.  The patient advises that she was making her bed and putting new sheets on the bed.  She states that when she was returning to the bed from the dresser she accidentally tripped over the dog bed causing her to fall and land on her right side.  She is now complaining of pain in her right shoulder and her right knee.  She denies hitting her head during the fall.  She denies any loss of consciousness.  She states that she is on Xarelto.  She denies any neck or back pain.  She has not had any headaches, dizziness, vision changes, weakness, nausea, or vomiting.  She denies any other complaints.      PMD: Edith Chávez PA    REVIEW OF SYSTEMS  Review of Systems   Constitutional:  Negative for fever.   Eyes:  Negative for photophobia and visual disturbance.   Respiratory:  Negative for shortness of breath.    Cardiovascular:  Negative for chest pain.   Gastrointestinal:  Negative for abdominal pain, nausea and vomiting.   Musculoskeletal:  Negative for back pain and neck pain.   Skin:  Negative for color change, pallor, rash and wound.   Neurological:  Negative for dizziness, syncope, weakness, numbness and headaches.   Psychiatric/Behavioral:  Negative for confusion. The patient is not nervous/anxious.          PAST MEDICAL HISTORY  Active Ambulatory Problems     Diagnosis Date Noted    Hyperthyroidism 05/13/2016    Thyroid nodule 05/13/2016    Fatigue 05/13/2016    Abnormal  weight gain 05/13/2016    Uncontrolled type 2 diabetes mellitus 05/13/2016    Inadequate pain control 10/08/2019    Acid reflux 05/21/2013    Paroxysmal atrial fibrillation 03/31/2015    Fall at home 10/08/2019    Posterior pain of right hip 10/08/2019    Hamstring strain, right, subsequent encounter 10/15/2019    Primary osteoarthritis of left knee 12/06/2019    Anxiety disorder 02/25/2014    Arthritis 09/26/2019    Degeneration of intervertebral disc 05/05/2014    Depressive disorder 05/05/2014    Diabetes mellitus type 2 without retinopathy 12/04/2020    Diarrhea 03/27/2018    Dysphagia 03/27/2018    Edema 05/05/2014    Elevated troponin level 04/01/2015    Fecal urgency 03/27/2018    Foot pain 04/06/2015    Hematoma of lower limb 01/10/2017    High risk medication use 05/08/2015    Hyperlipidemia 05/05/2014    Instability of left knee joint 08/29/2017    Involuntary movements 01/25/2016    Irritable bowel syndrome with diarrhea 03/27/2018    Low back pain 05/20/2013    Migraine 05/05/2014    KRISTIN on CPAP 05/10/2017    Skin tag 11/18/2015    Spasm 12/11/2014    Sprain of knee 09/26/2019    Type 2 diabetes mellitus without complication, with long-term current use of insulin 03/06/2014    Well adult health check 12/02/2013    Gastroesophageal reflux disease 03/31/2014    Gastroparesis 11/29/2018    Hamstring injury 10/17/2019    Biceps tendinitis of left upper extremity 03/24/2021    Shoulder weakness 03/24/2021    Tendinitis of left rotator cuff 03/24/2021    Nontraumatic incomplete tear of left rotator cuff 03/24/2021    Class 2 obesity 04/09/2021    Osteopenia of neck of left femur 01/20/2022    Anemia in stage 3 chronic kidney disease 07/20/2022    Iron deficiency anemia 10/21/2022    Malabsorption of iron 10/21/2022    Pulmonary hypertension 11/19/2022     Resolved Ambulatory Problems     Diagnosis Date Noted    Snoring 04/09/2021    Hypersomnia 04/09/2021     Past Medical History:   Diagnosis Date    A-fib      Congestive heart failure (CHF)     Depression     Disease of thyroid gland     Elevated cholesterol     GERD (gastroesophageal reflux disease)     Headache     Hypertension     Kidney disease     Sleep apnea     Type 2 diabetes mellitus        PAST SURGICAL HISTORY  Past Surgical History:   Procedure Laterality Date    CHOLECYSTECTOMY      COLONOSCOPY N/A 4/29/2022    Procedure: COLONOSCOPY;  Surgeon: Parmjit Traore MD;  Location: [facility];  Service: Gastroenterology;  Laterality: N/A;  Diverticulosis    HYSTERECTOMY  1996    bilateral oophorectomy for heavy bleeding. No HRT    ROTATOR CUFF REPAIR      ACUTE    SHOULDER SURGERY Bilateral     hAS HAD ROTATOR CUFF SURGERY ON BOTH SHOULDERS       FAMILY HISTORY  Family History   Problem Relation Age of Onset    Diabetes Mother     Kidney disease Father     Arthritis Father     Kidney cancer Father     Prostate cancer Father     Cancer Other     Diabetes Other     Glaucoma Other     Rheum arthritis Other     Kidney disease Other     Breast cancer Neg Hx        SOCIAL HISTORY  Social History     Socioeconomic History    Marital status:    Tobacco Use    Smoking status: Never     Passive exposure: Never    Smokeless tobacco: Never   Vaping Use    Vaping Use: Never used   Substance and Sexual Activity    Alcohol use: No    Drug use: No    Sexual activity: Defer       ALLERGIES  Nsaids    No current facility-administered medications for this encounter.    Current Outpatient Medications:     acetaminophen (TYLENOL) 500 MG tablet, Take 2 tablets by mouth Every 6 (Six) Hours As Needed., Disp: , Rfl:     allopurinol (ZYLOPRIM) 100 MG tablet, TAKE 1 TABLET BY MOUTH 1 TIME EACH DAY., Disp: , Rfl:     atorvastatin (LIPITOR) 20 MG tablet, 1 tablet Every Night., Disp: , Rfl:     Calcium Carbonate+Vitamin D (RA Calcium Plus Vitamin D) 600-200 MG-UNIT tablet, Take 1 tablet by mouth 2 (Two) Times a Day., Disp: , Rfl:     Cholecalciferol (Vitamin D) 50 MCG (2000 UT)  tablet, TAKE 1 TABLET BY MOUTH EVERY DAY, Disp: 90 tablet, Rfl: 3    Cholecalciferol 50 MCG (2000 UT) tablet, Take 1 tablet by mouth Daily., Disp: , Rfl:     citalopram (CeleXA) 20 MG tablet, Take 2 tablets by mouth Every Night., Disp: , Rfl:     flecainide (TAMBOCOR) 100 MG tablet, Take 1 tablet by mouth 2 (Two) Times a Day., Disp: , Rfl:     furosemide (LASIX) 40 MG tablet, Take 1 tablet by mouth 2 (Two) Times a Day., Disp: , Rfl:     glyBURIDE-metFORMIN (GLUCOVANCE) 5-500 MG per tablet, Take 2 tablets by mouth 2 (Two) Times a Day., Disp: , Rfl:     losartan (COZAAR) 50 MG tablet, Take 1 tablet by mouth Daily., Disp: , Rfl:     methIMAzole (TAPAZOLE) 5 MG tablet, TAKE ONE TABLET BY MOUTH DAILY, Disp: 30 tablet, Rfl: 0    methocarbamol (ROBAXIN) 750 MG tablet, Take 1 tablet by mouth 3 (Three) Times a Day As Needed for Muscle Spasms., Disp: 30 tablet, Rfl: 0    methylPREDNISolone (MEDROL) 4 MG dose pack, Use as directed by package instructions, Disp: 21 tablet, Rfl: 0    metoprolol tartrate (LOPRESSOR) 50 MG tablet, 1 tablet 2 (Two) Times a Day., Disp: , Rfl:     omeprazole (priLOSEC) 40 MG capsule, Take 1 capsule by mouth Daily., Disp: , Rfl:     rivaroxaban (XARELTO) tablet, Take 1 tablet by mouth Daily., Disp: , Rfl:     vitamin B-12 (CYANOCOBALAMIN) 500 MCG tablet, Take 1 tablet by mouth Daily., Disp: 30 tablet, Rfl: 3    vitamin E 400 UNIT capsule, Take 1 capsule by mouth Daily., Disp: , Rfl:     PHYSICAL EXAM  ED Triage Vitals [12/11/23 1341]   Temp Heart Rate Resp BP SpO2   97.9 °F (36.6 °C) 63 18 135/78 98 %      Temp src Heart Rate Source Patient Position BP Location FiO2 (%)   -- Monitor Sitting Left arm --       Physical Exam  Vitals and nursing note reviewed.   Constitutional:       General: She is not in acute distress.     Appearance: Normal appearance. She is not ill-appearing, toxic-appearing or diaphoretic.   HENT:      Head: Normocephalic and atraumatic.      Nose: Nose normal. No congestion or  rhinorrhea.      Mouth/Throat:      Mouth: Mucous membranes are moist.      Pharynx: Oropharynx is clear.   Eyes:      General: No scleral icterus.        Right eye: No discharge.         Left eye: No discharge.      Extraocular Movements: Extraocular movements intact.      Conjunctiva/sclera: Conjunctivae normal.      Pupils: Pupils are equal, round, and reactive to light.   Cardiovascular:      Rate and Rhythm: Normal rate and regular rhythm.      Heart sounds: Normal heart sounds.      No friction rub.   Pulmonary:      Effort: Pulmonary effort is normal. No respiratory distress.      Breath sounds: Normal breath sounds. No stridor. No wheezing, rhonchi or rales.   Chest:      Chest wall: No tenderness.   Abdominal:      General: Bowel sounds are normal. There is no distension.      Palpations: Abdomen is soft. There is no mass.      Tenderness: There is no abdominal tenderness. There is no guarding or rebound.   Musculoskeletal:         General: Tenderness (Right shoulder, right knee) present. No swelling, deformity or signs of injury. Normal range of motion.      Cervical back: Normal range of motion and neck supple. No rigidity or tenderness.      Right lower leg: No edema.      Left lower leg: No edema.   Skin:     General: Skin is warm and dry.      Coloration: Skin is not jaundiced or pale.      Findings: No bruising, erythema, lesion or rash.   Neurological:      Mental Status: She is alert and oriented to person, place, and time.      Motor: No weakness.      Coordination: Coordination normal.   Psychiatric:         Mood and Affect: Mood and affect normal.           LAB RESULTS  Lab Results (last 24 hours)       ** No results found for the last 24 hours. **              RADIOLOGY  XR Knee 3 View Right    Result Date: 12/11/2023  RIGHT KNEE, 12/11/2023  HISTORY: 73-year-old female in the ED with right knee and shoulder pain after a fall.  TECHNIQUE: Three view right knee series.  FINDINGS: No fracture,  dislocation or other acute osseous abnormality is demonstrated. No visible knee joint effusion.  Moderate tricompartment degenerative arthropathy with medial knee joint space narrowing.      1. No acute osseous abnormality. 2. Degenerative knee arthropathy.  This report was finalized on 12/11/2023 3:18 PM by Dr. Arnie Gomez MD.      XR Shoulder 2+ View Right    Result Date: 12/11/2023  RIGHT SHOULDER, 12/11/2023  HISTORY: 73-year-old female in the ED with right knee and shoulder pain after a fall.  TECHNIQUE: Three view right shoulder series.  FINDINGS: No fracture, dislocation or other acute osseous abnormality is demonstrated.  Previous right shoulder rotator cuff surgery with resection of the distal clavicle. Mild degenerative change at the glenohumeral joint. Soft tissue calcifications in the rotator cuff tendons likely representing chronic calcific tendinopathy.      1. No acute osseous abnormality. 2. Degenerative and postoperative changes as noted above.  This report was finalized on 12/11/2023 3:08 PM by Dr. Arnie Gomez MD.      CT Head Without Contrast    Result Date: 12/11/2023  CT HEAD, NONCONTRAST, 12/11/2023  HISTORY: In the ED with injuries after a fall last evening. She states she did not strike her head. The patient is on Xarelto.  TECHNIQUE:  CT imaging of the head without IV contrast. Radiation dose reduction techniques included automated exposure control. High radiation dose exams here in the last 12 months: 1.  COMPARISON: *  CT head, 10/7/2019.  FINDINGS:  No acute intracranial abnormality is demonstrated. No visible skull fracture or scalp contusion.  There is no evidence of acute intracranial hemorrhage.  Minimal generalized age-appropriate cerebral volume loss. No visible mass, mass effect, cerebral edema, extra-axial fluid collection or hydrocephalus. Visualized paranasal sinuses and mastoid air spaces are clear.      Negative noncontrast head CT examination. No change since  10/7/2019. No evidence of acute intracranial hemorrhage.  This report was finalized on 12/11/2023 3:04 PM by Dr. Arnie Gomez MD.       I ordered the above radiologic testing and reviewed the results    PROCEDURES  Procedures      PROGRESS AND CONSULTS  ED Course as of 12/11/23 1536   Mon Dec 11, 2023   1407 The patient appears well.  She is in no acute distress.  Vital signs are stable and within normal limits.  She is neurovascularly intact.  Imaging ordered to evaluate further. [AH]   1534 Results discussed in depth with the patient.  She expressed understanding.  Follow-up instructions given.  Return to the ED instructions given. [AH]      ED Course User Index  [] Rossy Tobias PA-C           MEDICAL DECISION MAKING    MDM       My diagnosis for lower extremity pain and injury includes but is not limited to hip fracture, femur fracture, hip dislocation, hip contusion, hip sprain, hip strain, pelvic fracture, ischio-tibial band pain, ischio-tibial band bursitis, knee sprain, patella dislocation, knee dislocation, internal derangement of knee, fractures of the femur, tibia, fibula, ankle, foot and digits, ankle sprain, ankle dislocation, Lisfranc fracture, fracture dislocations of the digits, pulmonary embolism, claudication, peripheral vascular disease, gout, osteoarthritis, rheumatoid arthritis, bursitis, septic joint, poly-rheumatica, polyarthralgia and other inflammatory or infectious disease processes.   DIAGNOSIS  Final diagnoses:   Contusion of right knee, initial encounter   Contusion of right shoulder, initial encounter       Latest Documented Vital Signs:  As of 15:36 EST  BP- 135/78 HR- 63 Temp- 97.9 °F (36.6 °C) O2 sat- 98%    DISPOSITION  Pt discharged    Discussed pertinent findings with the patient/family.  Patient/Family voiced understanding of need to follow-up for recheck and further testing as needed.  Return to the Emergency Department warnings were given.         Medication List         New Prescriptions      methocarbamol 750 MG tablet  Commonly known as: ROBAXIN  Take 1 tablet by mouth 3 (Three) Times a Day As Needed for Muscle Spasms.               Where to Get Your Medications        These medications were sent to [facility]/pharmacy #[id number] - [place], [place] - [address] - [phone]  - [phone] FX  [address] [address]      Phone: [phone]   methocarbamol 750 MG tablet              Follow-up Information       Edith Chávez PA. Call today.    Specialty: Physician Assistant  Contact information:  [address] [facility] [address] [phone]               Go to  [facility] EMERGENCY DEPARTMENT.    Specialty: Emergency Medicine  Why: If symptoms worsen  Contact information:  [address]  [place] [address] [phone]                             Dictated utilizing [employer] dictation     Rossy Tobias PA-C  12/11/23 0586

## 2024-03-25 ENCOUNTER — OFFICE VISIT (OUTPATIENT)
Dept: ORTHOPEDIC SURGERY | Facility: CLINIC | Age: 74
End: 2024-03-25
Payer: MEDICARE

## 2024-03-25 VITALS — WEIGHT: 196 LBS | HEIGHT: 63 IN | BODY MASS INDEX: 34.73 KG/M2

## 2024-03-25 DIAGNOSIS — M75.51 SUBACROMIAL BURSITIS OF RIGHT SHOULDER JOINT: ICD-10-CM

## 2024-03-25 DIAGNOSIS — R52 PAIN: ICD-10-CM

## 2024-03-25 DIAGNOSIS — M25.362 INSTABILITY OF LEFT KNEE JOINT: ICD-10-CM

## 2024-03-25 DIAGNOSIS — M17.12 PRIMARY OSTEOARTHRITIS OF LEFT KNEE: Primary | ICD-10-CM

## 2024-03-25 PROCEDURE — 99213 OFFICE O/P EST LOW 20 MIN: CPT | Performed by: NURSE PRACTITIONER

## 2024-03-25 PROCEDURE — 73030 X-RAY EXAM OF SHOULDER: CPT | Performed by: NURSE PRACTITIONER

## 2024-03-25 PROCEDURE — 20610 DRAIN/INJ JOINT/BURSA W/O US: CPT | Performed by: NURSE PRACTITIONER

## 2024-03-25 RX ORDER — LANOLIN ALCOHOL/MO/W.PET/CERES
1000 CREAM (GRAM) TOPICAL DAILY
COMMUNITY

## 2024-03-25 RX ORDER — LIDOCAINE HYDROCHLORIDE 10 MG/ML
8 INJECTION, SOLUTION EPIDURAL; INFILTRATION; INTRACAUDAL; PERINEURAL
Status: COMPLETED | OUTPATIENT
Start: 2024-03-25 | End: 2024-03-25

## 2024-03-25 RX ORDER — TRIAMCINOLONE ACETONIDE 40 MG/ML
80 INJECTION, SUSPENSION INTRA-ARTICULAR; INTRAMUSCULAR
Status: COMPLETED | OUTPATIENT
Start: 2024-03-25 | End: 2024-03-25

## 2024-03-25 RX ORDER — DICYCLOMINE HCL 20 MG
20 TABLET ORAL 2 TIMES DAILY
COMMUNITY

## 2024-03-25 RX ADMIN — LIDOCAINE HYDROCHLORIDE 8 ML: 10 INJECTION, SOLUTION EPIDURAL; INFILTRATION; INTRACAUDAL; PERINEURAL at 08:10

## 2024-03-25 RX ADMIN — TRIAMCINOLONE ACETONIDE 80 MG: 40 INJECTION, SUSPENSION INTRA-ARTICULAR; INTRAMUSCULAR at 08:10

## 2024-03-25 NOTE — PROGRESS NOTES
Subjective:     Patient ID: Lisa Gavin is a 73 y.o. female.    Chief Complaint:  DJD left knee  Right shoulder injury- new issue to examiner   History of Present Illness  Lisa Gavin    The patient presents to clinic today for evaluation of her left knee.    She continues to experience maximum tenderness along the medial compartment, anterior aspect of the knee. She recently had a dog running to the front of her knee, which began experiencing increased pain as well as swelling along the medial aspect of the knee. Otherwise, she was doing quite well up until recently. Symptoms have been present for the last month or so.    She is also reporting pain at her right shoulder, which is a new issue. She fell a couple of times back in 12/2023. She is unable to reach above her head, has difficulty in pain reaching above her shoulder height. She does report prior rotator cuff repair approximately 20 years ago. She denies any recent radiograph, MRI, or CT. She denies any other concerns present.       Social History     Occupational History    Occupation:    Tobacco Use    Smoking status: Never     Passive exposure: Never    Smokeless tobacco: Never   Vaping Use    Vaping status: Never Used   Substance and Sexual Activity    Alcohol use: No    Drug use: No    Sexual activity: Defer      Past Medical History:   Diagnosis Date    A-fib     Arthritis     Congestive heart failure (CHF)     Depression     Disease of thyroid gland     Elevated cholesterol     GERD (gastroesophageal reflux disease)     Headache     Hypertension     Kidney disease     Sleep apnea     Type 2 diabetes mellitus      Past Surgical History:   Procedure Laterality Date    CHOLECYSTECTOMY      COLONOSCOPY N/A 4/29/2022    Procedure: COLONOSCOPY;  Surgeon: Parmjit Traore MD;  Location: [facility];  Service: Gastroenterology;  Laterality: N/A;  Diverticulosis    HYSTERECTOMY  1996    bilateral oophorectomy for heavy  "bleeding. No HRT    ROTATOR CUFF REPAIR      ACUTE    SHOULDER SURGERY Bilateral     hAS HAD ROTATOR CUFF SURGERY ON BOTH SHOULDERS       Family History   Problem Relation Age of Onset    Diabetes Mother     Kidney disease Father     Arthritis Father     Kidney cancer Father     Prostate cancer Father     Cancer Other     Diabetes Other     Glaucoma Other     Rheum arthritis Other     Kidney disease Other     Breast cancer Neg Hx                Objective:  Physical Exam  General: No acute distress.  Eyes: conjunctiva clear; pupils equally round and reactive  ENT: external ears and nose atraumatic; oropharynx clear  CV: no peripheral edema  Resp: normal respiratory effort  Skin: no rashes or wounds; normal turgor  Psych: mood and affect appropriate; recent and remote memory intact    Vitals:    03/25/24 0806   Weight: 88.9 kg (196 lb)   Height: 160 cm (63\")         03/25/24  0806   Weight: 88.9 kg (196 lb)     Body mass index is 34.72 kg/m².      Left Knee Exam     Tenderness   The patient is experiencing tenderness in the medial joint line and patella.    Range of Motion   Extension:  0   Flexion:  120     Tests   Carla:  Medial - positive Lateral - negative  Varus: negative Valgus: negative  Lachman:  Anterior - 1+      Patellar apprehension: positive    Other   Erythema: absent  Sensation: normal  Pulse: present  Swelling: severe  Effusion: no effusion present    Comments:  Positive crepitus or arc of motion      Right Shoulder Exam     Range of Motion   External rotation:  50   Forward flexion:  90 (90 degrees active; 120 degrees passive)     Muscle Strength   Internal rotation: 4/5   External rotation: 4/5   Supraspinatus: 4/5   Subscapularis: 4/5   Biceps: 4/5     Tests   Llanos test: positive  Impingement: positive  Drop arm: negative    Other   Erythema: absent  Sensation: normal  Pulse: present    Comments:  Mildly positive empty can  negative Zarephath's  Mildly positive Speed's  negative bear hug " exam                 Imaging:  Right Shoulder X-Ray  Indication: Pain  AP Internal and External Rotation views    Findings:  No fracture  No bony lesion  Normal soft tissues  Mild glenohumeral arthritis    No prior studies were available for comparison.    Assessment:        1. Pain    2. Primary osteoarthritis of left knee    3. Instability of left knee joint    4. Subacromial bursitis of right shoulder joint           Plan:  Left knee pain.  - We discussed plan of care with patient and family. We will proceed with corticosteroid injection today's visit, left knee. She would like to discuss surgical intervention with Dr. Payne. Plan to have her see him in 2 months for reevaluation. No radiograph imaging needed at that time.  2. Right shoulder pain.  - We did discuss options of her shoulder including injection versus topical anti-inflammatory versus MRI. She wishes to hold off on any further treatment for now with the shoulder. I do recommend some gentle exercises and motion for the shoulder that we did discuss today's visit.  If symptoms continue to cause her discomfort, she will call back to clinic. She was encouraged to call with any questions or concerns.   4. All questions answered.   Large Joint Arthrocentesis: L knee  Date/Time: 3/25/2024 8:10 AM  Consent given by: patient  Site marked: site marked  Timeout: Immediately prior to procedure a time out was called to verify the correct patient, procedure, equipment, support staff and site/side marked as required   Supporting Documentation  Indications: pain   Procedure Details  Location: knee - L knee  Preparation: Patient was prepped and draped in the usual sterile fashion  Needle size: 22 G  Approach: anterolateral  Medications administered: 8 mL lidocaine PF 1% 1 %; 80 mg triamcinolone acetonide 40 MG/ML  Patient tolerance: patient tolerated the procedure well with no immediate complications            Orders:  Orders Placed This Encounter   Procedures     Large Joint Arthrocentesis: L knee    XR Shoulder 2+ View Right     No orders of the defined types were placed in this encounter.        Dragon dictation utilized    Transcribed from ambient dictation for KAM Washington by Gege Charles.  03/25/24   09:03 EDT    Patient or patient representative verbalized consent to the visit recording.  I have personally performed the services described in this document as transcribed by the above individual, and it is both accurate and complete.

## 2024-05-29 ENCOUNTER — OFFICE VISIT (OUTPATIENT)
Dept: ORTHOPEDIC SURGERY | Facility: CLINIC | Age: 74
End: 2024-05-29
Payer: MEDICARE

## 2024-05-29 VITALS
SYSTOLIC BLOOD PRESSURE: 108 MMHG | HEART RATE: 66 BPM | BODY MASS INDEX: 33.49 KG/M2 | DIASTOLIC BLOOD PRESSURE: 66 MMHG | HEIGHT: 63 IN | WEIGHT: 189 LBS

## 2024-05-29 DIAGNOSIS — M17.12 PRIMARY OSTEOARTHRITIS OF LEFT KNEE: Primary | ICD-10-CM

## 2024-05-29 PROCEDURE — 1159F MED LIST DOCD IN RCRD: CPT | Performed by: ORTHOPAEDIC SURGERY

## 2024-05-29 PROCEDURE — 73562 X-RAY EXAM OF KNEE 3: CPT | Performed by: ORTHOPAEDIC SURGERY

## 2024-05-29 PROCEDURE — 1160F RVW MEDS BY RX/DR IN RCRD: CPT | Performed by: ORTHOPAEDIC SURGERY

## 2024-05-29 PROCEDURE — 99214 OFFICE O/P EST MOD 30 MIN: CPT | Performed by: ORTHOPAEDIC SURGERY

## 2024-05-29 RX ORDER — DOXYCYCLINE HYCLATE 100 MG/1
CAPSULE ORAL
COMMUNITY
Start: 2024-05-16

## 2024-05-29 RX ORDER — ACETAMINOPHEN 500 MG
2 TABLET ORAL
COMMUNITY

## 2024-05-29 NOTE — PROGRESS NOTES
Subjective:     Patient ID: Lisa Gavin is a 73 y.o. female.    Chief Complaint:  Left knee pain, new exacerbation    History of Present Illness  Lisa Gavin presents to clinic today for evaluation of left knee pain.    She has had a recent exacerbation over the last 3 to 4 months where her pain has gotten drastically worse. She has had previous Visco and cortisone injections and has done physical therapy with home exercises with minimal improvement in her symptoms recently. The pain is severe in intensity, aching in nature, occasional sharp pain, particularly with standing, walking, and prolonged weightbearing activities as well as with deep flexion, localizes most of the pain to the anteromedial aspect of her knee. Minimal improvement with rest and activity modification. Denies any left hip or groin pain. Denies radiation of pain from the knee itself. Denies associated numbness or tingling. No fevers, chills, or perspirations.       Social History     Occupational History    Occupation:    Tobacco Use    Smoking status: Never     Passive exposure: Never    Smokeless tobacco: Never   Vaping Use    Vaping status: Never Used   Substance and Sexual Activity    Alcohol use: No    Drug use: No    Sexual activity: Defer      Past Medical History:   Diagnosis Date    A-fib     Arthritis     Congestive heart failure (CHF)     Depression     Disease of thyroid gland     Elevated cholesterol     GERD (gastroesophageal reflux disease)     Headache     Hypertension     Kidney disease     Sleep apnea     Type 2 diabetes mellitus      Past Surgical History:   Procedure Laterality Date    CHOLECYSTECTOMY      COLONOSCOPY N/A 4/29/2022    Procedure: COLONOSCOPY;  Surgeon: Parmjit Traore MD;  Location: [facility];  Service: Gastroenterology;  Laterality: N/A;  Diverticulosis    HYSTERECTOMY  1996    bilateral oophorectomy for heavy bleeding. No HRT    ROTATOR CUFF REPAIR      ACUTE    SHOULDER  "SURGERY Bilateral     hAS HAD ROTATOR CUFF SURGERY ON BOTH SHOULDERS       Family History   Problem Relation Age of Onset    Diabetes Mother     Kidney disease Father     Arthritis Father     Kidney cancer Father     Prostate cancer Father     Cancer Other     Diabetes Other     Glaucoma Other     Rheum arthritis Other     Kidney disease Other     Breast cancer Neg Hx          Review of Systems        Objective:  Vitals:    05/29/24 1337   BP: 108/66   Pulse: 66   Weight: 85.7 kg (189 lb)   Height: 160 cm (63\")         05/29/24  1337   Weight: 85.7 kg (189 lb)     Body mass index is 33.48 kg/m².  Physical Exam    Vital signs reviewed.   General: No acute distress, alert and oriented  Eyes: conjunctiva clear; pupils equally round and reactive  ENT: external ears and nose atraumatic; oropharynx clear  CV: no peripheral edema  Resp: normal respiratory effort  Skin: no rashes or wounds; normal turgor  Psych: mood and affect appropriate; recent and remote memory intact        Ortho Exam       Left knee:  Active range of motion 3 to 120 degrees.  4+ out of 5 strength on flexion and extension.  Moderate effusion.  Maximal tenderness to palpation medial joint line  Positive Carla's with pain along the medial joint line, mild click, stable to varus and valgus stress, 330 degrees.   Grade 1 A Lachman.  Negative anterior and posterior drawer.   No hip pain on logroll, [place] exam.  Negative straight leg raise left lower extremity:   Positive active patellar compression test.   1+ dorsalis pedis pulse.  Positive sensation to light touch in all distributions, left and symmetric to the right.     Imaging:  Left Knee X-Ray  Indication: Pain    AP, Lateral, and [place] views    Findings:  No fracture  No bony lesion  Normal soft tissues  Advanced tricompartmental osteoarthritis with bone-on-bone articulation particular the medial compartment with overall varus alignment noted.  Compared to prior office " x-rays    Assessment:        1. Primary osteoarthritis of left knee           Plan:          Discussed treatment options at length with patient at today's visit. Reviewed with her findings from today's x-rays indicating end stage arthritis. She is having significant daily symptoms that are significantly limiting her activities of daily living including crepitus and pain with limitations of walking, standing, and prolonged weightbearing. Given failure of conservative treatment as noted above in HPI, patient would like to proceed with left total knee arthroplasty at this time.   I reviewed anatomy and a model of a total knee arthroplasty, as well as typical postoperative recovery of 6-12 months before maximal recovery, and possible need for rehabilitation stay after hospitalization. We also discussed risks, benefits, and alternatives of procedure with risks including but not limited to neurovascular damage, bleeding, infection, malalignment, chronic pian, failure of implants, osteolysis, loosening of implants, loss of motion, weakness, stiffness, instability, DVT, pulmonary embolus, death, stroke, complex regional pain syndrome, myocardial infarction, and need for additional procedures. Patient understood all these and had all questions answered before consenting to the procedure. No guarantees were given in regards to results from the surgery. We will have patient medically optimized by their primary care physician and plan on proceeding with surgery at next available date.    We will plan for observation stay and use of I assist device for surgical procedure.   The patient denies the history of DVT or pulmonary embolus. She does have cardiac history with A fib and is on Xarelto. She is diabetic, most recent hemoglobin A1c of 6.7%. All questions answered.        Lisa Gavin was in agreement with plan and had all questions answered.     Orders:  Orders Placed This Encounter   Procedures    XR Knee 3+ View With  [place] Left       Medications:  No orders of the defined types were placed in this encounter.      Followup:  No follow-ups on file.    Diagnoses and all orders for this visit:    1. Primary osteoarthritis of left knee (Primary)  -     XR Knee 3+ View With [place] Left        Body mass index is 33.48 kg/m².    Dictated utilizing Dragon dictation     Transcribed from ambient dictation for Sheldon Reid MD by Chapito Yost.  05/29/24   14:57 EDT    Patient or patient representative verbalized consent to the visit recording.  I have personally performed the services described in this document as transcribed by the above individual, and it is both accurate and complete.

## 2024-06-12 PROBLEM — Z96.659 S/P TOTAL KNEE ARTHROPLASTY: Status: ACTIVE | Noted: 2024-06-12

## 2024-06-12 RX ORDER — ACETAMINOPHEN 325 MG/1
1000 TABLET ORAL ONCE
OUTPATIENT
Start: 2024-06-12 | End: 2024-06-12

## 2024-06-12 RX ORDER — PREGABALIN 150 MG/1
150 CAPSULE ORAL ONCE
OUTPATIENT
Start: 2024-06-12 | End: 2024-06-12

## 2024-06-21 ENCOUNTER — DOCUMENTATION (OUTPATIENT)
Dept: ORTHOPEDIC SURGERY | Facility: CLINIC | Age: 74
End: 2024-06-21
Payer: MEDICARE

## 2024-06-21 RX ORDER — PREDNISONE 10 MG/1
TABLET ORAL
Qty: 21 TABLET | Refills: 0 | Status: SHIPPED | OUTPATIENT
Start: 2024-06-21

## 2024-06-21 RX ORDER — PREDNISONE 10 MG/1
10 TABLET ORAL DAILY
Qty: 6 TABLET | Refills: 0 | Status: SHIPPED | OUTPATIENT
Start: 2024-06-21 | End: 2024-06-21 | Stop reason: SDUPTHER

## 2024-07-26 ENCOUNTER — TELEPHONE (OUTPATIENT)
Dept: ORTHOPEDIC SURGERY | Facility: CLINIC | Age: 74
End: 2024-07-26

## 2024-07-26 NOTE — TELEPHONE ENCOUNTER
Caller: Lisa Pace    Relationship to patient: Self    Best call back number: [phone] (home) [phone] (work)    Patient is needing: [facility] PACE WOULD LIKE TO CANCEL HER SX ON 8/20/24. SHE HAS HAD 2 STINTS PUT IN HER HEART AND CAN'T HAVE SX FOR 6 MONTHS TO A YEAR

## 2024-08-09 ENCOUNTER — OFFICE VISIT (OUTPATIENT)
Dept: ORTHOPEDIC SURGERY | Facility: CLINIC | Age: 74
End: 2024-08-09
Payer: MEDICARE

## 2024-08-09 DIAGNOSIS — M17.12 PRIMARY OSTEOARTHRITIS OF LEFT KNEE: Primary | ICD-10-CM

## 2024-08-09 PROCEDURE — 20610 DRAIN/INJ JOINT/BURSA W/O US: CPT | Performed by: NURSE PRACTITIONER

## 2024-08-09 PROCEDURE — 1160F RVW MEDS BY RX/DR IN RCRD: CPT | Performed by: NURSE PRACTITIONER

## 2024-08-09 PROCEDURE — 1159F MED LIST DOCD IN RCRD: CPT | Performed by: NURSE PRACTITIONER

## 2024-08-09 PROCEDURE — 99024 POSTOP FOLLOW-UP VISIT: CPT | Performed by: NURSE PRACTITIONER

## 2024-08-09 RX ORDER — ISOSORBIDE MONONITRATE 30 MG/1
30 TABLET, EXTENDED RELEASE ORAL DAILY
COMMUNITY

## 2024-08-09 RX ORDER — CLOPIDOGREL BISULFATE 75 MG/1
75 TABLET ORAL DAILY
COMMUNITY
Start: 2024-07-25

## 2024-08-09 RX ORDER — MIRABEGRON 50 MG/1
50 TABLET, EXTENDED RELEASE ORAL DAILY
COMMUNITY
Start: 2024-07-11

## 2024-08-09 RX ORDER — ASCORBATE CALCIUM 500 MG
450 TABLET ORAL
COMMUNITY
Start: 2024-07-16

## 2024-08-09 RX ADMIN — TRIAMCINOLONE ACETONIDE 80 MG: 40 INJECTION, SUSPENSION INTRA-ARTICULAR; INTRAMUSCULAR at 14:27

## 2024-08-09 RX ADMIN — LIDOCAINE HYDROCHLORIDE 8 ML: 10 INJECTION, SOLUTION EPIDURAL; INFILTRATION; INTRACAUDAL; PERINEURAL at 14:27

## 2024-08-09 NOTE — PROGRESS NOTES
Subjective:     Patient ID: Lisa Gavin is a 73 y.o. female.    Chief Complaint:  Follow-up DJD left knee  History of Present Illness  History of Present Illness    Lisa Gavin presents to clinic today for evaluation of her left knee.  Has received corticosteroid injections in the past with symptom improvement.  She has had a recent cardiac event that did result in stent placement.  She was considering total knee arthroplasty however does now have to hold off due to recent cardiac event.  She continues to experience pain along the medial joint line, has retired therefore completing more activity at home therefore is no longer ambulating with a limp.  She is noticing increased pain with weightbearing activities, transitional activities, standing, walking and pleural longed standing.  Pain also present with deep flexion.  She has tried bracing, PT, home exercise program, viscosupplementation injections, steroid injections without significant symptom resolution.  Denies any other concerns present.     Social History     Occupational History    Occupation:    Tobacco Use    Smoking status: Never     Passive exposure: Never    Smokeless tobacco: Never   Vaping Use    Vaping status: Never Used   Substance and Sexual Activity    Alcohol use: No    Drug use: No    Sexual activity: Defer      Past Medical History:   Diagnosis Date    A-fib     Arthritis     Congestive heart failure (CHF)     Depression     Disease of thyroid gland     Elevated cholesterol     GERD (gastroesophageal reflux disease)     Headache     Hypertension     Kidney disease     Sleep apnea     Type 2 diabetes mellitus      Past Surgical History:   Procedure Laterality Date    CHOLECYSTECTOMY      COLONOSCOPY N/A 04/29/2022    Procedure: COLONOSCOPY;  Surgeon: Parmjit Traore MD;  Location: [facility];  Service: Gastroenterology;  Laterality: N/A;  Diverticulosis    CORONARY STENT PLACEMENT      HYSTERECTOMY  1996     bilateral oophorectomy for heavy bleeding. No HRT    ROTATOR CUFF REPAIR      ACUTE    SHOULDER SURGERY Bilateral     hAS HAD ROTATOR CUFF SURGERY ON BOTH SHOULDERS       Family History   Problem Relation Age of Onset    Diabetes Mother     Kidney disease Father     Arthritis Father     Kidney cancer Father     Prostate cancer Father     Cancer Other     Diabetes Other     Glaucoma Other     Rheum arthritis Other     Kidney disease Other     Breast cancer Neg Hx                Objective:  Physical Exam  General: No acute distress.  Eyes: conjunctiva clear; pupils equally round and reactive  ENT: external ears and nose atraumatic; oropharynx clear  CV: no peripheral edema  Resp: normal respiratory effort  Skin: no rashes or wounds; normal turgor  Psych: mood and affect appropriate; recent and remote memory intact    There were no vitals filed for this visit.  There were no vitals filed for this visit.  There is no height or weight on file to calculate BMI.      Ortho Exam     Physical Exam    Left knee examined  Knee range of motion 3 degrees to 115 degrees  Stable varus valgus stress at 3 degrees and 30 degrees  Maximal tenderness present medial compartment  1+ anterior Lachman exam  Moderate to severe swelling, negative effusion  1+ anterior Lachman exam  Positive crepitus throughout arc of motion    Assessment:        1. Primary osteoarthritis of left knee         Assessment & Plan      Plan:  1.  Discussed and of care with patient.  We did discuss treatment options including proceeding with corticosteroid injection since total knee arthroplasty is not optimal at this time.  We did discuss out of surgery for the 3-month interval after the injection.  She does verbalize understanding of all information.  We can repeat the injection and get a 3-month interval or at that time if she is released by cardiology can consider proceeding with total knee arthroplasty.  Gladly see her back in clinic as needed.  All questions  answered.    - Large Joint Arthrocentesis: L knee on 8/9/2024 2:27 PM  Indications: pain  Details: 22 G needle, superolateral approach  Medications: 8 mL lidocaine PF 1% 1 %; 80 mg triamcinolone acetonide 40 MG/ML  Outcome: tolerated well, no immediate complications  Procedure, treatment alternatives, risks and benefits explained, specific risks discussed. Consent was given by the patient. Immediately prior to procedure a time out was called to verify the correct patient, procedure, equipment, support staff and site/side marked as required. Patient was prepped and draped in the usual sterile fashion.         Orders:  Orders Placed This Encounter   Procedures    - Large Joint Arthrocentesis: L knee     No orders of the defined types were placed in this encounter.          Dragon dictation utilized  BMI is >= 30 and <35. (Class 1 Obesity). The following options were offered after discussion;: weight loss educational material (shared in after visit summary)

## 2024-08-12 RX ORDER — TRIAMCINOLONE ACETONIDE 40 MG/ML
80 INJECTION, SUSPENSION INTRA-ARTICULAR; INTRAMUSCULAR
Status: COMPLETED | OUTPATIENT
Start: 2024-08-09 | End: 2024-08-09

## 2024-08-12 RX ORDER — LIDOCAINE HYDROCHLORIDE 10 MG/ML
8 INJECTION, SOLUTION EPIDURAL; INFILTRATION; INTRACAUDAL; PERINEURAL
Status: COMPLETED | OUTPATIENT
Start: 2024-08-09 | End: 2024-08-09

## 2024-08-14 ENCOUNTER — TELEPHONE (OUTPATIENT)
Dept: CARDIAC REHAB | Facility: HOSPITAL | Age: 74
End: 2024-08-14
Payer: MEDICARE

## 2024-08-15 ENCOUNTER — OFFICE VISIT (OUTPATIENT)
Dept: SLEEP MEDICINE | Facility: HOSPITAL | Age: 74
End: 2024-08-15
Payer: MEDICARE

## 2024-08-15 VITALS
HEART RATE: 61 BPM | DIASTOLIC BLOOD PRESSURE: 79 MMHG | HEIGHT: 63 IN | OXYGEN SATURATION: 97 % | SYSTOLIC BLOOD PRESSURE: 130 MMHG | WEIGHT: 185 LBS | BODY MASS INDEX: 32.78 KG/M2

## 2024-08-15 DIAGNOSIS — I10 ESSENTIAL HYPERTENSION: ICD-10-CM

## 2024-08-15 DIAGNOSIS — E66.9 CLASS 1 OBESITY WITH SERIOUS COMORBIDITY AND BODY MASS INDEX (BMI) OF 32.0 TO 32.9 IN ADULT, UNSPECIFIED OBESITY TYPE: ICD-10-CM

## 2024-08-15 DIAGNOSIS — G47.33 OBSTRUCTIVE SLEEP APNEA, ADULT: Primary | ICD-10-CM

## 2024-08-15 PROCEDURE — G0463 HOSPITAL OUTPT CLINIC VISIT: HCPCS

## 2024-08-15 NOTE — PROGRESS NOTES
[facility]  [address]  [address] [place] [id number]  Phone   Fax     SLEEP CLINIC FOLLOW UP PROGRESS NOTE.    Lisa Gavin  [id number]   1950  73 y.o.  female      PCP: Edith Chávez PA      Date of visit: 8/15/2024    Chief Complaint   Patient presents with    Sleep Apnea       Medications and allergies are reviewed by me and documented in the encounter.     SOCIAL (habits pertaining to sleep medicine)  History tobacco use:No   History of alcohol use: 0 per week  Caffeine use: 3-4 beverages/d    HPI:  This is a 73 y.o. PMHx HTN.  Here for management of obstructive sleep apnea (DENIZ 5.3/hr on 4/12/2021 HST).. Patient is using positive airway pressure therapy and the symptoms of sleep apnea have improved significantly on the therapy. Normally patient goes to bed at 11 PM and wakes up at 9 AM .  The patient wakes up 3-4 time(s) during the night and has no problem going back to sleep.  Feels refreshed after waking up.     Overall patient's Impression of their PAP therapy is: No air pressure issues  Current mask barrier to therapy covers bridge of nose plastic interface difficult to keep on face    Denies so clean/ozone use  Denies any particulate matter in mouth/mask/nose  Did not receive a recall notice from [employer]  Current device will be 5 years old 4/2025    Compliance data as reviewed by me with patient room today:  Date range 7/16/2024 - 8/14/2024  Overall use 97%  4-hour sierra 93%  Average days used 8 hours 29 minutes  Device DreamStation auto-CPAP  Settings 8-16 cm H2O  A flex 2  Ramp 4 cm H2O 30 minutes  Average time in large leak 23 seconds  Average AHI 3.1 at  goal  DME: [facility]  Mask used: FFM plastic rim - covers bridge of the nose - plastic interface brand unspecified      -BP in sleep  clinic 130/79  Compliant with home therapies reviewed  States she feels good today    -Obesity  Wt Readings from Last 3 Encounters:   08/15/24 83.9 kg  "(185 lb)   05/29/24 85.7 kg (189 lb)   03/25/24 88.9 kg (196 lb)         -Last seen in sleep clinic~1 year ago on 7/14/2023 by Dr. Banda AHI 2.3 DME [facility] instructed follow-up 1 year      REVIEW OF SYSTEMS:   Is negative unless otherwise noted in HPI  Pre-encounter [place] Sleepiness Scale :Total score: 15  - mask issue   No near miss or MVC 2/2 sleepiness  She actually denies subjective excessive daytime sleepiness in room    Disclaimer History: The above history is based on this sleep physician's in room encounter with the patient. Pre encounter self administered questionnaires are taken into consideration and discussed with patient for any discordance. The above documentation by this sleep physician is the most accurate clinical information determined by in room sleep physician encounter with patient.     PHYSICAL EXAMINATION:  Vitals:    08/15/24 0900   BP: 130/79   Pulse: 61   SpO2: 97%   Weight: 83.9 kg (185 lb)   Height: 160 cm (63\")    Body mass index is 32.77 kg/m².   CONSTITUTIONAL: Well appearing, in no overt pain or respiratory distress   NOSE: No septal defect  RESP SYSTEM:  No overt respiratory distress, speaks in clear sentences without dyspnea, no accessory muscle use  CARDIOVASULAR: No edema noted  NEURO: Oriented x 3, no gross focal deficits     Compliance data as reviewed by me with patient room today:  Date range 7/16/2024 - 8/14/2024  Overall use 97%  4-hour sierra 93%  Average days used 8 hours 29 minutes  Device DreamStation auto-CPAP  Settings 8-16 cm H2O  A flex 2  Ramp 4 cm H2O 30 minutes  Average time in large leak 23 seconds  Average AHI 3.1 at goal  DME: [facility]  Mask used: FFM plastic rim - covers bridge of the nose - plastic interface brand unspecified    ASSESSMENT AND PLAN:  Obstructive sleep apnea ( G 47.33).    -Specific Changes made by me today:  I. After review of compliance data, in visit clinical correlation, and through shared decision making: will not make any changes to " PAP therapy settings*.  II.  Order placed for mask fitting with AirTouch F20 fullface mask (patient may ultimately choose any type mask he wants)  III.Patient request annual [id number] that her sleep apnea is well-controlled I will kaur her same.  Counseled may request sooner follow-up to sleep clinic anytime the patient feels necessary.  The symptoms of sleep apnea have improved with the device and the treatment.  Patient's compliance with the device is excellent for treatment of sleep apnea.  I have independently reviewed the smart card down load and discussed with the patient the download data and encouarged the patient to continue to use the device.The residual AHI is acceptable. The device is benefiting the patient and the device is medically necessary.  Without proper control of sleep apnea and good compliance there is a increased risk for hypertension, diabetes mellitus and nonrestorative sleep with hypersomnia which can increase risk for motor vehicle accidents.  Untreated sleep apnea is also a risk factor for development of atrial fibrillation, pulmonary hypertension, insulin resistance and stroke. The patient is also instructed to get the supplies from the [employer] and and change them on a regular basis.  A prescription for supplies has been sent to the [facility].  I have also discussed the good sleep hygiene habits and adequate amount of sleep needed for good health.  Obesity, with BMI is Body mass index is 32.77 kg/m².. Counseled weight loss will be beneficial for reduction in severity of sleep apnea, healthy diet/exercise to achieve same, follow up with primary care physician for serial monitoring and to further guide management.  Essential hypertension [I10], Compliant wit home therapies reviewed.  Counseled patient PAP therapy compliance for treatment of obstructive sleep apnea may be beneficial for this comorbid condition.  Follow-up with PCP as previous for hypertension       Follow up in  1 year. Patient's questions were answered.        EMR Dragon/Transcription disclaimer:   Much of this encounter note is an electronic transcription/translation of spoken language to printed text. The electronic translation of spoken language may permit erroneous, or at times, nonsensical words or phrases to be inadvertently transcribed; Although I have reviewed the note for such errors, some may still exist.       NPI #: [id number]    Modesto Duron,   Sleep Medicine  [facility]  08/15/24

## 2024-08-16 ENCOUNTER — TRANSCRIBE ORDERS (OUTPATIENT)
Dept: ADMINISTRATIVE | Facility: HOSPITAL | Age: 74
End: 2024-08-16
Payer: MEDICARE

## 2024-08-16 ENCOUNTER — HOSPITAL ENCOUNTER (OUTPATIENT)
Dept: GENERAL RADIOLOGY | Facility: HOSPITAL | Age: 74
Discharge: HOME | End: 2024-08-16
Payer: MEDICARE

## 2024-08-16 DIAGNOSIS — M54.2 CERVICALGIA: ICD-10-CM

## 2024-08-16 DIAGNOSIS — M54.2 CERVICALGIA: Primary | ICD-10-CM

## 2024-08-16 PROCEDURE — 72040 X-RAY EXAM NECK SPINE 2-3 VW: CPT

## 2024-08-26 ENCOUNTER — TRANSCRIBE ORDERS (OUTPATIENT)
Dept: ADMINISTRATIVE | Facility: HOSPITAL | Age: 74
End: 2024-08-26
Payer: MEDICARE

## 2024-08-26 DIAGNOSIS — Z12.31 ENCOUNTER FOR SCREENING MAMMOGRAM FOR BREAST CANCER: Primary | ICD-10-CM

## 2024-09-16 DIAGNOSIS — Z95.5 S/P CORONARY ARTERY STENT PLACEMENT: Primary | ICD-10-CM

## 2024-10-07 ENCOUNTER — HOSPITAL ENCOUNTER (OUTPATIENT)
Dept: MAMMOGRAPHY | Facility: HOSPITAL | Age: 74
Discharge: HOME | End: 2024-10-07
Admitting: PHYSICIAN ASSISTANT
Payer: MEDICARE

## 2024-10-07 DIAGNOSIS — Z12.31 ENCOUNTER FOR SCREENING MAMMOGRAM FOR BREAST CANCER: ICD-10-CM

## 2024-10-07 PROCEDURE — 77063 BREAST TOMOSYNTHESIS BI: CPT | Performed by: RADIOLOGY

## 2024-10-07 PROCEDURE — 77067 SCR MAMMO BI INCL CAD: CPT

## 2024-10-07 PROCEDURE — 77067 SCR MAMMO BI INCL CAD: CPT | Performed by: RADIOLOGY

## 2024-10-07 PROCEDURE — 77063 BREAST TOMOSYNTHESIS BI: CPT

## 2024-10-16 ENCOUNTER — TELEPHONE (OUTPATIENT)
Dept: CARDIAC REHAB | Facility: HOSPITAL | Age: 74
End: 2024-10-16
Payer: MEDICARE

## 2024-11-19 ENCOUNTER — OFFICE VISIT (OUTPATIENT)
Dept: ORTHOPEDIC SURGERY | Facility: CLINIC | Age: 74
End: 2024-11-19
Payer: MEDICARE

## 2024-11-19 VITALS
DIASTOLIC BLOOD PRESSURE: 69 MMHG | SYSTOLIC BLOOD PRESSURE: 151 MMHG | HEART RATE: 66 BPM | BODY MASS INDEX: 33.31 KG/M2 | WEIGHT: 188 LBS | HEIGHT: 63 IN

## 2024-11-19 DIAGNOSIS — M25.511 RIGHT SHOULDER PAIN, UNSPECIFIED CHRONICITY: ICD-10-CM

## 2024-11-19 DIAGNOSIS — M17.12 PRIMARY OSTEOARTHRITIS OF LEFT KNEE: Primary | ICD-10-CM

## 2024-11-19 DIAGNOSIS — M75.51 SUBACROMIAL BURSITIS OF RIGHT SHOULDER JOINT: ICD-10-CM

## 2024-11-19 PROCEDURE — 73030 X-RAY EXAM OF SHOULDER: CPT | Performed by: NURSE PRACTITIONER

## 2024-11-19 PROCEDURE — 20610 DRAIN/INJ JOINT/BURSA W/O US: CPT | Performed by: NURSE PRACTITIONER

## 2024-11-19 PROCEDURE — 99213 OFFICE O/P EST LOW 20 MIN: CPT | Performed by: NURSE PRACTITIONER

## 2024-11-19 RX ORDER — RIVAROXABAN 15 MG/1
15 TABLET, FILM COATED ORAL
COMMUNITY

## 2024-11-19 RX ORDER — TRIAMCINOLONE ACETONIDE 40 MG/ML
80 INJECTION, SUSPENSION INTRA-ARTICULAR; INTRAMUSCULAR
Status: COMPLETED | OUTPATIENT
Start: 2024-11-19 | End: 2024-11-19

## 2024-11-19 RX ORDER — LIDOCAINE HYDROCHLORIDE 10 MG/ML
8 INJECTION, SOLUTION EPIDURAL; INFILTRATION; INTRACAUDAL; PERINEURAL
Status: COMPLETED | OUTPATIENT
Start: 2024-11-19 | End: 2024-11-19

## 2024-11-19 RX ORDER — LIDOCAINE HYDROCHLORIDE 10 MG/ML
4 INJECTION, SOLUTION EPIDURAL; INFILTRATION; INTRACAUDAL; PERINEURAL
Status: COMPLETED | OUTPATIENT
Start: 2024-11-19 | End: 2024-11-19

## 2024-11-19 RX ADMIN — LIDOCAINE HYDROCHLORIDE 4 ML: 10 INJECTION, SOLUTION EPIDURAL; INFILTRATION; INTRACAUDAL; PERINEURAL at 09:45

## 2024-11-19 RX ADMIN — TRIAMCINOLONE ACETONIDE 80 MG: 40 INJECTION, SUSPENSION INTRA-ARTICULAR; INTRAMUSCULAR at 09:45

## 2024-11-19 RX ADMIN — TRIAMCINOLONE ACETONIDE 80 MG: 40 INJECTION, SUSPENSION INTRA-ARTICULAR; INTRAMUSCULAR at 09:46

## 2024-11-19 RX ADMIN — LIDOCAINE HYDROCHLORIDE 8 ML: 10 INJECTION, SOLUTION EPIDURAL; INFILTRATION; INTRACAUDAL; PERINEURAL at 09:46

## 2024-11-19 NOTE — PROGRESS NOTES
Subjective:     Patient ID: Lisa Gavin is a 74 y.o. female.    Chief Complaint:  Follow-up DJD left knee  Subacromial bursitis right shoulder, acute onset of pain post fall right shoulder  History of Present Illness  History of Present Illness  The patient is a 74-year-old female who presents to the clinic today for evaluation of her left knee and right shoulder.    She experienced a fall in 08/2024, impacting her right upper extremity. Since then, she has been extending it. A corticosteroid injection was administered in 03/2024, which seemed to alleviate her symptoms. However, she reports increased pain when reaching overhead or performing repetitive motions. She tends to keep her arms close and tucked in. Any shoulder abduction or lateral movements cause her significant discomfort. She also reports swelling in her shoulder.    She also reports increased swelling in her left knee. Due to a cardiac stent placement over the summer, she is currently unable to undergo any surgical intervention. She experiences pain when extending and flexing her knee, describing it as a grinding, aching sensation. She rates her discomfort as 9 to 10 out of 10. She also reports swelling in her knee.    She has not had any recent x-ray, MRI, or CT scans of the shoulder and does not express any other concerns.       Social History     Occupational History    Occupation:    Tobacco Use    Smoking status: Never     Passive exposure: Never    Smokeless tobacco: Never   Vaping Use    Vaping status: Never Used   Substance and Sexual Activity    Alcohol use: No    Drug use: No    Sexual activity: Defer      Past Medical History:   Diagnosis Date    A-fib     Arthritis     Congestive heart failure (CHF)     Depression     Disease of thyroid gland     Elevated cholesterol     GERD (gastroesophageal reflux disease)     Headache     Hypertension     Kidney disease     Sleep apnea     Type 2 diabetes mellitus      Past  "Surgical History:   Procedure Laterality Date    CHOLECYSTECTOMY      COLONOSCOPY N/A 04/29/2022    Procedure: COLONOSCOPY;  Surgeon: Parmjit Traore MD;  Location: [facility];  Service: Gastroenterology;  Laterality: N/A;  Diverticulosis    CORONARY STENT PLACEMENT      HYSTERECTOMY  1996    bilateral oophorectomy for heavy bleeding. No HRT    ROTATOR CUFF REPAIR      ACUTE    SHOULDER SURGERY Bilateral     hAS HAD ROTATOR CUFF SURGERY ON BOTH SHOULDERS       Family History   Problem Relation Age of Onset    Diabetes Mother     Kidney disease Father     Arthritis Father     Kidney cancer Father     Prostate cancer Father     Cancer Other     Diabetes Other     Glaucoma Other     Rheum arthritis Other     Kidney disease Other     Breast cancer Neg Hx                Objective:  Physical Exam    Vital signs reviewed.   General: No acute distress.  Eyes: conjunctiva clear; pupils equally round and reactive  ENT: external ears and nose atraumatic; oropharynx clear  CV: no peripheral edema  Resp: normal respiratory effort  Skin: no rashes or wounds; normal turgor  Psych: mood and affect appropriate; recent and remote memory intact    Vitals:    11/19/24 0908   BP: 151/69   Pulse: 66   Weight: 85.3 kg (188 lb)   Height: 160 cm (63\")         11/19/24  0908   Weight: 85.3 kg (188 lb)     Body mass index is 33.3 kg/m².      Ortho Exam     Physical Exam  Left knee exam  Knee range of motion 0 to 110 degrees  Stable to varus and valgus stress at 0 degrees and 30 degrees  Moderate to severe swelling, positive effusion  Positive active patellar compression test clinic    Right shoulder exam  Active forward flexion 90 degrees  External rotation 45 degrees  Internal rotation to side  Positive deltoid firing  Positive Llanos, positive Neer's exam  Negative drop arm exam    Imaging:  Right Shoulder X-Ray  Indication: Pain  AP Internal and External Rotation views    Findings:  No fracture  No bony lesion  Normal soft " tissues  Evidence of prior rotator cuff repair, AC joint degeneration, glenohumeral degeneration    prior studies were available for comparison.    Assessment:        1. Right shoulder pain, unspecified chronicity    2. Primary osteoarthritis of left knee    3. Subacromial bursitis of right shoulder joint         Assessment & Plan  1. Right shoulder pain.  The patient has been experiencing increased pain with reaching over her head and repetitive motion since a fall in August. She keeps her arms close and tucks them in, experiencing increased pain with shoulder abduction and lateral movements. A corticosteroid injection was previously administered in March, which provided symptom improvement. She agreed to proceed with another corticosteroid injection in the right shoulder. Ice application to the injection site is recommended this evening. If there is no improvement with the steroid injection, advanced imaging, including an MRI, will be considered in the future.    2. Left knee pain.  The patient is experiencing increased swelling and pain with extension and flexion of the left knee, rating the discomfort as 9-10 out of 10. She has a history of cardiac stent placement over the summer, making surgical intervention not an option at this time. She agreed to proceed with aspiration and a steroid injection in the left knee. Ice application to the injection site is recommended this evening. The injections can be repeated at 3-month intervals.      Plan:  Large Joint Arthrocentesis: R subacromial bursa  Date/Time: 11/19/2024 9:45 AM  Consent given by: patient  Site marked: site marked  Timeout: Immediately prior to procedure a time out was called to verify the correct patient, procedure, equipment, support staff and site/side marked as required   Supporting Documentation  Indications: pain   Procedure Details  Location: shoulder - R subacromial bursa  Preparation: Patient was prepped and draped in the usual sterile  fashion  Needle size: 22 G  Approach: lateral  Medications administered: 80 mg triamcinolone acetonide 40 MG/ML; 4 mL lidocaine PF 1% 1 %      Large Joint Arthrocentesis: L knee  Date/Time: 11/19/2024 9:46 AM  Consent given by: patient  Site marked: site marked  Timeout: Immediately prior to procedure a time out was called to verify the correct patient, procedure, equipment, support staff and site/side marked as required   Supporting Documentation  Indications: pain   Procedure Details  Location: knee - L knee  Preparation: Patient was prepped and draped in the usual sterile fashion  Needle size: 18 G  Approach: lateral  Medications administered: 80 mg triamcinolone acetonide 40 MG/ML; 8 mL lidocaine PF 1% 1 %  Aspirate amount: 8 mL  Aspirate: yellow  Patient tolerance: patient tolerated the procedure well with no immediate complications        Orders:  Orders Placed This Encounter   Procedures    Large Joint Arthrocentesis: R subacromial bursa    Large Joint Arthrocentesis: L knee    XR Shoulder 2+ View Right     No orders of the defined types were placed in this encounter.      Dragon dictation utilized          Patient or patient representative verbalized consent for the use of Ambient Listening during the visit with  KAM Washington for chart documentation. 11/19/2024  17:09 EST

## 2024-12-04 ENCOUNTER — TRANSCRIBE ORDERS (OUTPATIENT)
Dept: BONE DENSITY | Facility: HOSPITAL | Age: 74
End: 2024-12-04
Payer: MEDICARE

## 2024-12-04 DIAGNOSIS — Z78.0 MENOPAUSE: Primary | ICD-10-CM

## 2024-12-04 DIAGNOSIS — Z78.0 MENOPAUSE PRESENT: ICD-10-CM

## 2024-12-20 ENCOUNTER — APPOINTMENT (OUTPATIENT)
Dept: BONE DENSITY | Facility: HOSPITAL | Age: 74
End: 2024-12-20
Payer: MEDICARE

## 2024-12-20 DIAGNOSIS — Z78.0 MENOPAUSE PRESENT: ICD-10-CM

## 2024-12-20 DIAGNOSIS — Z78.0 MENOPAUSE: ICD-10-CM

## 2024-12-20 PROCEDURE — 77080 DXA BONE DENSITY AXIAL: CPT

## 2025-02-12 ENCOUNTER — TELEPHONE (OUTPATIENT)
Dept: ORTHOPEDIC SURGERY | Facility: CLINIC | Age: 75
End: 2025-02-12
Payer: MEDICARE

## 2025-02-12 ENCOUNTER — APPOINTMENT (OUTPATIENT)
Dept: ULTRASOUND IMAGING | Facility: HOSPITAL | Age: 75
End: 2025-02-12
Payer: MEDICARE

## 2025-02-12 ENCOUNTER — APPOINTMENT (OUTPATIENT)
Dept: GENERAL RADIOLOGY | Facility: HOSPITAL | Age: 75
End: 2025-02-12
Payer: MEDICARE

## 2025-02-12 ENCOUNTER — TELEPHONE (OUTPATIENT)
Dept: ORTHOPEDIC SURGERY | Facility: CLINIC | Age: 75
End: 2025-02-12

## 2025-02-12 ENCOUNTER — HOSPITAL ENCOUNTER (EMERGENCY)
Facility: HOSPITAL | Age: 75
Discharge: HOME | End: 2025-02-12
Attending: EMERGENCY MEDICINE | Admitting: STUDENT IN AN ORGANIZED HEALTH CARE EDUCATION/TRAINING PROGRAM
Payer: MEDICARE

## 2025-02-12 VITALS
DIASTOLIC BLOOD PRESSURE: 66 MMHG | OXYGEN SATURATION: 97 % | RESPIRATION RATE: 18 BRPM | BODY MASS INDEX: 33.32 KG/M2 | HEART RATE: 78 BPM | HEIGHT: 63 IN | SYSTOLIC BLOOD PRESSURE: 139 MMHG | TEMPERATURE: 98 F | WEIGHT: 188.05 LBS

## 2025-02-12 DIAGNOSIS — M25.461 EFFUSION OF RIGHT KNEE: Primary | ICD-10-CM

## 2025-02-12 LAB
ALBUMIN SERPL-MCNC: 3.8 G/DL (ref 3.5–5.2)
ALBUMIN/GLOB SERPL: 1.2 G/DL
ALP SERPL-CCNC: 63 U/L (ref 39–117)
ALT SERPL W P-5'-P-CCNC: 6 U/L (ref 1–33)
ANION GAP SERPL CALCULATED.3IONS-SCNC: 14.2 MMOL/L (ref 5–15)
AST SERPL-CCNC: 13 U/L (ref 1–32)
BASOPHILS # BLD AUTO: 0.02 10*3/MM3 (ref 0–0.2)
BASOPHILS NFR BLD AUTO: 0.3 % (ref 0–1.5)
BILIRUB SERPL-MCNC: 0.2 MG/DL (ref 0–1.2)
BUN SERPL-MCNC: 26 MG/DL (ref 8–23)
BUN/CREAT SERPL: 18.2 (ref 7–25)
CALCIUM SPEC-SCNC: 8.9 MG/DL (ref 8.6–10.5)
CHLORIDE SERPL-SCNC: 103 MMOL/L (ref 98–107)
CO2 SERPL-SCNC: 22.8 MMOL/L (ref 22–29)
CREAT SERPL-MCNC: 1.43 MG/DL (ref 0.57–1)
DEPRECATED RDW RBC AUTO: 50.9 FL (ref 37–54)
EGFRCR SERPLBLD CKD-EPI 2021: 38.8 ML/MIN/1.73
EOSINOPHIL # BLD AUTO: 0.16 10*3/MM3 (ref 0–0.4)
EOSINOPHIL NFR BLD AUTO: 2.4 % (ref 0.3–6.2)
ERYTHROCYTE [DISTWIDTH] IN BLOOD BY AUTOMATED COUNT: 13.5 % (ref 12.3–15.4)
GLOBULIN UR ELPH-MCNC: 3.2 GM/DL
GLUCOSE SERPL-MCNC: 209 MG/DL (ref 65–99)
HCT VFR BLD AUTO: 29.9 % (ref 34–46.6)
HGB BLD-MCNC: 9.5 G/DL (ref 12–15.9)
IMM GRANULOCYTES # BLD AUTO: 0.03 10*3/MM3 (ref 0–0.05)
IMM GRANULOCYTES NFR BLD AUTO: 0.5 % (ref 0–0.5)
LYMPHOCYTES # BLD AUTO: 1.17 10*3/MM3 (ref 0.7–3.1)
LYMPHOCYTES NFR BLD AUTO: 17.8 % (ref 19.6–45.3)
MCH RBC QN AUTO: 32.3 PG (ref 26.6–33)
MCHC RBC AUTO-ENTMCNC: 31.8 G/DL (ref 31.5–35.7)
MCV RBC AUTO: 101.7 FL (ref 79–97)
MONOCYTES # BLD AUTO: 0.44 10*3/MM3 (ref 0.1–0.9)
MONOCYTES NFR BLD AUTO: 6.7 % (ref 5–12)
NEUTROPHILS NFR BLD AUTO: 4.74 10*3/MM3 (ref 1.7–7)
NEUTROPHILS NFR BLD AUTO: 72.3 % (ref 42.7–76)
PLATELET # BLD AUTO: 254 10*3/MM3 (ref 140–450)
PMV BLD AUTO: 11 FL (ref 6–12)
POTASSIUM SERPL-SCNC: 3.4 MMOL/L (ref 3.5–5.2)
PROT SERPL-MCNC: 7 G/DL (ref 6–8.5)
RBC # BLD AUTO: 2.94 10*6/MM3 (ref 3.77–5.28)
SODIUM SERPL-SCNC: 140 MMOL/L (ref 136–145)
WBC NRBC COR # BLD AUTO: 6.56 10*3/MM3 (ref 3.4–10.8)

## 2025-02-12 PROCEDURE — 73562 X-RAY EXAM OF KNEE 3: CPT

## 2025-02-12 PROCEDURE — 85025 COMPLETE CBC W/AUTO DIFF WBC: CPT | Performed by: PHYSICIAN ASSISTANT

## 2025-02-12 PROCEDURE — 80053 COMPREHEN METABOLIC PANEL: CPT | Performed by: PHYSICIAN ASSISTANT

## 2025-02-12 PROCEDURE — 99284 EMERGENCY DEPT VISIT MOD MDM: CPT | Performed by: EMERGENCY MEDICINE

## 2025-02-12 PROCEDURE — 63710000001 ONDANSETRON ODT 4 MG TABLET DISPERSIBLE: Performed by: EMERGENCY MEDICINE

## 2025-02-12 PROCEDURE — 93971 EXTREMITY STUDY: CPT

## 2025-02-12 RX ORDER — ONDANSETRON 4 MG/1
4 TABLET, ORALLY DISINTEGRATING ORAL ONCE
Status: COMPLETED | OUTPATIENT
Start: 2025-02-12 | End: 2025-02-12

## 2025-02-12 RX ORDER — PREDNISONE 20 MG/1
TABLET ORAL
Qty: 18 TABLET | Refills: 0 | Status: SHIPPED | OUTPATIENT
Start: 2025-02-12 | End: 2025-02-20

## 2025-02-12 RX ORDER — HYDROCODONE BITARTRATE AND ACETAMINOPHEN 5; 325 MG/1; MG/1
1 TABLET ORAL ONCE
Status: DISCONTINUED | OUTPATIENT
Start: 2025-02-12 | End: 2025-02-12

## 2025-02-12 RX ORDER — ONDANSETRON 4 MG/1
4 TABLET, ORALLY DISINTEGRATING ORAL ONCE
Status: DISCONTINUED | OUTPATIENT
Start: 2025-02-12 | End: 2025-02-12

## 2025-02-12 RX ORDER — HYDROCODONE BITARTRATE AND ACETAMINOPHEN 5; 325 MG/1; MG/1
1 TABLET ORAL ONCE
Status: COMPLETED | OUTPATIENT
Start: 2025-02-12 | End: 2025-02-12

## 2025-02-12 RX ORDER — SODIUM CHLORIDE 0.9 % (FLUSH) 0.9 %
10 SYRINGE (ML) INJECTION AS NEEDED
Status: DISCONTINUED | OUTPATIENT
Start: 2025-02-12 | End: 2025-02-12 | Stop reason: HOSPADM

## 2025-02-12 RX ADMIN — ONDANSETRON 4 MG: 4 TABLET, ORALLY DISINTEGRATING ORAL at 16:02

## 2025-02-12 RX ADMIN — HYDROCODONE BITARTRATE AND ACETAMINOPHEN 1 TABLET: 5; 325 TABLET ORAL at 16:02

## 2025-02-12 NOTE — TELEPHONE ENCOUNTER
SPOKE TO PATIENT ON THE PHONE. PATIENT STATES HER LEG IS SWOLLEN AND IS HOT TO TOUCH. ON A PAIN SCALE OF 01-10 IS IS A 20.   OFFICE HAS SUGGESTED PATIENT GO TO THE ER AS WE CANNOT GET HER PUT ON THE SCHEDULE TODAY AND IF THERE IS ANYTHING SERIOUS IS GOING ON THE ER SHOULD BE ABLE TO HELP PATIENT.

## 2025-02-12 NOTE — TELEPHONE ENCOUNTER
CALLED PATIENT AND SUGGESTED PATIENT GO TO THE ER. PATIENT STATES HER PAIN LEVEL ON A SCALE 1-10 IS A 20 AND SHE HAS SIGNIFICANT SWELLING IN HER LEG AND IT IS HOT TO TOUCH.

## 2025-02-12 NOTE — DISCHARGE INSTRUCTIONS
Wear Ace wrap, apply ice to affected area 3 times daily, prednisone as prescribed, follow-up with Dr. Reid next week per scheduled appointment.

## 2025-02-12 NOTE — TELEPHONE ENCOUNTER
Caller: Lisa Gavin     Relationship: [unfilled]     Best call back number: [phone]*    What is your medical concern? RIGHT KNEE SWELLING    How long has this issue been going on? BRET MORNING    Is your provider already aware of this issue? NO    Have you been treated for this issue? NO    PT IS CALLING TO SEE IF TAVO CAN SEE HER ASAP.. HER RIGHT KNEE IS SWOLLEN AND CANNOT WALK ON IT.. SHE STATES THAT IT IS GETTING WORSE EACH DAY.. PLEASE ADVISE..

## 2025-02-12 NOTE — TELEPHONE ENCOUNTER
Caller: Lisa Gavin    Relationship to patient: Self    Best call back number: [phone]*    Patient is needing: PT IS CALLING TO CHECK THE STATUS OF GETTING SCHEDULED.. PLEASE ADVISE..

## 2025-02-12 NOTE — ED NOTES
Ace wrap given to pt to take home. Attempted to wrap pt knee prior to discharged and she stated that she did not want it touched at the moment. She stated that she is still in pain with no relief. I stated that I would let her RN know and see what we could do for her and I would send her home with Ace wrap for her to place on her knee when she is in less pain. Pt agreeable and thanked me.

## 2025-02-12 NOTE — ED PROVIDER NOTES
Subjective   History of Present Illness 74-year-old female presents to the ER with complaints of right knee pain that started on Sunday where she woke up with that and is progressively gotten worse.  There is a red area on the top of the knee that she states is been there since the beginning.  Patient is on Xarelto for A-fib and Plavix due to stents.  She has been out of Xarelto for 1 month and they are working with the company to get this medicine cheaper.  Patient denies any fever or chills.  She does have a history of gout but usually only in the right great toe.  She states she has never had in the knee.  Pain with range of motion noted.  Right calf visibly enlarged.  No chest pain or shortness of breath.    Review of Systems no fever chills cough congestion chest pain shortness of breath.  No nausea vomiting diarrhea or abdominal pain.  Positive for right calf edema, positive for right knee pain with some erythema overlying the patella more lateral than medial.    Past Medical History:   Diagnosis Date    A-fib     Arthritis     Congestive heart failure (CHF)     Depression     Disease of thyroid gland     Elevated cholesterol     GERD (gastroesophageal reflux disease)     Headache     Hypertension     Kidney disease     Sleep apnea     Type 2 diabetes mellitus        Allergies   Allergen Reactions    Nsaids Other (See Comments)     CKD        Past Surgical History:   Procedure Laterality Date    CHOLECYSTECTOMY      COLONOSCOPY N/A 04/29/2022    Procedure: COLONOSCOPY;  Surgeon: Parmjit Traore MD;  Location: [facility];  Service: Gastroenterology;  Laterality: N/A;  Diverticulosis    CORONARY STENT PLACEMENT      HYSTERECTOMY  1996    bilateral oophorectomy for heavy bleeding. No HRT    ROTATOR CUFF REPAIR      ACUTE    SHOULDER SURGERY Bilateral     hAS HAD ROTATOR CUFF SURGERY ON BOTH SHOULDERS       Family History   Problem Relation Age of Onset    Diabetes Mother     Kidney disease Father      Arthritis Father     Kidney cancer Father     Prostate cancer Father     Cancer Other     Diabetes Other     Glaucoma Other     Rheum arthritis Other     Kidney disease Other     Breast cancer Neg Hx        Social History     Socioeconomic History    Marital status:    Tobacco Use    Smoking status: Never     Passive exposure: Never    Smokeless tobacco: Never   Vaping Use    Vaping status: Never Used   Substance and Sexual Activity    Alcohol use: No    Drug use: No    Sexual activity: Defer           Objective   Physical Exam  General: Mild distress  Head: Atraumatic normocephalic  ENT: EOMI PERRL, mucous membranes moist  Neck: No lymphadenopathy  Chest: Clear to auscultation bilaterally without wheezing rhonchi or rales  Cardiovascular: Regular rate and rhythm without murmur  Abdomen: Soft nontender good bowel sounds no hernia.  Musculoskeletal: Left fourth fingertip with a 4 mm laceration with some bleeding.  Patient has good sensation and full active range of motion.  Neuro: Alert and oriented x 3  Psych: Cooperative.      Procedures           ED Course  ED Course as of 02/12/25 1531   Wed Feb 12, 2025   1346 US Venous Doppler Lower Extremity Right (duplex) [SB]      ED Course User Index  [SB] Parmjit Lua, GENNA                                                       Medical Decision Making  Patient presented to the ER with history of swollen and painful right knee since Sunday.  She thought she could have a clot in it.  I ordered the x-ray of the right knee and a DVT study as well.  The DVT study was negative and the right knee showed moderate degenerative changes with a joint effusion.  Patient's got a follow-up with her orthopedic doctor next week for reevaluation.  I suspect this is most likely gout and will place patient on prednisone and she is to continue allopurinol.  If condition worsens return to ER for.    Differential diagnosis: Acute gouty arthritis, degenerative joint disease,  hemarthrosis.    Amount and/or Complexity of Data Reviewed  Labs: ordered.  Radiology: ordered. Decision-making details documented in ED Course.    Risk  Prescription drug management.        Final diagnoses:   Effusion of right knee       ED Disposition  ED Disposition       ED Disposition   Discharge    Condition   Stable    Comment   --               No follow-up provider specified.       Medication List        Changed      predniSONE 20 MG tablet  Commonly known as: DELTASONE  Take 3 tablets by mouth See Admin Instructions for 3 days, THEN 2 tablets See Admin Instructions for 3 days, THEN 1 tablet See Admin Instructions for 3 days.  Start taking on: February 12, 2025  What changed:   medication strength  See the new instructions.               Where to Get Your Medications        These medications were sent to [facility]/pharmacy #[id number] - [place], [place] - [address] - [phone]  - [phone] [address] [address]      Phone: [phone]   predniSONE 20 MG tablet            Parmjit Lua PA-C  02/12/25 5374       Parmjit Lua PA-C  02/12/25 3363

## 2025-02-19 ENCOUNTER — OFFICE VISIT (OUTPATIENT)
Dept: ORTHOPEDIC SURGERY | Facility: CLINIC | Age: 75
End: 2025-02-19
Payer: MEDICARE

## 2025-02-19 VITALS — BODY MASS INDEX: 33.31 KG/M2 | HEIGHT: 63 IN | WEIGHT: 188 LBS

## 2025-02-19 DIAGNOSIS — M17.11 PRIMARY OSTEOARTHRITIS OF RIGHT KNEE: Primary | ICD-10-CM

## 2025-02-19 PROCEDURE — 99213 OFFICE O/P EST LOW 20 MIN: CPT | Performed by: NURSE PRACTITIONER

## 2025-02-19 RX ORDER — GLYBURIDE-METFORMIN HYDROCHLORIDE 2.5; 5 MG/1; MG/1
1 TABLET ORAL
COMMUNITY
Start: 2024-12-23 | End: 2025-04-22

## 2025-02-19 NOTE — PROGRESS NOTES
Subjective:     Patient ID: Lisa Gavin is a 74 y.o. female.    Chief Complaint:  Right knee pain-new issue to examiner  History of Present Illness  History of Present Illness  The patient is a 74-year-old female who presents to the clinic today for evaluation of her right knee.    She began experiencing a severe onset of acute pain in her right knee. Her symptoms included swelling, redness, and pain with all motion. She did present to [facility] ER for concerns over a possible DVT. A venous duplex was completed, which was negative. X-ray imaging was also performed and is available for review in the chart. She was started on prednisone, which seems to be providing symptom improvement.    She was experiencing previous pain in the shoulder, but it seems to have subsided.    MEDICATIONS  Current: Prednisone.         Social History     Occupational History    Occupation:    Tobacco Use    Smoking status: Never     Passive exposure: Never    Smokeless tobacco: Never   Vaping Use    Vaping status: Never Used   Substance and Sexual Activity    Alcohol use: No    Drug use: No    Sexual activity: Defer      Past Medical History:   Diagnosis Date    A-fib     Arthritis     Congestive heart failure (CHF)     Depression     Disease of thyroid gland     Elevated cholesterol     GERD (gastroesophageal reflux disease)     Headache     Hypertension     Kidney disease     Sleep apnea     Type 2 diabetes mellitus      Past Surgical History:   Procedure Laterality Date    CHOLECYSTECTOMY      COLONOSCOPY N/A 04/29/2022    Procedure: COLONOSCOPY;  Surgeon: Parmjit Traore MD;  Location: [facility];  Service: Gastroenterology;  Laterality: N/A;  Diverticulosis    CORONARY STENT PLACEMENT      HYSTERECTOMY  1996    bilateral oophorectomy for heavy bleeding. No HRT    ROTATOR CUFF REPAIR      ACUTE    SHOULDER SURGERY Bilateral     hAS HAD ROTATOR CUFF SURGERY ON BOTH SHOULDERS       Family  "History   Problem Relation Age of Onset    Diabetes Mother     Kidney disease Father     Arthritis Father     Kidney cancer Father     Prostate cancer Father     Cancer Other     Diabetes Other     Glaucoma Other     Rheum arthritis Other     Kidney disease Other     Breast cancer Neg Hx                Objective:  Physical Exam    General: No acute distress.  Eyes: conjunctiva clear; pupils equally round and reactive  ENT: external ears and nose atraumatic; oropharynx clear  CV: no peripheral edema  Resp: normal respiratory effort  Skin: no rashes or wounds; normal turgor  Psych: mood and affect appropriate; recent and remote memory intact    Vitals:    02/19/25 0800   Weight: 85.3 kg (188 lb)   Height: 160 cm (63\")         02/19/25  0800   Weight: 85.3 kg (188 lb)     Body mass index is 33.3 kg/m².      Right Knee Exam     Tenderness   The patient is experiencing tenderness in the patella.    Range of Motion   Extension:  0   Right knee flexion: 125.     Tests   Carla:  Medial - negative Lateral - negative  Varus: negative Valgus: negative  Lachman:  Anterior - 1+      Drawer:  Anterior - negative    Posterior - negative  Patellar apprehension: positive    Other   Erythema: absent  Sensation: normal  Pulse: present  Swelling: moderate             Physical Exam      Imaging:  XR Knee 3 View Right    Result Date: 2/12/2025  Impression: Moderate medial and patellofemoral and mild lateral compartment arthritis of the knee with moderate size joint effusion. No fractures or dislocations. Electronically Signed: Janneth Mueller MD  2/12/2025 1:36 PM EST  Workstation ID: [id number]    Independently reviewed three-view x-ray images right knee moderate moderate medial and patellofemoral narrowing, positive knee effusion    Assessment:        1. Primary osteoarthritis of right knee         Assessment & Plan  1. Right knee pain.  She reports significant improvement in her symptoms following the initiation of prednisone therapy. " The x-ray imaging results were reviewed and discussed. Treatment options including a corticosteroid injection into the knee or aspiration with cultures and sensitivity for further lab evaluation were discussed. However, given her current improvement, these interventions will be deferred. If symptoms persist, she is encouraged to contact the clinic. She is also advised to reach out with any questions or concerns.    2. Shoulder pain.  She was experiencing previous pain in the shoulder, but it seems to have subsided.    Orders:  No orders of the defined types were placed in this encounter.    No orders of the defined types were placed in this encounter.            Dragon dictation utilized          Patient or patient representative verbalized consent for the use of Ambient Listening during the visit with  KAM Washington for chart documentation. 2/20/2025  15:26 EST

## 2025-06-02 ENCOUNTER — OFFICE VISIT (OUTPATIENT)
Dept: ORTHOPEDIC SURGERY | Facility: CLINIC | Age: 75
End: 2025-06-02
Payer: MEDICARE

## 2025-06-02 VITALS — BODY MASS INDEX: 33.31 KG/M2 | WEIGHT: 188 LBS | HEIGHT: 63 IN

## 2025-06-02 DIAGNOSIS — M75.51 SUBACROMIAL BURSITIS OF RIGHT SHOULDER JOINT: ICD-10-CM

## 2025-06-02 DIAGNOSIS — M25.562 LEFT KNEE PAIN, UNSPECIFIED CHRONICITY: ICD-10-CM

## 2025-06-02 DIAGNOSIS — M17.12 PRIMARY OSTEOARTHRITIS OF LEFT KNEE: Primary | ICD-10-CM

## 2025-06-02 DIAGNOSIS — S89.92XA INJURY OF LEFT KNEE, INITIAL ENCOUNTER: ICD-10-CM

## 2025-06-02 RX ORDER — LIDOCAINE HYDROCHLORIDE 10 MG/ML
4 INJECTION, SOLUTION EPIDURAL; INFILTRATION; INTRACAUDAL; PERINEURAL
Status: COMPLETED | OUTPATIENT
Start: 2025-06-02 | End: 2025-06-02

## 2025-06-02 RX ORDER — TRIAMCINOLONE ACETONIDE 40 MG/ML
80 INJECTION, SUSPENSION INTRA-ARTICULAR; INTRAMUSCULAR
Status: COMPLETED | OUTPATIENT
Start: 2025-06-02 | End: 2025-06-02

## 2025-06-02 RX ORDER — LIDOCAINE HYDROCHLORIDE 10 MG/ML
8 INJECTION, SOLUTION EPIDURAL; INFILTRATION; INTRACAUDAL; PERINEURAL
Status: COMPLETED | OUTPATIENT
Start: 2025-06-02 | End: 2025-06-02

## 2025-06-02 RX ADMIN — LIDOCAINE HYDROCHLORIDE 4 ML: 10 INJECTION, SOLUTION EPIDURAL; INFILTRATION; INTRACAUDAL; PERINEURAL at 12:09

## 2025-06-02 RX ADMIN — LIDOCAINE HYDROCHLORIDE 8 ML: 10 INJECTION, SOLUTION EPIDURAL; INFILTRATION; INTRACAUDAL; PERINEURAL at 12:08

## 2025-06-02 RX ADMIN — TRIAMCINOLONE ACETONIDE 80 MG: 40 INJECTION, SUSPENSION INTRA-ARTICULAR; INTRAMUSCULAR at 12:09

## 2025-06-02 RX ADMIN — TRIAMCINOLONE ACETONIDE 80 MG: 40 INJECTION, SUSPENSION INTRA-ARTICULAR; INTRAMUSCULAR at 12:08

## 2025-06-02 NOTE — PROGRESS NOTES
Subjective:     Patient ID: Lisa Gavin is a 74 y.o. female.    Chief Complaint:  Follow-up DJD left knee  History of Present Illness  History of Present Illness  The patient is a 74-year-old female who presents to the clinic today for evaluation of her left knee. She fell a few months ago, striking the anterior aspect of the knee. Ever since, she has noted a palpable swelling along the medial aspect of the knee, which is very tender to touch and tender with any pressure applied. She has been unable to wear her brace secondary to the tenderness that she is exhibiting. She is exhibiting discomfort with extension and flexion of the knee. She is currently under care of cardiology and has been unable to proceed with any joint surgery until she can get clearance. She is experiencing pain with extension and flexion of the knee, ambulatory activities, and transition to activities. She is also exhibiting pain along the lateral aspect of the right shoulder, pain present at night when she is attempting to sleep. She has received injections in the past, which have done quite well. She reports no other concerns at present.       Social History     Occupational History   • Occupation:    Tobacco Use   • Smoking status: Never     Passive exposure: Never   • Smokeless tobacco: Never   Vaping Use   • Vaping status: Never Used   Substance and Sexual Activity   • Alcohol use: No   • Drug use: No   • Sexual activity: Defer      Past Medical History:   Diagnosis Date   • A-fib    • Arthritis    • Congestive heart failure (CHF)    • Depression    • Disease of thyroid gland    • Elevated cholesterol    • GERD (gastroesophageal reflux disease)    • Headache    • Hypertension    • Kidney disease    • Sleep apnea    • Type 2 diabetes mellitus      Past Surgical History:   Procedure Laterality Date   • CHOLECYSTECTOMY     • COLONOSCOPY N/A 04/29/2022    Procedure: COLONOSCOPY;  Surgeon: Parmjit Traore MD;   "Location: [facility] OR;  Service: Gastroenterology;  Laterality: N/A;  Diverticulosis   • CORONARY STENT PLACEMENT     • HYSTERECTOMY  1996    bilateral oophorectomy for heavy bleeding. No HRT   • ROTATOR CUFF REPAIR      ACUTE   • SHOULDER SURGERY Bilateral     hAS HAD ROTATOR CUFF SURGERY ON BOTH SHOULDERS       Family History   Problem Relation Age of Onset   • Diabetes Mother    • Kidney disease Father    • Arthritis Father    • Kidney cancer Father    • Prostate cancer Father    • Cancer Other    • Diabetes Other    • Glaucoma Other    • Rheum arthritis Other    • Kidney disease Other    • Breast cancer Neg Hx                Objective:  Physical Exam    General: No acute distress.  Eyes: conjunctiva clear; pupils equally round and reactive  ENT: external ears and nose atraumatic; oropharynx clear  CV: no peripheral edema  Resp: normal respiratory effort  Skin: no rashes or wounds; normal turgor  Psych: mood and affect appropriate; recent and remote memory intact    Vitals:    06/02/25 1122   Weight: 85.3 kg (188 lb)   Height: 160 cm (63\")         06/02/25  1122   Weight: 85.3 kg (188 lb)     Body mass index is 33.3 kg/m².      Ortho Exam     Physical Exam  Left knee examined  Palpable tenderness along with significant swelling along the soft tissue at the medial joint line  Knee range of motion with pain extension to 0 degrees flexion 120 degrees stable to varus and valgus stress at 0 degrees and 30 degrees  Palpable crepitus or arc of motion  Severe swelling, palpable swelling medial joint line  1+ anterior Lachman exam    Right shoulder exam  Palpable tenderness along the lateral acromion  Active forward flexion 165 degrees external rotation 50 degrees internal rotation to side  Positive deltoid firing  Positive sensation light touch all distributions right upper extremity  Imaging:  Left Knee X-Ray  Indication: Pain    AP, Lateral, and [place] views    Findings:  No fracture  No bony lesion  Normal soft " tissues  Advanced tricompartmental degeneration with bone-on-bone articulation along the medial compartment, significant swelling soft tissue adjacent to the medial tibial plateau no obvious evidence of any fracture     prior studies were available for comparison.    Assessment:        1. Primary osteoarthritis of left knee    2. Left knee pain, unspecified chronicity    3. Injury of left knee, initial encounter    4. Subacromial bursitis of right shoulder joint         Assessment & Plan      Plan:  Large Joint Arthrocentesis: L knee  Date/Time: 6/2/2025 12:08 PM  Consent given by: patient  Site marked: site marked  Timeout: Immediately prior to procedure a time out was called to verify the correct patient, procedure, equipment, support staff and site/side marked as required   Supporting Documentation  Indications: pain   Procedure Details  Location: knee - L knee  Preparation: Patient was prepped and draped in the usual sterile fashion  Needle size: 18 G  Approach: superior  Medications administered: 80 mg triamcinolone acetonide 40 MG/ML; 8 mL lidocaine PF 1% 1 %  Aspirate amount: 6 mL  Aspirate: serous  Patient tolerance: patient tolerated the procedure well with no immediate complications      Large Joint Arthrocentesis: R subacromial bursa  Date/Time: 6/2/2025 12:09 PM  Consent given by: patient  Site marked: site marked  Timeout: Immediately prior to procedure a time out was called to verify the correct patient, procedure, equipment, support staff and site/side marked as required   Supporting Documentation  Indications: pain   Procedure Details  Location: shoulder - R subacromial bursa  Preparation: Patient was prepped and draped in the usual sterile fashion  Needle size: 22 G  Approach: lateral  Medications administered: 80 mg triamcinolone acetonide 40 MG/ML; 4 mL lidocaine PF 1% 1 %  Patient tolerance: patient tolerated the procedure well with no immediate complications      Discussed plan of care with  patient and family.  We will proceed with CT left knee.  Will proceed with aspiration corticosteroid injection left knee corticosteroid injection right shoulder I do recommend ice injection site this evening we will plan to call her with results and further plan of care pending CT of the knee.  All question answered.  Orders:  Orders Placed This Encounter   Procedures   • Large Joint Arthrocentesis: L knee   • Large Joint Arthrocentesis: R subacromial bursa   • XR Knee 3+ View With [place] Left   • CT knee left wo contrast     No orders of the defined types were placed in this encounter.          Dragon dictation utilized          Patient or patient representative verbalized consent for the use of Ambient Listening during the visit with  KAM Washington for chart documentation. 6/2/2025  16:52 EDT

## 2025-06-17 ENCOUNTER — APPOINTMENT (OUTPATIENT)
Dept: GENERAL RADIOLOGY | Facility: HOSPITAL | Age: 75
End: 2025-06-17
Payer: MEDICARE

## 2025-06-17 ENCOUNTER — HOSPITAL ENCOUNTER (OUTPATIENT)
Facility: HOSPITAL | Age: 75
Setting detail: OBSERVATION
Discharge: HOME | End: 2025-06-18
Attending: STUDENT IN AN ORGANIZED HEALTH CARE EDUCATION/TRAINING PROGRAM | Admitting: HOSPITALIST
Payer: MEDICARE

## 2025-06-17 DIAGNOSIS — I49.8 BIGEMINY: Primary | ICD-10-CM

## 2025-06-17 DIAGNOSIS — R42 DIZZINESS AND GIDDINESS: ICD-10-CM

## 2025-06-17 LAB
ALBUMIN SERPL-MCNC: 3.8 G/DL (ref 3.5–5.2)
ALBUMIN/GLOB SERPL: 1.4 G/DL
ALP SERPL-CCNC: 52 U/L (ref 39–117)
ALT SERPL W P-5'-P-CCNC: 13 U/L (ref 1–33)
ANION GAP SERPL CALCULATED.3IONS-SCNC: 15 MMOL/L (ref 5–15)
AST SERPL-CCNC: 17 U/L (ref 1–32)
BASOPHILS # BLD AUTO: 0.03 10*3/MM3 (ref 0–0.2)
BASOPHILS NFR BLD AUTO: 0.4 % (ref 0–1.5)
BILIRUB SERPL-MCNC: 0.2 MG/DL (ref 0–1.2)
BUN SERPL-MCNC: 49.6 MG/DL (ref 8–23)
BUN/CREAT SERPL: 26.8 (ref 7–25)
CALCIUM SPEC-SCNC: 9.6 MG/DL (ref 8.6–10.5)
CHLORIDE SERPL-SCNC: 105 MMOL/L (ref 98–107)
CO2 SERPL-SCNC: 21 MMOL/L (ref 22–29)
CREAT SERPL-MCNC: 1.85 MG/DL (ref 0.57–1)
DEPRECATED RDW RBC AUTO: 52.6 FL (ref 37–54)
EGFRCR SERPLBLD CKD-EPI 2021: 28.5 ML/MIN/1.73
EOSINOPHIL # BLD AUTO: 0.2 10*3/MM3 (ref 0–0.4)
EOSINOPHIL NFR BLD AUTO: 3 % (ref 0.3–6.2)
ERYTHROCYTE [DISTWIDTH] IN BLOOD BY AUTOMATED COUNT: 14.7 % (ref 12.3–15.4)
GEN 5 1HR TROPONIN T REFLEX: 27 NG/L
GLOBULIN UR ELPH-MCNC: 2.7 GM/DL
GLUCOSE SERPL-MCNC: 96 MG/DL (ref 65–99)
HBA1C MFR BLD: 6.18 % (ref 4.8–5.6)
HCT VFR BLD AUTO: 29.5 % (ref 34–46.6)
HGB BLD-MCNC: 10 G/DL (ref 12–15.9)
IMM GRANULOCYTES # BLD AUTO: 0.03 10*3/MM3 (ref 0–0.05)
IMM GRANULOCYTES NFR BLD AUTO: 0.4 % (ref 0–0.5)
LYMPHOCYTES # BLD AUTO: 1.67 10*3/MM3 (ref 0.7–3.1)
LYMPHOCYTES NFR BLD AUTO: 24.8 % (ref 19.6–45.3)
MAGNESIUM SERPL-MCNC: 1.4 MG/DL (ref 1.6–2.4)
MCH RBC QN AUTO: 33.3 PG (ref 26.6–33)
MCHC RBC AUTO-ENTMCNC: 33.9 G/DL (ref 31.5–35.7)
MCV RBC AUTO: 98.3 FL (ref 79–97)
MONOCYTES # BLD AUTO: 0.52 10*3/MM3 (ref 0.1–0.9)
MONOCYTES NFR BLD AUTO: 7.7 % (ref 5–12)
NEUTROPHILS NFR BLD AUTO: 4.28 10*3/MM3 (ref 1.7–7)
NEUTROPHILS NFR BLD AUTO: 63.7 % (ref 42.7–76)
NRBC BLD AUTO-RTO: 0 /100 WBC (ref 0–0.2)
NT-PROBNP SERPL-MCNC: 1277 PG/ML (ref 0–900)
PHOSPHATE SERPL-MCNC: 4.4 MG/DL (ref 2.5–4.5)
PLATELET # BLD AUTO: 159 10*3/MM3 (ref 140–450)
PMV BLD AUTO: 11.3 FL (ref 6–12)
POTASSIUM SERPL-SCNC: 4.5 MMOL/L (ref 3.5–5.2)
PROT SERPL-MCNC: 6.5 G/DL (ref 6–8.5)
QT INTERVAL: 391 MS
QTC INTERVAL: 481 MS
RBC # BLD AUTO: 3 10*6/MM3 (ref 3.77–5.28)
SODIUM SERPL-SCNC: 141 MMOL/L (ref 136–145)
TROPONIN T % DELTA: 4
TROPONIN T NUMERIC DELTA: 1 NG/L
TROPONIN T SERPL HS-MCNC: 26 NG/L
TSH SERPL DL<=0.05 MIU/L-ACNC: 1.99 UIU/ML (ref 0.27–4.2)
WBC NRBC COR # BLD AUTO: 6.73 10*3/MM3 (ref 3.4–10.8)

## 2025-06-17 PROCEDURE — 84484 ASSAY OF TROPONIN QUANT: CPT | Performed by: PHYSICIAN ASSISTANT

## 2025-06-17 PROCEDURE — 25810000003 SODIUM CHLORIDE 0.9 % SOLUTION: Performed by: PHYSICIAN ASSISTANT

## 2025-06-17 PROCEDURE — 36415 COLL VENOUS BLD VENIPUNCTURE: CPT

## 2025-06-17 PROCEDURE — G0378 HOSPITAL OBSERVATION PER HR: HCPCS

## 2025-06-17 PROCEDURE — 84100 ASSAY OF PHOSPHORUS: CPT | Performed by: PHYSICIAN ASSISTANT

## 2025-06-17 PROCEDURE — 25010000002 MAGNESIUM SULFATE IN D5W 1G/100ML (PREMIX) 1-5 GM/100ML-% SOLUTION: Performed by: PHYSICIAN ASSISTANT

## 2025-06-17 PROCEDURE — 84443 ASSAY THYROID STIM HORMONE: CPT | Performed by: NURSE PRACTITIONER

## 2025-06-17 PROCEDURE — 83036 HEMOGLOBIN GLYCOSYLATED A1C: CPT | Performed by: NURSE PRACTITIONER

## 2025-06-17 PROCEDURE — 80053 COMPREHEN METABOLIC PANEL: CPT | Performed by: PHYSICIAN ASSISTANT

## 2025-06-17 PROCEDURE — 83880 ASSAY OF NATRIURETIC PEPTIDE: CPT | Performed by: PHYSICIAN ASSISTANT

## 2025-06-17 PROCEDURE — 93005 ELECTROCARDIOGRAM TRACING: CPT | Performed by: PHYSICIAN ASSISTANT

## 2025-06-17 PROCEDURE — 99223 1ST HOSP IP/OBS HIGH 75: CPT | Performed by: NURSE PRACTITIONER

## 2025-06-17 PROCEDURE — 25010000002 ENOXAPARIN PER 10 MG: Performed by: NURSE PRACTITIONER

## 2025-06-17 PROCEDURE — 96366 THER/PROPH/DIAG IV INF ADDON: CPT

## 2025-06-17 PROCEDURE — 85025 COMPLETE CBC W/AUTO DIFF WBC: CPT | Performed by: PHYSICIAN ASSISTANT

## 2025-06-17 PROCEDURE — 25010000002 ONDANSETRON PER 1 MG: Performed by: PHYSICIAN ASSISTANT

## 2025-06-17 PROCEDURE — 25010000002 MAGNESIUM SULFATE 2 GM/50ML SOLUTION: Performed by: PHYSICIAN ASSISTANT

## 2025-06-17 PROCEDURE — 96372 THER/PROPH/DIAG INJ SC/IM: CPT

## 2025-06-17 PROCEDURE — 83735 ASSAY OF MAGNESIUM: CPT | Performed by: PHYSICIAN ASSISTANT

## 2025-06-17 PROCEDURE — 93010 ELECTROCARDIOGRAM REPORT: CPT | Performed by: INTERNAL MEDICINE

## 2025-06-17 PROCEDURE — 96365 THER/PROPH/DIAG IV INF INIT: CPT

## 2025-06-17 PROCEDURE — 99285 EMERGENCY DEPT VISIT HI MDM: CPT | Performed by: STUDENT IN AN ORGANIZED HEALTH CARE EDUCATION/TRAINING PROGRAM

## 2025-06-17 PROCEDURE — 96375 TX/PRO/DX INJ NEW DRUG ADDON: CPT

## 2025-06-17 PROCEDURE — 71045 X-RAY EXAM CHEST 1 VIEW: CPT

## 2025-06-17 RX ORDER — NITROGLYCERIN 0.4 MG/1
0.4 TABLET SUBLINGUAL
Status: DISCONTINUED | OUTPATIENT
Start: 2025-06-17 | End: 2025-06-18 | Stop reason: HOSPADM

## 2025-06-17 RX ORDER — BISACODYL 10 MG
10 SUPPOSITORY, RECTAL RECTAL DAILY PRN
Status: DISCONTINUED | OUTPATIENT
Start: 2025-06-17 | End: 2025-06-18 | Stop reason: HOSPADM

## 2025-06-17 RX ORDER — DULOXETIN HYDROCHLORIDE 30 MG/1
30 CAPSULE, DELAYED RELEASE ORAL DAILY
Status: DISCONTINUED | OUTPATIENT
Start: 2025-06-18 | End: 2025-06-18 | Stop reason: HOSPADM

## 2025-06-17 RX ORDER — METHIMAZOLE 5 MG/1
5 TABLET ORAL DAILY
Status: DISCONTINUED | OUTPATIENT
Start: 2025-06-18 | End: 2025-06-18 | Stop reason: HOSPADM

## 2025-06-17 RX ORDER — SODIUM CHLORIDE 9 MG/ML
40 INJECTION, SOLUTION INTRAVENOUS AS NEEDED
Status: DISCONTINUED | OUTPATIENT
Start: 2025-06-17 | End: 2025-06-18 | Stop reason: HOSPADM

## 2025-06-17 RX ORDER — POLYETHYLENE GLYCOL 3350 17 G/17G
17 POWDER, FOR SOLUTION ORAL DAILY PRN
Status: DISCONTINUED | OUTPATIENT
Start: 2025-06-17 | End: 2025-06-18 | Stop reason: HOSPADM

## 2025-06-17 RX ORDER — CLOPIDOGREL BISULFATE 75 MG/1
75 TABLET ORAL DAILY
Status: DISCONTINUED | OUTPATIENT
Start: 2025-06-18 | End: 2025-06-18

## 2025-06-17 RX ORDER — GLIPIZIDE 5 MG/1
5 TABLET ORAL
Status: DISCONTINUED | OUTPATIENT
Start: 2025-06-18 | End: 2025-06-18 | Stop reason: HOSPADM

## 2025-06-17 RX ORDER — ACETAMINOPHEN 325 MG/1
650 TABLET ORAL EVERY 4 HOURS PRN
Status: DISCONTINUED | OUTPATIENT
Start: 2025-06-17 | End: 2025-06-18 | Stop reason: HOSPADM

## 2025-06-17 RX ORDER — METOPROLOL TARTRATE 25 MG/1
25 TABLET, FILM COATED ORAL ONCE
Status: COMPLETED | OUTPATIENT
Start: 2025-06-17 | End: 2025-06-17

## 2025-06-17 RX ORDER — ATORVASTATIN CALCIUM 20 MG/1
20 TABLET, FILM COATED ORAL NIGHTLY
Status: DISCONTINUED | OUTPATIENT
Start: 2025-06-17 | End: 2025-06-18 | Stop reason: HOSPADM

## 2025-06-17 RX ORDER — CALCIUM CARBONATE/VITAMIN D3 600 MG-10
1 TABLET ORAL 2 TIMES DAILY WITH MEALS
Status: DISCONTINUED | OUTPATIENT
Start: 2025-06-17 | End: 2025-06-18 | Stop reason: CLARIF

## 2025-06-17 RX ORDER — PANTOPRAZOLE SODIUM 40 MG/1
40 TABLET, DELAYED RELEASE ORAL
Status: DISCONTINUED | OUTPATIENT
Start: 2025-06-18 | End: 2025-06-18 | Stop reason: HOSPADM

## 2025-06-17 RX ORDER — ONDANSETRON 4 MG/1
4 TABLET, ORALLY DISINTEGRATING ORAL EVERY 6 HOURS PRN
Status: DISCONTINUED | OUTPATIENT
Start: 2025-06-17 | End: 2025-06-18 | Stop reason: HOSPADM

## 2025-06-17 RX ORDER — BISACODYL 5 MG/1
5 TABLET, DELAYED RELEASE ORAL DAILY PRN
Status: DISCONTINUED | OUTPATIENT
Start: 2025-06-17 | End: 2025-06-18 | Stop reason: HOSPADM

## 2025-06-17 RX ORDER — SODIUM CHLORIDE 0.9 % (FLUSH) 0.9 %
10 SYRINGE (ML) INJECTION AS NEEDED
Status: DISCONTINUED | OUTPATIENT
Start: 2025-06-17 | End: 2025-06-18 | Stop reason: HOSPADM

## 2025-06-17 RX ORDER — ONDANSETRON 2 MG/ML
4 INJECTION INTRAMUSCULAR; INTRAVENOUS EVERY 6 HOURS PRN
Status: DISCONTINUED | OUTPATIENT
Start: 2025-06-17 | End: 2025-06-18 | Stop reason: HOSPADM

## 2025-06-17 RX ORDER — ACETAMINOPHEN 650 MG/1
650 SUPPOSITORY RECTAL EVERY 4 HOURS PRN
Status: DISCONTINUED | OUTPATIENT
Start: 2025-06-17 | End: 2025-06-18 | Stop reason: HOSPADM

## 2025-06-17 RX ORDER — METOPROLOL TARTRATE 50 MG
50 TABLET ORAL EVERY 12 HOURS SCHEDULED
Status: DISCONTINUED | OUTPATIENT
Start: 2025-06-17 | End: 2025-06-18 | Stop reason: HOSPADM

## 2025-06-17 RX ORDER — FLECAINIDE ACETATE 100 MG/1
100 TABLET ORAL 2 TIMES DAILY
Status: DISCONTINUED | OUTPATIENT
Start: 2025-06-17 | End: 2025-06-18

## 2025-06-17 RX ORDER — DULOXETIN HYDROCHLORIDE 30 MG/1
30 CAPSULE, DELAYED RELEASE ORAL DAILY
COMMUNITY
Start: 2025-06-05 | End: 2025-10-03

## 2025-06-17 RX ORDER — ISOSORBIDE MONONITRATE 30 MG/1
30 TABLET, EXTENDED RELEASE ORAL DAILY
Status: DISCONTINUED | OUTPATIENT
Start: 2025-06-18 | End: 2025-06-18 | Stop reason: HOSPADM

## 2025-06-17 RX ORDER — ACETAMINOPHEN 160 MG/5ML
650 SOLUTION ORAL EVERY 4 HOURS PRN
Status: DISCONTINUED | OUTPATIENT
Start: 2025-06-17 | End: 2025-06-18 | Stop reason: HOSPADM

## 2025-06-17 RX ORDER — MAGNESIUM SULFATE 1 G/100ML
1 INJECTION INTRAVENOUS ONCE
Status: COMPLETED | OUTPATIENT
Start: 2025-06-17 | End: 2025-06-17

## 2025-06-17 RX ORDER — SODIUM CHLORIDE 0.9 % (FLUSH) 0.9 %
10 SYRINGE (ML) INJECTION EVERY 12 HOURS SCHEDULED
Status: DISCONTINUED | OUTPATIENT
Start: 2025-06-17 | End: 2025-06-18 | Stop reason: HOSPADM

## 2025-06-17 RX ORDER — FUROSEMIDE 40 MG/1
40 TABLET ORAL 2 TIMES DAILY
Status: DISCONTINUED | OUTPATIENT
Start: 2025-06-17 | End: 2025-06-18 | Stop reason: HOSPADM

## 2025-06-17 RX ORDER — ONDANSETRON 2 MG/ML
4 INJECTION INTRAMUSCULAR; INTRAVENOUS ONCE
Status: COMPLETED | OUTPATIENT
Start: 2025-06-17 | End: 2025-06-17

## 2025-06-17 RX ORDER — MAGNESIUM SULFATE HEPTAHYDRATE 40 MG/ML
2 INJECTION, SOLUTION INTRAVENOUS ONCE
Status: COMPLETED | OUTPATIENT
Start: 2025-06-17 | End: 2025-06-17

## 2025-06-17 RX ORDER — AMOXICILLIN 250 MG
2 CAPSULE ORAL 2 TIMES DAILY PRN
Status: DISCONTINUED | OUTPATIENT
Start: 2025-06-17 | End: 2025-06-18 | Stop reason: HOSPADM

## 2025-06-17 RX ORDER — ENOXAPARIN SODIUM 100 MG/ML
30 INJECTION SUBCUTANEOUS EVERY 24 HOURS
Status: DISCONTINUED | OUTPATIENT
Start: 2025-06-17 | End: 2025-06-18

## 2025-06-17 RX ORDER — LOSARTAN POTASSIUM 50 MG/1
50 TABLET ORAL DAILY
Status: DISCONTINUED | OUTPATIENT
Start: 2025-06-18 | End: 2025-06-18 | Stop reason: HOSPADM

## 2025-06-17 RX ORDER — ALLOPURINOL 100 MG/1
100 TABLET ORAL DAILY
Status: DISCONTINUED | OUTPATIENT
Start: 2025-06-18 | End: 2025-06-18 | Stop reason: HOSPADM

## 2025-06-17 RX ADMIN — ATORVASTATIN CALCIUM 20 MG: 20 TABLET, FILM COATED ORAL at 23:26

## 2025-06-17 RX ADMIN — METFORMIN HYDROCHLORIDE 500 MG: 500 TABLET, FILM COATED ORAL at 23:26

## 2025-06-17 RX ADMIN — MAGNESIUM SULFATE HEPTAHYDRATE 1 G: 10 INJECTION, SOLUTION INTRAVENOUS at 22:21

## 2025-06-17 RX ADMIN — METOPROLOL TARTRATE 50 MG: 50 TABLET, FILM COATED ORAL at 23:26

## 2025-06-17 RX ADMIN — FLECAINIDE ACETATE 100 MG: 100 TABLET ORAL at 23:26

## 2025-06-17 RX ADMIN — ENOXAPARIN SODIUM 30 MG: 100 INJECTION SUBCUTANEOUS at 22:15

## 2025-06-17 RX ADMIN — SODIUM CHLORIDE 500 ML: 9 INJECTION, SOLUTION INTRAVENOUS at 19:40

## 2025-06-17 RX ADMIN — METOPROLOL TARTRATE 25 MG: 25 TABLET, FILM COATED ORAL at 21:02

## 2025-06-17 RX ADMIN — MAGNESIUM SULFATE HEPTAHYDRATE 2 G: 40 INJECTION, SOLUTION INTRAVENOUS at 19:40

## 2025-06-17 RX ADMIN — ONDANSETRON 4 MG: 2 INJECTION INTRAMUSCULAR; INTRAVENOUS at 19:39

## 2025-06-17 RX ADMIN — Medication 10 ML: at 22:22

## 2025-06-17 RX ADMIN — SODIUM CHLORIDE 500 ML: 9 INJECTION, SOLUTION INTRAVENOUS at 20:00

## 2025-06-17 NOTE — ED PROVIDER NOTES
Subjective   History of Present Illness  Patient is a 74-year-old female who presents emergency room saying that she has had some episodes of nausea and lightheadedness for the last few days.  She supposed to check her blood pressure on Monday to send to her doctor and she forgot yesterday so she did it today.  So the blood pressure cuff said her heart rate was 38.  She checked it twice and it was still send it was 38.  She then used a finger pulse ox and it said her heart rate was 38.  She says that she  vomited 1 time yesterday.  Otherwise has not been sick other than this nausea which she has episodes of.    She says she used to have a cardiologist at [place] who either retired or quit.  She was supposed to be following Dr. Campos at [place] but she says she is never actually seen him.    She has a history of irregular heartbeats including A-fib and bigeminy.  She says she is supposed to be on Xarelto but quit it about 6 months ago because she could not afford to pay $600 a month for it.  She says she does take Plavix and a baby aspirin.    She has bad knees and frequently falls.  She has not injured herself in these falls lately.      Review of Systems   Constitutional: Negative.    HENT: Negative.     Cardiovascular:  Negative for chest pain, palpitations and leg swelling.   Gastrointestinal:  Positive for nausea and vomiting (Once yesterday). Negative for abdominal pain and diarrhea.   Genitourinary: Negative.    Musculoskeletal: Negative.    Skin: Negative.    Neurological:  Positive for light-headedness.   Psychiatric/Behavioral: Negative.     All other systems reviewed and are negative.      Past Medical History:   Diagnosis Date    A-fib     Arthritis     Congestive heart failure (CHF)     Depression     Disease of thyroid gland     Elevated cholesterol     GERD (gastroesophageal reflux disease)     Headache     Hypertension     Kidney disease     Sleep apnea     Type 2 diabetes mellitus        Allergies    Allergen Reactions    Nsaids Other (See Comments)     CKD        Past Surgical History:   Procedure Laterality Date    CHOLECYSTECTOMY      COLONOSCOPY N/A 04/29/2022    Procedure: COLONOSCOPY;  Surgeon: Parmjit Traore MD;  Location: [facility];  Service: Gastroenterology;  Laterality: N/A;  Diverticulosis    CORONARY STENT PLACEMENT      HYSTERECTOMY  1996    bilateral oophorectomy for heavy bleeding. No HRT    ROTATOR CUFF REPAIR      ACUTE    SHOULDER SURGERY Bilateral     hAS HAD ROTATOR CUFF SURGERY ON BOTH SHOULDERS       Family History   Problem Relation Age of Onset    Diabetes Mother     Kidney disease Father     Arthritis Father     Kidney cancer Father     Prostate cancer Father     Cancer Other     Diabetes Other     Glaucoma Other     Rheum arthritis Other     Kidney disease Other     Breast cancer Neg Hx        Social History     Socioeconomic History    Marital status:    Tobacco Use    Smoking status: Never     Passive exposure: Never    Smokeless tobacco: Never   Vaping Use    Vaping status: Never Used   Substance and Sexual Activity    Alcohol use: No    Drug use: No    Sexual activity: Defer           Objective   Physical Exam  Vitals and nursing note reviewed.   Constitutional:       Appearance: Normal appearance.   Eyes:      Conjunctiva/sclera: Conjunctivae normal.   Cardiovascular:      Rate and Rhythm: Rhythm irregular.      Heart sounds: Normal heart sounds.      Comments: Strong pulse on first beat, not palpable pulse on the ectopic beat  Pulmonary:      Effort: Pulmonary effort is normal.      Breath sounds: Normal breath sounds.   Abdominal:      General: Bowel sounds are normal. There is no distension.      Palpations: Abdomen is soft.      Tenderness: There is no abdominal tenderness. There is no right CVA tenderness, left CVA tenderness or guarding.   Musculoskeletal:         General: No swelling, tenderness or deformity. Normal range of motion.      Cervical back:  Normal range of motion and neck supple.      Right lower leg: No edema.      Left lower leg: No edema.   Skin:     General: Skin is warm and dry.   Neurological:      Mental Status: She is alert.      Gait: Gait normal.   Psychiatric:         Mood and Affect: Mood normal.         Behavior: Behavior normal.         ECG 12 Lead Other; bigelizabethini      Date/Time: 6/17/2025 6:18 PM    Performed by: Lindsey Sibley PA-C  Authorized by: Lindsey Sibley PA-C  Ectopy: bigeminy  Rate: normal  BPM: 91  QRS axis: normal  Clinical impression: abnormal ECG               ED Course                                                       Medical Decision Making  Present patient is well-appearing and nontoxic.  Her blood pressure is normal.  She goes in and out of [facility].  I felt pulse on her left wrist while she is in [facility] and I do appreciate a good strong pulse during her first heartbeat but do not appreciate a poles during the PVC of the paired complex.  She says she has been having episodes of nausea and lightheadedness.  She has vomited 1 time yesterday.  She denies any injury.  Says she frequently falls because her knees give out but she has not been hurt.  Otherwise she has not been sick.  She has had 1 Pepsi today and has not had any water.  Says she tries to drink 32 ounces of water every day but does not like water although normally she says she does drink it.    She is wanting a new cardiologist that she sees a group at [facility] and they have not been returning her calls.    She supposed to be on Xarelto and she stopped taking it around [place] because her insurance did not cover it.  She says she does take the Plavix and the aspirin she is prescribed.    I was able to speak with Dr. Reynolds and he says patient should be ops here at [facility].  He we had given her half a liter of normal saline and 2 g of magnesium.  He says to increase that to a full liter and to a total of 3 g of magnesium.  He wants us to give her  metoprolol to tartrate.  He also says to have her eat anything she wants to not including salty food and low reevaluate her tomorrow.  He wants us to hold Lasix and losartan.    I have spoken with Helena Machado and she accepts patient on behalf of Dr. Rosado.  Patient is in agreement with the admission.  Her blood pressure has been stable here she has not had runs of V. tach of my knowledge.  She will go from bigeminy to normal sinus rhythm.  At this time she is medically cleared for admission to observation unit.    --------------------            06/17/25               1906     --------------------   BP:       127/73     Pulse:      71       Resp:                Temp:                SpO2:      96%      --------------------    Labs Reviewed  COMPREHENSIVE METABOLIC PANEL - Abnormal; Notable for the following components:     BUN                           49.6 (*)               Creatinine                    1.85 (*)               CO2                           21.0 (*)               BUN/Creatinine Ratio          26.8 (*)               eGFR                          28.5 (*)            All other components within normal limits         Narrative: GFR Categories in Chronic Kidney Disease (CKD)                                              GFR Category          GFR (mL/min/1.73)    Interpretation                  G1                    90 or greater        Normal or high (1)                  G2                    60-89                Mild decrease (1)                  G3a                   45-59                Mild to moderate decrease                  G3b                   30-44                Moderate to severe decrease                  G4                    15-29                Severe decrease                  G5                    14 or less           Kidney failure                                    (1)In the absence of evidence of kidney disease, neither GFR category G1 or G2 fulfill the  criteria for CKD.                                    eGFR calculation 2021 CKD-EPI creatinine equation, which does not include race as a factor  MAGNESIUM - Abnormal; Notable for the following components:     Magnesium                     1.4 (*)             All other components within normal limits  BNP (IN-HOUSE) - Abnormal; Notable for the following components:     proBNP                        1,277.0 (*)            All other components within normal limits         Narrative: This assay is used as an aid in the diagnosis of individuals suspected of having heart failure. It can be used as an aid in the diagnosis of acute decompensated heart failure (ADHF) in patients presenting with signs and symptoms of ADHF to the emergency department (ED). In addition, NT-proBNP of <300 pg/mL indicates ADHF is not likely.                                    Age Range Result Interpretation  NT-proBNP Concentration (pg/mL:                                                      <50             Positive            >450                                   Gray                 300-450                                    Negative             <300                                    50-75           Positive            >900                                  Wood                300-900                                  Negative            <300                                                      >75             Positive            >1800                                  Wood                300-1800                                  Negative            <300  TROPONIN - Abnormal; Notable for the following components:     HS Troponin T                 26 (*)              All other components within normal limits         Narrative: High Sensitive Troponin T Reference Range:                  <14.0 ng/L- Negative Female for AMI                  <22.0 ng/L- Negative Male for AMI                  >=14 - Abnormal Female indicating possible myocardial  injury.                  >=22 - Abnormal Male indicating possible myocardial injury.                   Clinicians would have to utilize clinical acumen, EKG, Troponin, and serial changes to determine if it is an Acute Myocardial Infarction or myocardial injury due to an underlying chronic condition.                                       CBC WITH AUTO DIFFERENTIAL - Abnormal; Notable for the following components:     RBC                           3.00 (*)               Hemoglobin                    10.0 (*)               Hematocrit                    29.5 (*)               MCV                           98.3 (*)               MCH                           33.3 (*)            All other components within normal limits  PHOSPHORUS - Normal  HIGH SENSITIVITIY TROPONIN T 1HR  CBC AND DIFFERENTIAL    XR Chest 1 View  Result Date: 6/17/2025  Impression: No radiographic evidence of acute cardiopulmonary abnormality. Electronically Signed: Geovanny Ochoa MD  6/17/2025 8:20 PM EDT  Workstation ID: [id number]        Problems Addressed:  Bigeminy: complicated acute illness or injury    Amount and/or Complexity of Data Reviewed  Labs: ordered.  Radiology: ordered.  ECG/medicine tests: ordered and independent interpretation performed.    Risk  Prescription drug management.  Decision regarding hospitalization.        Final diagnoses:   Bigeminy       ED Disposition  ED Disposition       ED Disposition   Decision to Admit    Condition   --    Comment   Level of Care: Telemetry [5]   Diagnosis: Bigeminy [[id number]]   Admitting Physician: CHERELLE CONRAD [[id number]]   Attending Physician: CHERELLE CONRAD [[id number]]                 No follow-up provider specified.       Medication List        Changed      glyBURIDE-metFORMIN 5-500 MG per tablet  Commonly known as: GLUCOVANCE  What changed: how much to take            Stop      acetaminophen 500 MG tablet  Commonly known as: TYLENOL     dicyclomine 20 MG tablet  Commonly known as: BENTYL      doxycycline 100 MG capsule  Commonly known as: VIBRAMYCIN     Mirabegron ER 50 MG tablet sustained-release 24 hour 24 hr tablet  Commonly known as: MYRBETRIQ     rivaroxaban 20 MG tablet  Commonly known as: XARELTO     vitamin B-12 1000 MCG tablet  Commonly known as: CYANOCOBALAMIN     Vitamin E 100 units tablet     vitamin E 400 UNIT capsule     Xarelto 15 MG tablet  Generic drug: rivaroxaban                 Lindsey Sibley PA-C  06/17/25 2027       Lindsey Sibley PA-C  06/17/25 2030

## 2025-06-18 ENCOUNTER — APPOINTMENT (OUTPATIENT)
Dept: CARDIOLOGY | Facility: HOSPITAL | Age: 75
End: 2025-06-18
Payer: MEDICARE

## 2025-06-18 VITALS
TEMPERATURE: 97.5 F | OXYGEN SATURATION: 95 % | HEART RATE: 64 BPM | DIASTOLIC BLOOD PRESSURE: 58 MMHG | RESPIRATION RATE: 18 BRPM | BODY MASS INDEX: 31.64 KG/M2 | SYSTOLIC BLOOD PRESSURE: 122 MMHG | WEIGHT: 178.57 LBS | HEIGHT: 63 IN

## 2025-06-18 LAB
ANION GAP SERPL CALCULATED.3IONS-SCNC: 11.3 MMOL/L (ref 5–15)
AORTIC DIMENSIONLESS INDEX: 0.71 (DI)
ASCENDING AORTA: 2.9 CM
AV MEAN PRESS GRAD SYS DOP V1V2: 4 MMHG
AV VMAX SYS DOP: 130 CM/SEC
BASOPHILS # BLD AUTO: 0.04 10*3/MM3 (ref 0–0.2)
BASOPHILS NFR BLD AUTO: 0.8 % (ref 0–1.5)
BH CV ECHO MEAS - ACS: 1.91 CM
BH CV ECHO MEAS - AO MAX PG: 6.8 MMHG
BH CV ECHO MEAS - AO ROOT DIAM: 2.8 CM
BH CV ECHO MEAS - AO V2 VTI: 30.8 CM
BH CV ECHO MEAS - AVA(I,D): 2.5 CM2
BH CV ECHO MEAS - EDV(CUBED): 166.4 ML
BH CV ECHO MEAS - EDV(MOD-SP2): 140 ML
BH CV ECHO MEAS - EDV(MOD-SP4): 153 ML
BH CV ECHO MEAS - EF(MOD-SP2): 63.6 %
BH CV ECHO MEAS - EF(MOD-SP4): 54.2 %
BH CV ECHO MEAS - ESV(CUBED): 67 ML
BH CV ECHO MEAS - ESV(MOD-SP2): 51 ML
BH CV ECHO MEAS - ESV(MOD-SP4): 70 ML
BH CV ECHO MEAS - FS: 26.1 %
BH CV ECHO MEAS - IVS/LVPW: 1 CM
BH CV ECHO MEAS - IVSD: 1 CM
BH CV ECHO MEAS - LAT PEAK E' VEL: 10.1 CM/SEC
BH CV ECHO MEAS - LV DIASTOLIC VOL/BSA (35-75): 83 CM2
BH CV ECHO MEAS - LV MASS(C)D: 213.2 GRAMS
BH CV ECHO MEAS - LV MAX PG: 3.4 MMHG
BH CV ECHO MEAS - LV MEAN PG: 2 MMHG
BH CV ECHO MEAS - LV SYSTOLIC VOL/BSA (12-30): 38 CM2
BH CV ECHO MEAS - LV V1 MAX: 92.2 CM/SEC
BH CV ECHO MEAS - LV V1 VTI: 22 CM
BH CV ECHO MEAS - LVIDD: 5.5 CM
BH CV ECHO MEAS - LVIDS: 4.1 CM
BH CV ECHO MEAS - LVOT AREA: 3.6 CM2
BH CV ECHO MEAS - LVOT DIAM: 2.13 CM
BH CV ECHO MEAS - LVPWD: 1 CM
BH CV ECHO MEAS - MED PEAK E' VEL: 7.6 CM/SEC
BH CV ECHO MEAS - MV A MAX VEL: 72.8 CM/SEC
BH CV ECHO MEAS - MV DEC SLOPE: 482 CM/SEC2
BH CV ECHO MEAS - MV DEC TIME: 0.16 SEC
BH CV ECHO MEAS - MV E MAX VEL: 103 CM/SEC
BH CV ECHO MEAS - MV E/A: 1.41
BH CV ECHO MEAS - MV MAX PG: 5.7 MMHG
BH CV ECHO MEAS - MV MEAN PG: 1.77 MMHG
BH CV ECHO MEAS - MV P1/2T: 74.1 MSEC
BH CV ECHO MEAS - MV V2 VTI: 33.4 CM
BH CV ECHO MEAS - MVA(P1/2T): 3 CM2
BH CV ECHO MEAS - MVA(VTI): 2.34 CM2
BH CV ECHO MEAS - PA ACC TIME: 0.16 SEC
BH CV ECHO MEAS - PA V2 MAX: 89.2 CM/SEC
BH CV ECHO MEAS - RAP SYSTOLE: 3 MMHG
BH CV ECHO MEAS - RV MAX PG: 2.09 MMHG
BH CV ECHO MEAS - RV V1 MAX: 72.3 CM/SEC
BH CV ECHO MEAS - RV V1 VTI: 21 CM
BH CV ECHO MEAS - RVSP: 37 MMHG
BH CV ECHO MEAS - SV(LVOT): 78.2 ML
BH CV ECHO MEAS - SV(MOD-SP2): 89 ML
BH CV ECHO MEAS - SV(MOD-SP4): 83 ML
BH CV ECHO MEAS - SVI(LVOT): 42.4 ML/M2
BH CV ECHO MEAS - SVI(MOD-SP2): 48.3 ML/M2
BH CV ECHO MEAS - SVI(MOD-SP4): 45 ML/M2
BH CV ECHO MEAS - TAPSE (>1.6): 2.8 CM
BH CV ECHO MEAS - TR MAX PG: 34.8 MMHG
BH CV ECHO MEAS - TR MAX VEL: 295.2 CM/SEC
BH CV ECHO MEASUREMENTS AVERAGE E/E' RATIO: 11.64
BH CV XLRA - RV BASE: 3.9 CM
BH CV XLRA - RV LENGTH: 8.2 CM
BH CV XLRA - RV MID: 2.7 CM
BH CV XLRA - TDI S': 15.2 CM/SEC
BUN SERPL-MCNC: 42.5 MG/DL (ref 8–23)
BUN/CREAT SERPL: 28.5 (ref 7–25)
CALCIUM SPEC-SCNC: 9 MG/DL (ref 8.6–10.5)
CHLORIDE SERPL-SCNC: 109 MMOL/L (ref 98–107)
CO2 SERPL-SCNC: 22.7 MMOL/L (ref 22–29)
CREAT SERPL-MCNC: 1.49 MG/DL (ref 0.57–1)
DEPRECATED RDW RBC AUTO: 53.9 FL (ref 37–54)
EGFRCR SERPLBLD CKD-EPI 2021: 36.9 ML/MIN/1.73
EOSINOPHIL # BLD AUTO: 0.17 10*3/MM3 (ref 0–0.4)
EOSINOPHIL NFR BLD AUTO: 3.2 % (ref 0.3–6.2)
ERYTHROCYTE [DISTWIDTH] IN BLOOD BY AUTOMATED COUNT: 14.9 % (ref 12.3–15.4)
GLUCOSE SERPL-MCNC: 162 MG/DL (ref 65–99)
HCT VFR BLD AUTO: 28.9 % (ref 34–46.6)
HGB BLD-MCNC: 9.7 G/DL (ref 12–15.9)
IMM GRANULOCYTES # BLD AUTO: 0.01 10*3/MM3 (ref 0–0.05)
IMM GRANULOCYTES NFR BLD AUTO: 0.2 % (ref 0–0.5)
LEFT ATRIUM VOLUME INDEX: 46 ML/M2
LV EF BIPLANE MOD: 58.2 %
LYMPHOCYTES # BLD AUTO: 1.48 10*3/MM3 (ref 0.7–3.1)
LYMPHOCYTES NFR BLD AUTO: 27.8 % (ref 19.6–45.3)
MAGNESIUM SERPL-MCNC: 2.1 MG/DL (ref 1.6–2.4)
MCH RBC QN AUTO: 33.1 PG (ref 26.6–33)
MCHC RBC AUTO-ENTMCNC: 33.6 G/DL (ref 31.5–35.7)
MCV RBC AUTO: 98.6 FL (ref 79–97)
MONOCYTES # BLD AUTO: 0.45 10*3/MM3 (ref 0.1–0.9)
MONOCYTES NFR BLD AUTO: 8.4 % (ref 5–12)
NEUTROPHILS NFR BLD AUTO: 3.18 10*3/MM3 (ref 1.7–7)
NEUTROPHILS NFR BLD AUTO: 59.6 % (ref 42.7–76)
NRBC BLD AUTO-RTO: 0 /100 WBC (ref 0–0.2)
PLATELET # BLD AUTO: 145 10*3/MM3 (ref 140–450)
PMV BLD AUTO: 11.5 FL (ref 6–12)
POTASSIUM SERPL-SCNC: 4.5 MMOL/L (ref 3.5–5.2)
QT INTERVAL: 428 MS
QTC INTERVAL: 452 MS
RBC # BLD AUTO: 2.93 10*6/MM3 (ref 3.77–5.28)
SINUS: 2.6 CM
SODIUM SERPL-SCNC: 143 MMOL/L (ref 136–145)
STJ: 2.42 CM
TROPONIN T SERPL HS-MCNC: 23 NG/L
WBC NRBC COR # BLD AUTO: 5.33 10*3/MM3 (ref 3.4–10.8)

## 2025-06-18 PROCEDURE — 99204 OFFICE O/P NEW MOD 45 MIN: CPT | Performed by: STUDENT IN AN ORGANIZED HEALTH CARE EDUCATION/TRAINING PROGRAM

## 2025-06-18 PROCEDURE — 93010 ELECTROCARDIOGRAM REPORT: CPT | Performed by: INTERNAL MEDICINE

## 2025-06-18 PROCEDURE — G0378 HOSPITAL OBSERVATION PER HR: HCPCS

## 2025-06-18 PROCEDURE — 25510000001 PERFLUTREN 6.52 MG/ML SUSPENSION 2 ML VIAL: Performed by: HOSPITALIST

## 2025-06-18 PROCEDURE — 83735 ASSAY OF MAGNESIUM: CPT | Performed by: NURSE PRACTITIONER

## 2025-06-18 PROCEDURE — 80048 BASIC METABOLIC PNL TOTAL CA: CPT | Performed by: NURSE PRACTITIONER

## 2025-06-18 PROCEDURE — 93306 TTE W/DOPPLER COMPLETE: CPT

## 2025-06-18 PROCEDURE — 25010000002 ONDANSETRON PER 1 MG: Performed by: NURSE PRACTITIONER

## 2025-06-18 PROCEDURE — 93306 TTE W/DOPPLER COMPLETE: CPT | Performed by: INTERNAL MEDICINE

## 2025-06-18 PROCEDURE — 99238 HOSP IP/OBS DSCHRG MGMT 30/<: CPT | Performed by: HOSPITALIST

## 2025-06-18 PROCEDURE — 85025 COMPLETE CBC W/AUTO DIFF WBC: CPT | Performed by: NURSE PRACTITIONER

## 2025-06-18 PROCEDURE — 93246 EXT ECG>7D<15D RECORDING: CPT

## 2025-06-18 PROCEDURE — 84484 ASSAY OF TROPONIN QUANT: CPT | Performed by: NURSE PRACTITIONER

## 2025-06-18 PROCEDURE — 96376 TX/PRO/DX INJ SAME DRUG ADON: CPT

## 2025-06-18 RX ORDER — ASPIRIN 81 MG/1
81 TABLET ORAL DAILY
Qty: 30 TABLET | Refills: 0 | Status: SHIPPED | OUTPATIENT
Start: 2025-06-19 | End: 2025-07-19

## 2025-06-18 RX ORDER — ASPIRIN 81 MG/1
81 TABLET ORAL DAILY
Status: DISCONTINUED | OUTPATIENT
Start: 2025-06-18 | End: 2025-06-18 | Stop reason: HOSPADM

## 2025-06-18 RX ORDER — GINGER ROOT/GINGER ROOT EXT 262.5 MG
1 CAPSULE ORAL 2 TIMES DAILY WITH MEALS
Status: DISCONTINUED | OUTPATIENT
Start: 2025-06-18 | End: 2025-06-18

## 2025-06-18 RX ORDER — CALCIUM CARBONATE/VITAMIN D3 600 MG-10
1 TABLET ORAL 2 TIMES DAILY WITH MEALS
Status: DISCONTINUED | OUTPATIENT
Start: 2025-06-18 | End: 2025-06-18 | Stop reason: HOSPADM

## 2025-06-18 RX ADMIN — FLECAINIDE ACETATE 100 MG: 100 TABLET ORAL at 08:09

## 2025-06-18 RX ADMIN — SODIUM CHLORIDE 2 ML: 9 INJECTION INTRAMUSCULAR; INTRAVENOUS; SUBCUTANEOUS at 10:22

## 2025-06-18 RX ADMIN — METOPROLOL TARTRATE 50 MG: 50 TABLET, FILM COATED ORAL at 08:10

## 2025-06-18 RX ADMIN — METHIMAZOLE 5 MG: 5 TABLET ORAL at 08:10

## 2025-06-18 RX ADMIN — ALLOPURINOL 100 MG: 100 TABLET ORAL at 08:09

## 2025-06-18 RX ADMIN — Medication 10 ML: at 08:11

## 2025-06-18 RX ADMIN — ASPIRIN 81 MG: 81 TABLET, COATED ORAL at 09:40

## 2025-06-18 RX ADMIN — CALCIUM CARBONATE 600 MG (1,500 MG)-VITAMIN D3 400 UNIT TABLET 1 TABLET: at 09:40

## 2025-06-18 RX ADMIN — METFORMIN HYDROCHLORIDE 500 MG: 500 TABLET, FILM COATED ORAL at 08:10

## 2025-06-18 RX ADMIN — PANTOPRAZOLE SODIUM 40 MG: 40 TABLET, DELAYED RELEASE ORAL at 06:38

## 2025-06-18 RX ADMIN — CLOPIDOGREL BISULFATE 75 MG: 75 TABLET, FILM COATED ORAL at 08:10

## 2025-06-18 RX ADMIN — GLIPIZIDE 5 MG: 5 TABLET ORAL at 06:38

## 2025-06-18 RX ADMIN — APIXABAN 5 MG: 2.5 TABLET, FILM COATED ORAL at 09:40

## 2025-06-18 RX ADMIN — ONDANSETRON 4 MG: 2 INJECTION INTRAMUSCULAR; INTRAVENOUS at 12:11

## 2025-06-18 RX ADMIN — ISOSORBIDE MONONITRATE 30 MG: 30 TABLET, EXTENDED RELEASE ORAL at 08:09

## 2025-06-18 NOTE — PROGRESS NOTES
"[facility] Clinical Pharmacy Services: Enoxaparin Consult    Lisa Gavin has a pharmacy consult to dose prophylactic enoxaparin per Ayanna's request.     Indication: VTE Prophylaxis  Home Anticoagulation: xarelto, plavix     Relevant clinical data and objective history reviewed:  74 y.o. female 160 cm (63\") 81.6 kg (179 lb 12.8 oz)   Body mass index is 31.85 kg/m².   Results from last 7 days   Lab Units 06/17/25  1846   PLATELETS 10*3/mm3 159     Estimated Creatinine Clearance: 27.4 mL/min (A) (by C-G formula based on SCr of 1.85 mg/dL (H)).    Assessment/Plan    Will start patient on 30mg subcutaneous every 24 hours, adjusted for renal function. Consult order will be discontinued but pharmacy will continue to follow.     Reba Rowland, PharmD  Clinical Pharmacist    "

## 2025-06-18 NOTE — H&P
[facility] HOSPITALIST     Edith Chávez PA    CHIEF COMPLAINT: nausea/dizziness/palpitations    HISTORY OF PRESENT ILLNESS:  The patient is a 74-year-old female who presented to the emergency department secondary to 2 to 3 days of nausea with some dizziness that began yesterday.  She does not associate dizziness with any position specifically, symptoms can occur while laying standing or walking.  She has had a light headache it is not present now.  She denies palpitations, chest pain, shortness of air, syncopal sensation.  *Notably she did start a new antidepressant 2 days ago that correlates with onset of the symptoms.    Significant findings in ER include HS troponin 26> 27, creatinine 1.85, magnesium 1.4, proBNP 1277.0, hemoglobin 10.0  EKG showed bigeminy with some couplets PVCs    KAE Lama contacted Dr. Nugent with cardiology who requested patient be given magnesium 3 g, 1 L IV fluids, hold Lasix and losartan, give dose of metoprolol and check D-dimer. He also said she could have a full regular diet. D-dimer was negative.    At time of my exam she has no symptoms whatsoever.  She does note that she never has an appetite but has been eating her usual amount without nausea or vomiting.  She also notes cough and a mild sore throat over the past 4 days but no fever, chills or other respiratory concerns.    The patient has a history of uncontrolled DM2, CHF, PAF, hypertension, hyperthyroidism/thyroid nodule, anxiety, dysphagia, IBS-D, gastroparesis, GERD, CKD 3, anemia, pulmonary hypertension, KRISTIN    She otherwise currently denies f/c/headache/rhinorrhea/nasal congestion/syncopal sensation/cough/soa/n/v/d/chest pain/abdominal pain/recent illness/sick exposures/change in bowel or bladder habits/no weight change/bloody emesis or bloody stools/change in medications or any other new concerns.    Past Medical History:   Diagnosis Date    A-fib     Arthritis     Congestive heart failure  (CHF)     Depression     Disease of thyroid gland     Elevated cholesterol     GERD (gastroesophageal reflux disease)     Headache     Hypertension     Kidney disease     Sleep apnea     Type 2 diabetes mellitus      Past Surgical History:   Procedure Laterality Date    CHOLECYSTECTOMY      COLONOSCOPY N/A 04/29/2022    Procedure: COLONOSCOPY;  Surgeon: Parmjit Traore MD;  Location: [facility];  Service: Gastroenterology;  Laterality: N/A;  Diverticulosis    CORONARY STENT PLACEMENT      HYSTERECTOMY  1996    bilateral oophorectomy for heavy bleeding. No HRT    ROTATOR CUFF REPAIR      ACUTE    SHOULDER SURGERY Bilateral     hAS HAD ROTATOR CUFF SURGERY ON BOTH SHOULDERS     Family History   Problem Relation Age of Onset    Diabetes Mother     Kidney disease Father     Arthritis Father     Kidney cancer Father     Prostate cancer Father     Cancer Other     Diabetes Other     Glaucoma Other     Rheum arthritis Other     Kidney disease Other     Breast cancer Neg Hx      Social History     Tobacco Use    Smoking status: Never     Passive exposure: Never    Smokeless tobacco: Never   Vaping Use    Vaping status: Never Used   Substance Use Topics    Alcohol use: No    Drug use: No     Medications Prior to Admission   Medication Sig Dispense Refill Last Dose/Taking    allopurinol (ZYLOPRIM) 100 MG tablet TAKE 1 TABLET BY MOUTH 1 TIME EACH DAY.   6/17/2025 Morning    atorvastatin (LIPITOR) 20 MG tablet 1 tablet Every Night.   6/17/2025 Morning    Calcium Carbonate+Vitamin D (RA Calcium Plus Vitamin D) 600-200 MG-UNIT tablet Take 1 tablet by mouth 2 (Two) Times a Day.   6/17/2025 Morning    Cholecalciferol (Vitamin D) 50 MCG (2000 UT) tablet TAKE 1 TABLET BY MOUTH EVERY DAY 90 tablet 3 6/17/2025 Morning    Cholecalciferol 50 MCG (2000 UT) tablet Take 1 tablet by mouth Daily.   6/17/2025 Morning    clopidogrel (PLAVIX) 75 MG tablet Take 1 tablet by mouth Daily.   6/17/2025 Morning    flecainide (TAMBOCOR) 100  MG tablet Take 1 tablet by mouth 2 (Two) Times a Day.   6/17/2025 Morning    furosemide (LASIX) 40 MG tablet Take 1 tablet by mouth 2 (Two) Times a Day.   6/17/2025 Morning    glyBURIDE-metFORMIN (GLUCOVANCE) 5-500 MG per tablet Take 2 tablets by mouth 2 (Two) Times a Day. (Patient taking differently: Take 1 tablet by mouth 2 (Two) Times a Day.)   Patient Taking Differently    isosorbide mononitrate (IMDUR) 30 MG 24 hr tablet Take 1 tablet by mouth Daily.   6/17/2025 Morning    losartan (COZAAR) 50 MG tablet Take 1 tablet by mouth Daily.   6/17/2025 Morning    methIMAzole (TAPAZOLE) 5 MG tablet TAKE ONE TABLET BY MOUTH DAILY 30 tablet 0 6/17/2025 Morning    metoprolol tartrate (LOPRESSOR) 50 MG tablet 1 tablet 2 (Two) Times a Day.   6/17/2025 Morning    omeprazole (priLOSEC) 40 MG capsule Take 1 capsule by mouth Daily.   6/17/2025 Morning    [DISCONTINUED] acetaminophen (TYLENOL) 500 MG tablet Take 2 tablets by mouth Every 6 (Six) Hours As Needed. (Patient not taking: Reported on 11/19/2024)       [DISCONTINUED] acetaminophen (TYLENOL) 500 MG tablet Take 2 tablets by mouth. (Patient not taking: Reported on 11/19/2024)       [DISCONTINUED] dicyclomine (BENTYL) 20 MG tablet Take 1 tablet by mouth 2 (Two) Times a Day. (Patient not taking: Reported on 11/19/2024)   More than a month    [DISCONTINUED] doxycycline (VIBRAMYCIN) 100 MG capsule TAKE 1 CAP BY MOUTH 2 TIMES A DAY FOR 10DAYS WITH AT LEAST 8OZ OF WATER DON'T LIE DOWN FOR 30 MIN (Patient not taking: Reported on 8/9/2024)   Not Taking    [DISCONTINUED] Mirabegron ER (MYRBETRIQ) 50 MG tablet sustained-release 24 hour 24 hr tablet Take 50 mg by mouth Daily. (Patient not taking: Reported on 11/19/2024)       [DISCONTINUED] rivaroxaban (XARELTO) tablet Take 1 tablet by mouth Daily. (Patient not taking: Reported on 6/2/2025)       [DISCONTINUED] vitamin B-12 (CYANOCOBALAMIN) 1000 MCG tablet Take 1 tablet by mouth Daily. (Patient not taking: Reported on 6/2/2025)        "[DISCONTINUED] Vitamin E 100 units tablet 450 mg. (Patient not taking: Reported on 6/2/2025)       [DISCONTINUED] vitamin E 400 UNIT capsule Take 1 capsule by mouth Daily. (Patient not taking: Reported on 6/2/2025)       [DISCONTINUED] Xarelto 15 MG tablet Take 1 tablet by mouth Daily With Dinner. (Patient not taking: Reported on 6/2/2025)        Allergies:  Nsaids    Immunization History   Administered Date(s) Administered    COVID-19 (MODERNA) 1st,2nd,3rd Dose Monovalent 02/08/2021    COVID-19 (MODERNA) Monovalent Original Booster 06/13/2022    COVID-19 (PFIZER) Purple Cap Monovalent 12/17/2021    Fluzone High-Dose 65+yrs 09/30/2020, 11/19/2022       REVIEW OF SYSTEMS:  Please see the above history of present illness for pertinent positives and negatives.  The remainder of the patient's systems have been reviewed and are negative.     Vital Signs  Temp:  [97.9 °F (36.6 °C)-98.4 °F (36.9 °C)] 97.9 °F (36.6 °C)  Heart Rate:  [71-83] 83  Resp:  [14-16] 16  BP: (114-160)/(69-84) 150/72  Body mass index is 31.85 kg/m².    Flowsheet Rows      Flowsheet Row First Filed Value   Admission Height 157.5 cm (62\") Documented at 06/17/2025 1819   Admission Weight 85.3 kg (188 lb) Documented at 06/17/2025 1819             Physical Exam  Vitals reviewed.   Constitutional:       General: She is not in acute distress.     Appearance: She is obese. She is not ill-appearing.   HENT:      Head: Normocephalic and atraumatic.      Mouth/Throat:      Mouth: Mucous membranes are moist.   Eyes:      Extraocular Movements: Extraocular movements intact.      Pupils: Pupils are equal, round, and reactive to light.   Cardiovascular:      Rate and Rhythm: Normal rate. Rhythm irregular.   Pulmonary:      Effort: Pulmonary effort is normal. No respiratory distress.      Breath sounds: Normal breath sounds. No wheezing or rales.   Abdominal:      General: Abdomen is flat. Bowel sounds are normal. There is no distension.      Palpations: Abdomen is " soft.      Tenderness: There is no abdominal tenderness. There is no guarding.   Musculoskeletal:         General: No swelling.   Skin:     General: Skin is warm and dry.      Capillary Refill: Capillary refill takes less than 2 seconds.      Findings: No erythema.   Neurological:      General: No focal deficit present.      Mental Status: She is alert and oriented to person, place, and time.   Psychiatric:         Mood and Affect: Mood normal.         Behavior: Behavior normal.       Emotional Behavior:    Judgment and Insight: Good   Mental Status:  Alertness alert   Memory: Good   Mood and Affect:         Depression none               Anxiety none    Debilities:   Physical Weakness none obvious   Handicaps none   Disabilities none   Agitation none     Results Review:    I reviewed the patient's new clinical results.  Lab Results (most recent)       Procedure Component Value Units Date/Time    High Sensitivity Troponin T 1Hr [[id number]] Collected: 06/17/25 2000    Specimen: Blood Updated: 06/17/25 2006    Comprehensive Metabolic Panel [[id number]]  (Abnormal) Collected: 06/17/25 1846    Specimen: Blood Updated: 06/17/25 1910     Glucose 96 mg/dL      BUN 49.6 mg/dL      Creatinine 1.85 mg/dL      Sodium 141 mmol/L      Potassium 4.5 mmol/L      Chloride 105 mmol/L      CO2 21.0 mmol/L      Calcium 9.6 mg/dL      Total Protein 6.5 g/dL      Albumin 3.8 g/dL      ALT (SGPT) 13 U/L      AST (SGOT) 17 U/L      Alkaline Phosphatase 52 U/L      Total Bilirubin 0.2 mg/dL      Globulin 2.7 gm/dL      A/G Ratio 1.4 g/dL      BUN/Creatinine Ratio 26.8     Anion Gap 15.0 mmol/L      eGFR 28.5 mL/min/1.73     Narrative:      GFR Categories in Chronic Kidney Disease (CKD)              GFR Category          GFR (mL/min/1.73)    Interpretation  G1                    90 or greater        Normal or high (1)  G2                    60-89                Mild decrease (1)  G3a                   45-59                Mild to moderate  decrease  G3b                   30-44                Moderate to severe decrease  G4                    15-29                Severe decrease  G5                    14 or less           Kidney failure    (1)In the absence of evidence of kidney disease, neither GFR category G1 or G2 fulfill the criteria for CKD.    eGFR calculation 2021 CKD-EPI creatinine equation, which does not include race as a factor    Magnesium [[id number]]  (Abnormal) Collected: 06/17/25 1846    Specimen: Blood Updated: 06/17/25 1910     Magnesium 1.4 mg/dL     Phosphorus [[id number]]  (Normal) Collected: 06/17/25 1846    Specimen: Blood Updated: 06/17/25 1910     Phosphorus 4.4 mg/dL     BNP [[id number]]  (Abnormal) Collected: 06/17/25 1846    Specimen: Blood Updated: 06/17/25 1910     proBNP 1,277.0 pg/mL     Narrative:      This assay is used as an aid in the diagnosis of individuals suspected of having heart failure. It can be used as an aid in the diagnosis of acute decompensated heart failure (ADHF) in patients presenting with signs and symptoms of ADHF to the emergency department (ED). In addition, NT-proBNP of <300 pg/mL indicates ADHF is not likely.    Age Range Result Interpretation  NT-proBNP Concentration (pg/mL:      <50             Positive            >450                   Gray                 300-450                    Negative             <300    50-75           Positive            >900                  Gray                300-900                  Negative            <300      >75             Positive            >1800                  Gray                300-1800                  Negative            <300    High Sensitivity Troponin T [[id number]]  (Abnormal) Collected: 06/17/25 1846    Specimen: Blood Updated: 06/17/25 1910     HS Troponin T 26 ng/L     Narrative:      High Sensitive Troponin T Reference Range:  <14.0 ng/L- Negative Female for AMI  <22.0 ng/L- Negative Male for AMI  >=14 - Abnormal Female indicating possible  myocardial injury.  >=22 - Abnormal Male indicating possible myocardial injury.   Clinicians would have to utilize clinical acumen, EKG, Troponin, and serial changes to determine if it is an Acute Myocardial Infarction or myocardial injury due to an underlying chronic condition.         CBC & Differential [[id number]]  (Abnormal) Collected: 06/17/25 1846    Specimen: Blood Updated: 06/17/25 1852    Narrative:      The following orders were created for panel order CBC & Differential.  Procedure                               Abnormality         Status                     ---------                               -----------         ------                     CBC Auto Differential[[id number]]        Abnormal            Final result                 Please view results for these tests on the individual orders.    CBC Auto Differential [[id number]]  (Abnormal) Collected: 06/17/25 1846    Specimen: Blood Updated: 06/17/25 1852     WBC 6.73 10*3/mm3      RBC 3.00 10*6/mm3      Hemoglobin 10.0 g/dL      Hematocrit 29.5 %      MCV 98.3 fL      MCH 33.3 pg      MCHC 33.9 g/dL      RDW 14.7 %      RDW-SD 52.6 fl      MPV 11.3 fL      Platelets 159 10*3/mm3      Neutrophil % 63.7 %      Lymphocyte % 24.8 %      Monocyte % 7.7 %      Eosinophil % 3.0 %      Basophil % 0.4 %      Immature Grans % 0.4 %      Neutrophils, Absolute 4.28 10*3/mm3      Lymphocytes, Absolute 1.67 10*3/mm3      Monocytes, Absolute 0.52 10*3/mm3      Eosinophils, Absolute 0.20 10*3/mm3      Basophils, Absolute 0.03 10*3/mm3      Immature Grans, Absolute 0.03 10*3/mm3      nRBC 0.0 /100 WBC             Imaging Results (Most Recent)       Procedure Component Value Units Date/Time    XR Chest 1 View [[id number]] Collected: 06/17/25 2019     Updated: 06/17/25 2023    Narrative:      XR CHEST 1 VW    Date of Exam: 6/17/2025 7:29 PM EDT    Indication: irreg rhythm    Comparison: Chest radiograph 11/18/2022. Chest CT 2/18/2023.    Findings:      Mediastinum: Cardiac  silhouette appears unchanged and enlarged    Lungs: The lungs appear clear without focal consolidation appreciated.    Pleura: No pleural effusion or pneumothorax.    Bones and soft tissues: No acute, displaced fracture seen.        Impression:      Impression:  No radiographic evidence of acute cardiopulmonary abnormality.        Electronically Signed: Geovanny Ochoa MD    6/17/2025 8:20 PM EDT    Workstation ID: [id number]          reviewed    ECG/EMG Results (most recent)       Procedure Component Value Units Date/Time    ECG 12 Lead Other; irreg heart beat [[id number]] Collected: 06/17/25 1818     Updated: 06/17/25 2002     QT Interval 391 ms      QTC Interval 481 ms     Narrative:      HEART RATE=91  bpm  RR Ofgjpuaw=660  ms  MN Eudikjoq=394  ms  P Horizontal Axis=49  deg  P Front Axis=69  deg  QRSD Interval=91  ms  QT Cvpsbrgx=601  ms  WHqV=101  ms  QRS Axis=42  deg  T Wave Axis=27  deg  - ABNORMAL ECG -  Sinus rhythm  Paired ventricular premature complexes- new  Electronically Signed By: Thomas Hammond ([facility]) 2025-06-17 20:01:45  Date and Time of Study:2025-06-17 18:18:40    ECG 12 Lead Other; bigemini [[id number]]  (Abnormal) Resulted: 06/17/25 2154     Updated: 06/17/25 2154          reviewed    Assessment & Plan   Bigeminy PVCs/lightheadedness:  History of stents:  History of PAF:  History of CHF:  Hypertension:  Check orthostatics  Monitor on telemetry  Echo in am  Fall precautions  Hold home Lasix and losartan as per cardiology  Add home Plavix, Lipitor, metoprolol, flecainide, Imdur  Monitor     Electrolyte imbalance: Add electrolyte replacement protocol  Recheck lab in am    Lightheadedness? Secondary to new SSRI??  Fall precautions, see above    Pulmonary hypertension: Nothing acute currently    DM 2 in obese: A1c is 6.18%  Add home Glucovance    Anemia of chronic disease/macrocytic anemia/RIKI  No active blood loss noted, hemoglobin stable at 10.0    CKD stage III: Most recent baseline back to 2023 appears  "to be 1.4-1.7, currently 1.85  Recheck lab in a.m.    History of hyperthyroidism/thyroid nodule:  Recent TSH was normal on home methimazole, continue    GERD:  History of dysphagia:  H/O gastroparesis:   H/O IBS-D:  Patient states no current acute issues, eating long Denny Anderson that  has brought  Add protonix, sub for home omeprazole    Obesity:  Body mass index is 31.85 kg/m².    Anxiety: Hold new medicine that was started 2 days ago as it is felt this is likely contributory to symptoms    KRISTIN not on CPAP    I discussed the patient's findings and my recommendations with patient and staff.     Ayanna Machado, APRN  06/17/25  22:33 EDT     \"Dictated utilizing Dragon dictation\"    Note disclaimer: At [facility], we believe that sharing information builds trust and better relationships. You are receiving this note because you recently visited [facility]. It is possible you will see health information before a provider has talked with you about it. This kind of information can be easy to misunderstand. To help you fully understand what it means for your health, we urge you to discuss this note with your provider.        "

## 2025-06-18 NOTE — PLAN OF CARE
Problem: Adult Inpatient Plan of Care  Goal: Plan of Care Review  Outcome: Adequate for Care Transition  Goal: Patient-Specific Goal (Individualized)  Outcome: Adequate for Care Transition  Goal: Absence of Hospital-Acquired Illness or Injury  Outcome: Adequate for Care Transition  Goal: Optimal Comfort and Wellbeing  Outcome: Adequate for Care Transition  Goal: Readiness for Transition of Care  Outcome: Adequate for Care Transition     Problem: Skin Injury Risk Increased  Goal: Skin Health and Integrity  Outcome: Adequate for Care Transition     Problem: Comorbidity Management  Goal: Maintenance of Behavioral Health Symptom Control  Outcome: Adequate for Care Transition  Goal: Blood Glucose Level Within Target Range  Outcome: Adequate for Care Transition  Goal: Maintenance of Heart Failure Symptom Control  Outcome: Adequate for Care Transition  Goal: Blood Pressure in Desired Range  Outcome: Adequate for Care Transition     Problem: Electrolyte Imbalance  Goal: Electrolyte Balance  Outcome: Adequate for Care Transition   Goal Outcome Evaluation:

## 2025-06-18 NOTE — CONSULTS
[facility]        Patient Name: Lisa Gavin  Age/Sex: 74 y.o. female  : 1950  MRN: [id number]    Date of Admission: 2025  Date of Encounter Visit: 25  Encounter Provider: Chacho Paiz MD  Referring Provider: No ref. provider found  Place of Service: [facility] CARDIOLOGY  Patient Care Team:  Edith Chávez PA as PCP - General (Physician Assistant)  Franky Rausch MD as Consulting Physician (Endocrinology)  Sheldon Reid MD as Surgeon (Orthopedic Surgery)  Nathan Ramos MD as Consulting Physician (Cardiology)  Franky Rausch MD as Referring Physician (Endocrinology)  Stiven Simon MD as Consulting Physician (Hematology and Oncology)    Subjective:     Chief Complaint: Nausea and lightheadedness    Reason for consult: PVCs and dizziness    History of Present Illness:  Lisa Gavin is a 74 y.o. female patient of [place] [facility] with a history of PAF, diastolic heart failure, CKD, diabetes, hypothyroidism, gout, IBS, obesity, KRISTIN compliant with CPAP and arthritis.     Patient had a cardiac cath on 2024 with PCI of the proximal first diagonal and mid left circumflex and her flecainide was discontinued.    She was followed by heart rhythm center and was last seen on 2024.  She had an echo in 2024 that showed EF of 50 to 55%, mildly dilated LV, mild MR and trace to mild TR.    Patient was last seen by [place] [facility] on 2024 for follow-up.  Patient was doing well from a cardiac standpoint.  A little shortness of breath but states that she has been fighting an upper respiratory infection.  She denied chest pain, palpitations, edema, syncope or dizziness.  Patient remains on low-dose Xarelto and denies any bleeding or falls.  Patient was noted to have PVCs on EKG.  Patient on metoprolol 50 mg twice daily.  No medication changes were made    Patient presented to the  emergency room on 6/17/2025 with complaints of nausea and lightheadedness for the last few days.  Patient was supposed to have her blood pressure checked on Monday and sent to her doctor and she forgot so she did it today.  Her blood pressure cuff said her heart rate was 38 she rechecked it and it was still 38.  She vomited once yesterday.  Reported she was supposed to be on Xarelto but she quit 6 months ago because she could not afford it.  She is on Plavix and aspirin.  In ER, troponin T 20 6/27/2023, proBNP 1277, BUN 49.6, creatinine 1.85, mag 1.4, hemoglobin A1c 6.18, TSH normal at 1.99, hemoglobin 10, EKG showed bigeminy with some couplets PVCs.  We were called in the emergency room and patient was started on IV fluids and given magnesium.      I have asked to see for PVCs and dizziness.  She denies any complaints this morning.  She is having less ectopy on exam.  Telemetry shows improved ectopy now that her magnesium is normal.      Prior Cardiac Testing:    CATH 7/23/24    MPRESSION   ---------------------------------------------------------------------------     1. Exertional dyspnea/chest pressure consistent with angina pectoris for 6   months, class III   2. Moderate risk myocardial perfusion PET scan, abnormal   3. Chronic HFpEF   4. Elevated LVEDP 22   5. Hypertensive heart disease   6. No aortic valvular gradient pullback   7. Two-vessel coronary artery disease   8. Successful two-vessel PCI as above   9. preoperative cardiac evaluation   10. CKD 3   11. Diabetes   12. Hyperlipidemia   13. PAF   14. Chronic flecainide therapy   15. Chronic anticoagulation     ---------------------------------------------------------------------------   RECOMMENDATIONS   ---------------------------------------------------------------------------     1. Resume Xarelto at discharge   2. Aspirin for 1 week and then discontinue   3. Clopidogrel for 1 year   4. Would recommend delaying knee and bladder surgery for 4 months  "  5. Once stopping Plavix and Xarelto for surgery, would add back aspirin during   surgery temporarily to cover for stent   6. discontinue flecainide   7. General post PCI care per protocol   8. I discussed importance of adhering to antiplatelet therapy post cardiac   stenting. We discussed the risk of subacute stent thrombosis, myocardial   infarction, and death if the DAPT is not taken regularly. The patient   expressed good understanding.   9. Cardiac rehabilitation referral   10. Aggressive secondary prevention, risk factor modification, and medical   therapy. This would include a target HDL goal >40, LDL <70 in addition to a   heart healthy diet, and a goal of 30\" of routine aerobic activity 5-7 days a   week.   11. Empiric beta blocker, statin   12. JEOVANNY prophylaxis   13. Discharge in am if stable       ECHO 2/21/24    Summary:                 Mildly dilated left ventricle.                            The left ventricular systolic function is at the lower limits of normal.                            The estimated ejection fraction is 50-55%.                            There is mild to moderate left atrial enlargement.                            Mild mitral regurgitation is present.                            Trace-to-mild tricuspid regurgitation.                            The right ventricular systolic pressure is estimated to be 25-30 mmHg.         ECHO 11/18/22      Left ventricular systolic function is normal. Calculated left ventricular EF = 60.2%    Left ventricular diastolic function was normal.    Estimated right ventricular systolic pressure from tricuspid regurgitation is moderately elevated (45-55 mmHg). Calculated right ventricular systolic pressure from tricuspid regurgitation is 48 mmHg.    Mild to moderate pulmonary hypertension is present.      Stress test  2/21/20    Summary    Normal pharmacological stress SPECT Myocardial Perfusion Imaging.    No evidence of stress induced myocardial ischemia " or infarction.    Diaphragmatic attenuation artifact.      Summary of LV Function    Normal LV systolic function.     Past Medical History:  Past Medical History:   Diagnosis Date    A-fib     Arthritis     Congestive heart failure (CHF)     Depression     Disease of thyroid gland     Elevated cholesterol     GERD (gastroesophageal reflux disease)     Headache     Hypertension     Kidney disease     Sleep apnea     Type 2 diabetes mellitus        Past Surgical History:   Procedure Laterality Date    CHOLECYSTECTOMY      COLONOSCOPY N/A 04/29/2022    Procedure: COLONOSCOPY;  Surgeon: Parmjit Traore MD;  Location: [facility];  Service: Gastroenterology;  Laterality: N/A;  Diverticulosis    CORONARY STENT PLACEMENT      HYSTERECTOMY  1996    bilateral oophorectomy for heavy bleeding. No HRT    ROTATOR CUFF REPAIR      ACUTE    SHOULDER SURGERY Bilateral     hAS HAD ROTATOR CUFF SURGERY ON BOTH SHOULDERS       Home Medications:   Medications Prior to Admission   Medication Sig Dispense Refill Last Dose/Taking    allopurinol (ZYLOPRIM) 100 MG tablet TAKE 1 TABLET BY MOUTH 1 TIME EACH DAY.   6/17/2025 Morning    atorvastatin (LIPITOR) 20 MG tablet 1 tablet Every Night.   6/17/2025 Morning    Calcium Carbonate+Vitamin D (RA Calcium Plus Vitamin D) 600-200 MG-UNIT tablet Take 1 tablet by mouth 2 (Two) Times a Day.   6/17/2025 Morning    Cholecalciferol (Vitamin D) 50 MCG (2000 UT) tablet TAKE 1 TABLET BY MOUTH EVERY DAY 90 tablet 3 6/17/2025 Morning    clopidogrel (PLAVIX) 75 MG tablet Take 1 tablet by mouth Daily.   6/17/2025 Morning    DULoxetine (CYMBALTA) 30 MG capsule Take 1 capsule by mouth Daily.   Taking    flecainide (TAMBOCOR) 100 MG tablet Take 1 tablet by mouth 2 (Two) Times a Day.   6/17/2025 Morning    furosemide (LASIX) 40 MG tablet Take 1 tablet by mouth 2 (Two) Times a Day.   6/17/2025 Morning    glyBURIDE-metFORMIN (GLUCOVANCE) 5-500 MG per tablet Take 2 tablets by mouth 2 (Two) Times a Day.  (Patient taking differently: Take 1 tablet by mouth 2 (Two) Times a Day.)   Patient Taking Differently    isosorbide mononitrate (IMDUR) 30 MG 24 hr tablet Take 1 tablet by mouth Daily.   6/17/2025 Morning    losartan (COZAAR) 50 MG tablet Take 1 tablet by mouth Daily.   6/17/2025 Morning    methIMAzole (TAPAZOLE) 5 MG tablet TAKE ONE TABLET BY MOUTH DAILY 30 tablet 0 6/17/2025 Morning    metoprolol tartrate (LOPRESSOR) 50 MG tablet 1 tablet 2 (Two) Times a Day.   6/17/2025 Evening    omeprazole (priLOSEC) 40 MG capsule Take 1 capsule by mouth Daily.   6/17/2025 Morning    Cholecalciferol 50 MCG (2000 UT) tablet Take 1 tablet by mouth Daily. (Patient not taking: Reported on 6/17/2025)   Not Taking    [DISCONTINUED] acetaminophen (TYLENOL) 500 MG tablet Take 2 tablets by mouth Every 6 (Six) Hours As Needed. (Patient not taking: Reported on 11/19/2024)       [DISCONTINUED] acetaminophen (TYLENOL) 500 MG tablet Take 2 tablets by mouth. (Patient not taking: Reported on 11/19/2024)       [DISCONTINUED] dicyclomine (BENTYL) 20 MG tablet Take 1 tablet by mouth 2 (Two) Times a Day. (Patient not taking: Reported on 11/19/2024)   More than a month    [DISCONTINUED] doxycycline (VIBRAMYCIN) 100 MG capsule TAKE 1 CAP BY MOUTH 2 TIMES A DAY FOR 10DAYS WITH AT LEAST 8OZ OF WATER DON'T LIE DOWN FOR 30 MIN (Patient not taking: Reported on 8/9/2024)   Not Taking    [DISCONTINUED] Mirabegron ER (MYRBETRIQ) 50 MG tablet sustained-release 24 hour 24 hr tablet Take 50 mg by mouth Daily. (Patient not taking: Reported on 11/19/2024)       [DISCONTINUED] rivaroxaban (XARELTO) tablet Take 1 tablet by mouth Daily. (Patient not taking: Reported on 6/2/2025)       [DISCONTINUED] vitamin B-12 (CYANOCOBALAMIN) 1000 MCG tablet Take 1 tablet by mouth Daily. (Patient not taking: Reported on 6/2/2025)       [DISCONTINUED] Vitamin E 100 units tablet 450 mg. (Patient not taking: Reported on 6/2/2025)       [DISCONTINUED] vitamin E 400 UNIT capsule  Take 1 capsule by mouth Daily. (Patient not taking: Reported on 6/2/2025)       [DISCONTINUED] Xarelto 15 MG tablet Take 1 tablet by mouth Daily With Dinner. (Patient not taking: Reported on 6/2/2025)          Allergies:  Allergies   Allergen Reactions    Nsaids Other (See Comments)     CKD        Past Social History:  Social History     Socioeconomic History    Marital status:    Tobacco Use    Smoking status: Never     Passive exposure: Never    Smokeless tobacco: Never   Vaping Use    Vaping status: Never Used   Substance and Sexual Activity    Alcohol use: No    Drug use: No    Sexual activity: Defer       Past Family History:  Family History   Problem Relation Age of Onset    Diabetes Mother     Kidney disease Father     Arthritis Father     Kidney cancer Father     Prostate cancer Father     Cancer Other     Diabetes Other     Glaucoma Other     Rheum arthritis Other     Kidney disease Other     Breast cancer Neg Hx        REVIEW OF SYSTEMS:   14 point ROS was performed and is negative except as outlined in HPI          Objective:   Temp:  [97.9 °F (36.6 °C)-98.4 °F (36.9 °C)] 97.9 °F (36.6 °C)  Heart Rate:  [71-83] 79  Resp:  [14-16] 16  BP: (114-160)/(63-84) 128/63     Intake/Output Summary (Last 24 hours) at 6/18/2025 0831  Last data filed at 6/18/2025 0710  Gross per 24 hour   Intake --   Output 1050 ml   Net -1050 ml     Body mass index is 31.85 kg/m².      06/17/25  1819 06/17/25 2122   Weight: 85.3 kg (188 lb) 81.6 kg (179 lb 12.8 oz)     Weight change:     General Appearance:    Alert, cooperative, in no acute distress   Throat:   oral mucosa moist   Neck:   supple, trachea midline, no thyromegaly, no carotid bruit, no JVD   Lungs:     Clear to auscultation,respirations regular, even and unlabored .    Heart:  Occasional ectopy.  Regular rhythm and normal rate, normal S1 and S2, no murmur, no gallop, no rub, no click   Chest Wall:    No abnormalities observed   Abdomen:     nondistended, no  "guarding, no rebound  tenderness   Extremities:   Moves all extremities well, noedema, no cyanosis, no redness   Pulses:   Pulses palpable and equal bilaterally. Normal radial, carotid, femoral, dorsalis pedis and posterior tibial pulses bilaterally.    Skin:  Psychiatric:   No bleeding, bruising or rash    Alert and oriented x 3, normal mood and affect     Lab Review:   Results from last 7 days   Lab Units 06/18/25  0557 06/17/25  1846   SODIUM mmol/L 143 141   POTASSIUM mmol/L 4.5 4.5   CHLORIDE mmol/L 109* 105   CO2 mmol/L 22.7 21.0*   BUN mg/dL 42.5* 49.6*   CREATININE mg/dL 1.49* 1.85*   GLUCOSE mg/dL 162* 96   CALCIUM mg/dL 9.0 9.6   AST (SGOT) U/L  --  17   ALT (SGPT) U/L  --  13     Results from last 7 days   Lab Units 06/18/25  0557 06/17/25 2000 06/17/25  1846   HSTROP T ng/L 23* 27* 26*     Results from last 7 days   Lab Units 06/18/25  0557 06/17/25  1846   WBC 10*3/mm3 5.33 6.73   HEMOGLOBIN g/dL 9.7* 10.0*   HEMATOCRIT % 28.9* 29.5*   PLATELETS 10*3/mm3 145 159         Results from last 7 days   Lab Units 06/18/25  0557 06/17/25  1846   MAGNESIUM mg/dL 2.1 1.4*           Invalid input(s): \"LDLCALC\"  Results from last 7 days   Lab Units 06/17/25  1846   PROBNP pg/mL 1,277.0*     Results from last 7 days   Lab Units 06/17/25 2000   TSH uIU/mL 1.990       Echo EF Estimated  No results found for: \"ECHOEFEST\"    EKG:                             I personally viewed and interpreted the patient's EKG it shows sinus rhythm with bigeminy.    Imaging:  Imaging Results (Most Recent)       Procedure Component Value Units Date/Time    XR Chest 1 View [[id number]] Collected: 06/17/25 2019     Updated: 06/17/25 2023    Narrative:      XR CHEST 1 VW    Date of Exam: 6/17/2025 7:29 PM EDT    Indication: irreg rhythm    Comparison: Chest radiograph 11/18/2022. Chest CT 2/18/2023.    Findings:      Mediastinum: Cardiac silhouette appears unchanged and enlarged    Lungs: The lungs appear clear without focal consolidation " appreciated.    Pleura: No pleural effusion or pneumothorax.    Bones and soft tissues: No acute, displaced fracture seen.        Impression:      Impression:  No radiographic evidence of acute cardiopulmonary abnormality.        Electronically Signed: Geovanny Ochoa MD    6/17/2025 8:20 PM EDT    Workstation ID: [id number]                Assessment:     Active Hospital Problems    Diagnosis  POA    **Bigeminy [I49.8]  Yes      Resolved Hospital Problems   No resolved problems to display.          Plan:     Bigeminy/frequent PVCs: Improved now that electrolytes have been replaced.  I suspect that this was worsened due to hypomagnesemia in the setting of diarrhea and GI losses  Continue home dose metoprolol  Arrange for Zio patch per below  Atrial fibrillation, paroxysmal: Previously on flecainide, but she has a history now of ischemic heart disease so I would like to avoid this.  Stop flecainide.  Monitor with Zio patch per above.  She previously was not on Xarelto due to cost, however we did discuss that there been some changes Medicare within this drug to be more affordable now, she is amenable to retrying.  I would recommend she start Eliquis 5 twice daily, and continue aspirin monotherapy per above, we can get 30 days free of Eliquis and we can determine the long-term treatment plan after that  Coronary artery disease, status post PCI July 2024, PCI to first diagonal 3.0 x 24 Synergy NELIA, postdilated 3.75, PCI to mid circumflex 2.5 x 38 NELIA, postdilated 3.0.: I think we can drop the Plavix and continue aspirin and Eliquis dual therapy.  She does not have angina.  Reasonable to recheck an echocardiogram.  Reasonable to continue Imdur, although not sure if she is having angina  Stop plavix and continue asa and eliquis   HFpEF with elevated LVEDP of 22.  May need SGLT2 inhibitor in the future.  Continue home dose of furosemide  CKD 3A.  Another reason to consider SGLT2 inhibition.  Hypertension.  Continue home  doses of metoprolol and losartan.  Recent diarrhea with GI losses of magnesium.  Improved now that Cymbalta has been discontinued.  Per medicine team.    Thank you for allowing me to participate in the care of Lisa Gavin.  No further testing required from a cardiac standpoint.  She wants to transition to [facility] will see her back in 1 month after the ZIO is complete.  Feel free to contact me directly with any further questions or concerns.    Chacho Paiz MD  [facility]  06/18/25  06:31 EDT      Part of this note may be an electronic transcription/translation of spoken language to printed text using the Dragon Dictation System.

## 2025-06-18 NOTE — DISCHARGE INSTR - APPOINTMENTS
Patient has follow up appointment with Rudi Mix (Cardiology) on 7/21/2025 at 1:00                            [phone]    Patient has follow up appointment with Maria Elena Gates (Primary Care) on 6/30/2025 at 11:00                            [phone]

## 2025-06-18 NOTE — PLAN OF CARE
Goal Outcome Evaluation:  Plan of Care Reviewed With: patient        Progress: no change  Outcome Evaluation: pt admitted overnight to [facility], All care explained, questions answered. pt has no complaints,  is at bedside, education given on call light and orientation to room given. orthostatic BP obtained- see flowsheets, ambulated to bathroom w/ stby assist.

## 2025-06-18 NOTE — CASE MANAGEMENT/SOCIAL WORK
Continued Stay Note  KELLI Tate     Patient Name: Lisa Gavin  MRN: [id number]  Today's Date: 6/18/2025    Admit Date: 6/17/2025    Plan: plan home with    Discharge Plan       Row Name 06/18/25 9752       Plan    Plan plan home with     Patient/Family in Agreement with Plan yes    Plan Comments Spoke with patient at bedside, face sheet verified. Patient lives in a home with her  and grandson. She is independent of ADLs including driving.  She denies use of DME/HH/Rehab services.  She does not have a living will. She sees Lacey PAL as PCP.  She uses [facility]. She states she is unable to afford Xarelto, she has spoken with her PCP and also applied on line to  for med cost assistance and was told she is over income for assistance. Encouraged patient to discuss with prescribing practitioner and possibly change to a more affordable medication. Understanding verbalized.  She does not anticipate any needs at dc. CM # placed on white board, will continue to follow.                   Discharge Codes    No documentation.                       Linwood Medina RN

## 2025-06-18 NOTE — DISCHARGE SUMMARY
Lisa Gavin  1950  [id number]    Hospitalists Discharge Summary    Date of Admission: 6/17/2025  Date of Discharge:  6/18/2025    Primary Discharge Diagnoses:   Bigeminy/frequent PVCs  Paroxysmal atrial fibrillation    Secondary Discharge Diagnoses:   CAD s/p PCI and NELIA 7/2024  HFpEF  CKD stage IIIa  Hypertension  GERD  History of dysphagia, IBS-D, gastroparesis  Hyperthyroidism  Anemia of chronic disease/RIKI/macrocytic anemia  DM2 in obese Body mass index is 31.64 kg/m².  Electrolyte imbalance  Diarrhea  Anxiety  KRISTIN not on CPAP    History of Present Illness:  The patient is a 74-year-old female who presented to the emergency department secondary to 2 to 3 days of nausea with some dizziness that began yesterday.  She does not associate dizziness with any position specifically, symptoms can occur while laying standing or walking.  She has had a light headache it is not present now.  She denies palpitations, chest pain, shortness of air, syncopal sensation.  *Notably she did start a new antidepressant 2 days ago that correlates with onset of the symptoms.     Significant findings in ER include HS troponin 26> 27, creatinine 1.85, magnesium 1.4, proBNP 1277.0, hemoglobin 10.0  EKG showed bigeminy with some couplets PVCs     KAE Lama contacted Dr. Nugent with cardiology who requested patient be given magnesium 3 g, 1 L IV fluids, hold Lasix and losartan, give dose of metoprolol and check D-dimer. He also said she could have a full regular diet. D-dimer was negative.     At time of my exam she has no symptoms whatsoever.  She does note that she never has an appetite but has been eating her usual amount without nausea or vomiting.  She also notes cough and a mild sore throat over the past 4 days but no fever, chills or other respiratory concerns.     The patient has a history of uncontrolled DM2, CHF, PAF, hypertension, hyperthyroidism/thyroid nodule, anxiety, dysphagia, IBS-D, gastroparesis, GERD,  CKD 3, anemia, pulmonary hypertension, KRISTIN     She otherwise currently denies f/c/headache/rhinorrhea/nasal congestion/syncopal sensation/cough/soa/n/v/d/chest pain/abdominal pain/recent illness/sick exposures/change in bowel or bladder habits/no weight change/bloody emesis or bloody stools/change in medications or any other new concerns.    Hospital Course:  The patient was followed in consultation by cardiology.  Echo was done showing EF 58.2%, moderately dilated LA, moderate MR, mildly elevated RVSP, showing slightly worse mitral regurgitation since echo 11/2022.  Her flecainide was stopped due to history of ischemic heart disease.  Zio patch was placed, Eliquis was started, aspirin was continued.  The patient wanted to transition to [facility] cardiology, therefore follow-up was scheduled for 1 month.  Follow-up PCP 1 week    PCP  Patient Care Team:  Edith Chávez PA as PCP - General (Physician Assistant)  Franky Rausch MD as Consulting Physician (Endocrinology)  Sheldon Reid MD as Surgeon (Orthopedic Surgery)  Nathan Ramos MD as Consulting Physician (Cardiology)  Franky Rausch MD as Referring Physician (Endocrinology)  Stiven Simon MD as Consulting Physician (Hematology and Oncology)    Consults:   Consults       Date and Time Order Name Status Description    6/17/2025 10:03 PM Inpatient Cardiology Consult Completed             Operations and Procedures Performed:       Adult Transthoracic Echo Complete w/ Color, Spectral and Contrast if necessary per protocol  Result Date: 6/18/2025  Narrative:   Left ventricular systolic function is normal. Calculated left ventricular EF = 58.2%   Left ventricular diastolic function was normal.   The left atrial cavity is moderately dilated.   Moderate mitral valve regurgitation is present.   Estimated right ventricular systolic pressure from tricuspid regurgitation is mildly elevated (35-45 mmHg). Calculated right ventricular systolic  pressure from tricuspid regurgitation is 37 mmHg.   Compared to echocardiogram 11/18/2022, the mitral regurgitation is worse.     XR Chest 1 View  Result Date: 6/17/2025  Narrative: XR CHEST 1 VW Date of Exam: 6/17/2025 7:29 PM EDT Indication: irreg rhythm Comparison: Chest radiograph 11/18/2022. Chest CT 2/18/2023. Findings: Mediastinum: Cardiac silhouette appears unchanged and enlarged Lungs: The lungs appear clear without focal consolidation appreciated. Pleura: No pleural effusion or pneumothorax. Bones and soft tissues: No acute, displaced fracture seen.     Impression: Impression: No radiographic evidence of acute cardiopulmonary abnormality. Electronically Signed: Geovanny Ochoa MD  6/17/2025 8:20 PM EDT  Workstation ID: [id number]    XR Knee 3+ View With Sunrise Left  Result Date: 6/2/2025  Narrative: Please see performing physician's note for result.    Allergies:  is allergic to nsaids.      Discharge Medications:     Discharge Medications        New Medications        Instructions Start Date   apixaban 5 MG tablet tablet  Commonly known as: ELIQUIS   5 mg, Oral, Every 12 Hours Scheduled      aspirin 81 MG EC tablet   81 mg, Oral, Daily   Start Date: June 19, 2025            Changes to Medications        Instructions Start Date   glyBURIDE-metFORMIN 5-500 MG per tablet  Commonly known as: GLUCOVANCE  What changed: how much to take   2 tablets, 2 Times Daily      Vitamin D 50 MCG (2000 UT) tablet  What changed: Another medication with the same name was removed. Continue taking this medication, and follow the directions you see here.   TAKE 1 TABLET BY MOUTH EVERY DAY             Continue These Medications        Instructions Start Date   allopurinol 100 MG tablet  Commonly known as: ZYLOPRIM   TAKE 1 TABLET BY MOUTH 1 TIME EACH DAY.      atorvastatin 20 MG tablet  Commonly known as: LIPITOR   20 mg, Nightly      Calcium Carbonate+Vitamin D 600-200 MG-UNIT tablet  Commonly known as: RA Calcium Plus Vitamin  D   1 tablet, Oral, 2 Times Daily      DULoxetine 30 MG capsule  Commonly known as: CYMBALTA   30 mg, Daily      furosemide 40 MG tablet  Commonly known as: LASIX   40 mg, 2 Times Daily      isosorbide mononitrate 30 MG 24 hr tablet  Commonly known as: IMDUR   30 mg, Daily      losartan 50 MG tablet  Commonly known as: COZAAR   50 mg, Daily      methIMAzole 5 MG tablet  Commonly known as: TAPAZOLE   TAKE ONE TABLET BY MOUTH DAILY      metoprolol tartrate 50 MG tablet  Commonly known as: LOPRESSOR   50 mg, 2 Times Daily      omeprazole 40 MG capsule  Commonly known as: priLOSEC   40 mg, Daily             Stop These Medications      acetaminophen 500 MG tablet  Commonly known as: TYLENOL     clopidogrel 75 MG tablet  Commonly known as: PLAVIX     dicyclomine 20 MG tablet  Commonly known as: BENTYL     doxycycline 100 MG capsule  Commonly known as: VIBRAMYCIN     flecainide 100 MG tablet  Commonly known as: TAMBOCOR     Mirabegron ER 50 MG tablet sustained-release 24 hour 24 hr tablet  Commonly known as: MYRBETRIQ     rivaroxaban 20 MG tablet  Commonly known as: XARELTO     vitamin B-12 1000 MCG tablet  Commonly known as: CYANOCOBALAMIN     Vitamin E 100 units tablet     vitamin E 400 UNIT capsule     Xarelto 15 MG tablet  Generic drug: rivaroxaban              Last Lab Results:   Lab Results (most recent)       Procedure Component Value Units Date/Time    CBC & Differential [[id number]]  (Abnormal) Collected: 06/18/25 0557    Specimen: Blood Updated: 06/18/25 0635    Narrative:      The following orders were created for panel order CBC & Differential.  Procedure                               Abnormality         Status                     ---------                               -----------         ------                     CBC Auto Differential[[id number]]        Abnormal            Final result                 Please view results for these tests on the individual orders.    CBC Auto Differential [[id number]]  (Abnormal)  Collected: 06/18/25 0557    Specimen: Blood Updated: 06/18/25 0635     WBC 5.33 10*3/mm3      RBC 2.93 10*6/mm3      Hemoglobin 9.7 g/dL      Hematocrit 28.9 %      MCV 98.6 fL      MCH 33.1 pg      MCHC 33.6 g/dL      RDW 14.9 %      RDW-SD 53.9 fl      MPV 11.5 fL      Platelets 145 10*3/mm3      Neutrophil % 59.6 %      Lymphocyte % 27.8 %      Monocyte % 8.4 %      Eosinophil % 3.2 %      Basophil % 0.8 %      Immature Grans % 0.2 %      Neutrophils, Absolute 3.18 10*3/mm3      Lymphocytes, Absolute 1.48 10*3/mm3      Monocytes, Absolute 0.45 10*3/mm3      Eosinophils, Absolute 0.17 10*3/mm3      Basophils, Absolute 0.04 10*3/mm3      Immature Grans, Absolute 0.01 10*3/mm3      nRBC 0.0 /100 WBC     Magnesium [[id number]]  (Normal) Collected: 06/18/25 0557    Specimen: Blood Updated: 06/18/25 0623     Magnesium 2.1 mg/dL     Basic Metabolic Panel [[id number]]  (Abnormal) Collected: 06/18/25 0557    Specimen: Blood Updated: 06/18/25 0622     Glucose 162 mg/dL      BUN 42.5 mg/dL      Creatinine 1.49 mg/dL      Sodium 143 mmol/L      Potassium 4.5 mmol/L      Chloride 109 mmol/L      CO2 22.7 mmol/L      Calcium 9.0 mg/dL      BUN/Creatinine Ratio 28.5     Anion Gap 11.3 mmol/L      eGFR 36.9 mL/min/1.73     Narrative:      GFR Categories in Chronic Kidney Disease (CKD)              GFR Category          GFR (mL/min/1.73)    Interpretation  G1                    90 or greater        Normal or high (1)  G2                    60-89                Mild decrease (1)  G3a                   45-59                Mild to moderate decrease  G3b                   30-44                Moderate to severe decrease  G4                    15-29                Severe decrease  G5                    14 or less           Kidney failure    (1)In the absence of evidence of kidney disease, neither GFR category G1 or G2 fulfill the criteria for CKD.    eGFR calculation 2021 CKD-EPI creatinine equation, which does not include race as a  factor    High Sensitivity Troponin T [[id number]]  (Abnormal) Collected: 06/18/25 0557    Specimen: Blood Updated: 06/18/25 0622     HS Troponin T 23 ng/L     Narrative:      High Sensitive Troponin T Reference Range:  <14.0 ng/L- Negative Female for AMI  <22.0 ng/L- Negative Male for AMI  >=14 - Abnormal Female indicating possible myocardial injury.  >=22 - Abnormal Male indicating possible myocardial injury.   Clinicians would have to utilize clinical acumen, EKG, Troponin, and serial changes to determine if it is an Acute Myocardial Infarction or myocardial injury due to an underlying chronic condition.         TSH [[id number]]  (Normal) Collected: 06/17/25 2000    Specimen: Blood Updated: 06/17/25 2226     TSH 1.990 uIU/mL     Hemoglobin A1c [[id number]]  (Abnormal) Collected: 06/17/25 1846    Specimen: Blood Updated: 06/17/25 2219     Hemoglobin A1C 6.18 %     Narrative:      Hemoglobin A1C Ranges:    Increased Risk for Diabetes  5.7% to 6.4%  Diabetes                     >= 6.5%  Diabetic Goal                < 7.0%    High Sensitivity Troponin T 1Hr [[id number]]  (Abnormal) Collected: 06/17/25 2000    Specimen: Blood Updated: 06/17/25 2027     HS Troponin T 27 ng/L      Troponin T Numeric Delta 1 ng/L      Troponin T % Delta 4    Narrative:      High Sensitive Troponin T Reference Range:  <14.0 ng/L- Negative Female for AMI  <22.0 ng/L- Negative Male for AMI  >=14 - Abnormal Female indicating possible myocardial injury.  >=22 - Abnormal Male indicating possible myocardial injury.   Clinicians would have to utilize clinical acumen, EKG, Troponin, and serial changes to determine if it is an Acute Myocardial Infarction or myocardial injury due to an underlying chronic condition.         Comprehensive Metabolic Panel [[id number]]  (Abnormal) Collected: 06/17/25 1846    Specimen: Blood Updated: 06/17/25 1910     Glucose 96 mg/dL      BUN 49.6 mg/dL      Creatinine 1.85 mg/dL      Sodium 141 mmol/L      Potassium 4.5  mmol/L      Chloride 105 mmol/L      CO2 21.0 mmol/L      Calcium 9.6 mg/dL      Total Protein 6.5 g/dL      Albumin 3.8 g/dL      ALT (SGPT) 13 U/L      AST (SGOT) 17 U/L      Alkaline Phosphatase 52 U/L      Total Bilirubin 0.2 mg/dL      Globulin 2.7 gm/dL      A/G Ratio 1.4 g/dL      BUN/Creatinine Ratio 26.8     Anion Gap 15.0 mmol/L      eGFR 28.5 mL/min/1.73     Narrative:      GFR Categories in Chronic Kidney Disease (CKD)              GFR Category          GFR (mL/min/1.73)    Interpretation  G1                    90 or greater        Normal or high (1)  G2                    60-89                Mild decrease (1)  G3a                   45-59                Mild to moderate decrease  G3b                   30-44                Moderate to severe decrease  G4                    15-29                Severe decrease  G5                    14 or less           Kidney failure    (1)In the absence of evidence of kidney disease, neither GFR category G1 or G2 fulfill the criteria for CKD.    eGFR calculation 2021 CKD-EPI creatinine equation, which does not include race as a factor    Magnesium [[id number]]  (Abnormal) Collected: 06/17/25 1846    Specimen: Blood Updated: 06/17/25 1910     Magnesium 1.4 mg/dL     Phosphorus [[id number]]  (Normal) Collected: 06/17/25 1846    Specimen: Blood Updated: 06/17/25 1910     Phosphorus 4.4 mg/dL     BNP [[id number]]  (Abnormal) Collected: 06/17/25 1846    Specimen: Blood Updated: 06/17/25 1910     proBNP 1,277.0 pg/mL     Narrative:      This assay is used as an aid in the diagnosis of individuals suspected of having heart failure. It can be used as an aid in the diagnosis of acute decompensated heart failure (ADHF) in patients presenting with signs and symptoms of ADHF to the emergency department (ED). In addition, NT-proBNP of <300 pg/mL indicates ADHF is not likely.    Age Range Result Interpretation  NT-proBNP Concentration (pg/mL:      <50             Positive             >450                   Gray                 300-450                    Negative             <300    50-75           Positive            >900                  Gray                300-900                  Negative            <300      >75             Positive            >1800                  Gray                300-1800                  Negative            <300    High Sensitivity Troponin T [[id number]]  (Abnormal) Collected: 06/17/25 1846    Specimen: Blood Updated: 06/17/25 1910     HS Troponin T 26 ng/L     Narrative:      High Sensitive Troponin T Reference Range:  <14.0 ng/L- Negative Female for AMI  <22.0 ng/L- Negative Male for AMI  >=14 - Abnormal Female indicating possible myocardial injury.  >=22 - Abnormal Male indicating possible myocardial injury.   Clinicians would have to utilize clinical acumen, EKG, Troponin, and serial changes to determine if it is an Acute Myocardial Infarction or myocardial injury due to an underlying chronic condition.         CBC & Differential [[id number]]  (Abnormal) Collected: 06/17/25 1846    Specimen: Blood Updated: 06/17/25 1852    Narrative:      The following orders were created for panel order CBC & Differential.  Procedure                               Abnormality         Status                     ---------                               -----------         ------                     CBC Auto Differential[[id number]]        Abnormal            Final result                 Please view results for these tests on the individual orders.    CBC Auto Differential [[id number]]  (Abnormal) Collected: 06/17/25 1846    Specimen: Blood Updated: 06/17/25 1852     WBC 6.73 10*3/mm3      RBC 3.00 10*6/mm3      Hemoglobin 10.0 g/dL      Hematocrit 29.5 %      MCV 98.3 fL      MCH 33.3 pg      MCHC 33.9 g/dL      RDW 14.7 %      RDW-SD 52.6 fl      MPV 11.3 fL      Platelets 159 10*3/mm3      Neutrophil % 63.7 %      Lymphocyte % 24.8 %      Monocyte % 7.7 %      Eosinophil % 3.0 %       Basophil % 0.4 %      Immature Grans % 0.4 %      Neutrophils, Absolute 4.28 10*3/mm3      Lymphocytes, Absolute 1.67 10*3/mm3      Monocytes, Absolute 0.52 10*3/mm3      Eosinophils, Absolute 0.20 10*3/mm3      Basophils, Absolute 0.03 10*3/mm3      Immature Grans, Absolute 0.03 10*3/mm3      nRBC 0.0 /100 WBC           Imaging Results (Most Recent)       Procedure Component Value Units Date/Time    XR Chest 1 View [[id number]] Collected: 06/17/25 2019     Updated: 06/17/25 2023    Narrative:      XR CHEST 1 VW    Date of Exam: 6/17/2025 7:29 PM EDT    Indication: irreg rhythm    Comparison: Chest radiograph 11/18/2022. Chest CT 2/18/2023.    Findings:      Mediastinum: Cardiac silhouette appears unchanged and enlarged    Lungs: The lungs appear clear without focal consolidation appreciated.    Pleura: No pleural effusion or pneumothorax.    Bones and soft tissues: No acute, displaced fracture seen.        Impression:      Impression:  No radiographic evidence of acute cardiopulmonary abnormality.        Electronically Signed: Geovanny Ochoa MD    6/17/2025 8:20 PM EDT    Workstation ID: [id number]            PROCEDURES      Condition on Discharge: Stable    Physical Exam at Discharge  Vital Signs  Temp:  [97.5 °F (36.4 °C)-98.4 °F (36.9 °C)] 97.5 °F (36.4 °C)  Heart Rate:  [64-83] 64  Resp:  [14-18] 18  BP: (114-160)/(58-84) 122/58  Body mass index is 31.64 kg/m².    Physical Exam:  Physical Exam   Constitutional: Patient appears in no acute distress   Cardiovascular: Regular rate, regular rhythm, S1 normal and S2 normal.  Exam reveals no gallop and no friction rub.  No murmur heard.  Pulmonary/Chest: Lungs are clear to auscultation bilaterally. No respiratory distress. No wheezes. No rhonchi. No rales.   Abdominal: Obese. Soft. Bowel sounds are normal.  Musculoskeletal: Normal Muscle tone  Extremities: No edema.   Neurological: Patient is alert and oriented to person, place, and time. Cranial nerves II-XII are  grossly intact with no focal deficits.    Discharge Disposition  Home    Visiting Nurse:    No     Home PT/OT:  No     Home Safety Evaluation:  No     DME  None new    Discharge Diet:      Dietary Orders (From admission, onward)       Start     Ordered    06/17/25 2204  Diet: Regular/House; Fluid Consistency: Thin (IDDSI 0)  Diet Effective Now        References:    Diet Order Definitions   Question Answer Comment   Diets: Regular/House    Fluid Consistency: Thin (IDDSI 0)        06/17/25 2203                    Activity at Discharge:  As tolerated      Follow-up Appointments  Future Appointments   Date Time Provider Department Center   7/2/2025  1:00 PM [facility] CT 1 BH [facility] CT [facility]   7/7/2025  9:00 AM Maryanne Berrios APRN [facility] OS [facility]   7/21/2025  1:00 PM Rudi Mix PA-C [facility] CD  [facility]   8/14/2025  8:30 AM Modesto Duron DO [facility] SLP [facility] None     Additional Instructions for the Follow-ups that You Need to Schedule       Discharge Follow-up with PCP   As directed       Currently Documented PCP:    Edith Chávez PA    PCP Phone Number:    [phone]     Follow Up Details: 1-2 weeks        Discharge Follow-up with Specified Provider: Chacho Paiz M.D. as scheduled   As directed      To: Chacho Paiz M.D. as scheduled                Test Results Pending at Discharge: None       Mando Rosado MD  06/18/25  13:59 EDT    Time: <30 minutes

## 2025-06-19 ENCOUNTER — READMISSION MANAGEMENT (OUTPATIENT)
Dept: CALL CENTER | Facility: HOSPITAL | Age: 75
End: 2025-06-19
Payer: MEDICARE

## 2025-06-19 NOTE — CASE MANAGEMENT/SOCIAL WORK
Case Management Discharge Note      Final Note: DCd home         Selected Continued Care - Discharged on 6/18/2025 Admission date: 6/17/2025 - Discharge disposition: Home or Self Care      Destination    No services have been selected for the patient.                Durable Medical Equipment    No services have been selected for the patient.                Dialysis/Infusion    No services have been selected for the patient.                Home Medical Care    No services have been selected for the patient.                Therapy    No services have been selected for the patient.                Community Resources    No services have been selected for the patient.                Community & DME    No services have been selected for the patient.                         Final Discharge Disposition Code: 01 - home or self-care

## 2025-06-19 NOTE — OUTREACH NOTE
Prep Survey      Flowsheet Row Responses   [facility] facility patient discharged from? [place]   Is LACE score < 7 ? No   Eligibility Readm Mgmt   Discharge diagnosis Bigeminy PVCs/lightheadedness   Does the patient have one of the following disease processes/diagnoses(primary or secondary)? Other   Does the patient have Home health ordered? No   Is there a DME ordered? No   Prep survey completed? Yes            Susannah ALAS - Registered Nurse

## 2025-06-24 ENCOUNTER — READMISSION MANAGEMENT (OUTPATIENT)
Dept: CALL CENTER | Facility: HOSPITAL | Age: 75
End: 2025-06-24
Payer: MEDICARE

## 2025-06-24 NOTE — OUTREACH NOTE
LAG < 7 Survey      Flowsheet Row Responses   [facility] patient discharged from? [place]   Does the patient have one of the following disease processes/diagnoses(primary or secondary)? Other   Call start time 1005   Call end time 1006   Discharge diagnosis Bigeminy PVCs/lightheadedness   Person spoke with today (if not patient) and relationship patient   Meds reviewed with patient/caregiver? Yes   Does the patient have all medications ordered at discharge? Yes   Prescription comments START taking:  apixaban (ELIQUIS)  aspirin  Start taking on: June 19, 2025  CHANGE how you take:  Vitamin D -- Another medication with the same name  was removed. Continue taking this medication, and  follow the directions you see here.  STOP taking:  acetaminophen 500 MG tablet (TYLENOL)  clopidogrel 75 MG tablet (PLAVIX)  dicyclomine 20 MG tablet (BENTYL)  doxycycline 100 MG capsule (VIBRAMYCIN)  flecainide 100 MG tablet (TAMBOCOR)  Mirabegron ER 50 MG tablet sustained-release 24 hour  24 hr tablet (MYRBETRIQ)  rivaroxaban 20 MG tablet (XARELTO)  vitamin B-12 1000 MCG tablet (CYANOCOBALAMIN)  Vitamin E 100 units tablet  vitamin E 400 UNIT capsule  Xarelto 15 MG tablet (rivaroxaban)   Is the patient taking all medications as directed (includes completed medication regime)? Yes   Medication comments no concerns or questions noted.   Does the patient have a primary care provider?  Yes   Comments regarding PCP 06/30 pcp and cards in july.   Has home health visited the patient within 72 hours of discharge? N/A   Has all DME been delivered? No   Psychosocial issues? No   Did the patient receive a copy of their discharge instructions? Yes   Nursing interventions Reviewed instructions with patient   What is the patient's perception of their health status since discharge? Improving   Is the patient/caregiver able to teach back signs and symptoms related to disease process for when to call PCP? Yes   Is the patient/caregiver able to  teach back signs and symptoms related to disease process for when to call 911? Yes   Is the patient/caregiver able to teach back the hierarchy of who to call/visit for symptoms/problems? PCP, Specialist, Home health nurse, Urgent Care, ED, 911 Yes   Graduated Yes   Wrap up additional comments patient reports doing well no concerns or questions noted.            Taina NOBLES - Registered Nurse

## 2025-07-02 ENCOUNTER — HOSPITAL ENCOUNTER (OUTPATIENT)
Dept: CT IMAGING | Facility: HOSPITAL | Age: 75
Discharge: HOME | End: 2025-07-02
Admitting: NURSE PRACTITIONER
Payer: MEDICARE

## 2025-07-02 DIAGNOSIS — M17.12 PRIMARY OSTEOARTHRITIS OF LEFT KNEE: ICD-10-CM

## 2025-07-02 DIAGNOSIS — S89.92XA INJURY OF LEFT KNEE, INITIAL ENCOUNTER: ICD-10-CM

## 2025-07-02 PROCEDURE — 73700 CT LOWER EXTREMITY W/O DYE: CPT

## 2025-07-07 ENCOUNTER — OFFICE VISIT (OUTPATIENT)
Dept: ORTHOPEDIC SURGERY | Facility: CLINIC | Age: 75
End: 2025-07-07
Payer: MEDICARE

## 2025-07-07 VITALS — BODY MASS INDEX: 31.54 KG/M2 | WEIGHT: 178 LBS | HEIGHT: 63 IN

## 2025-07-07 DIAGNOSIS — M17.12 PRIMARY OSTEOARTHRITIS OF LEFT KNEE: Primary | ICD-10-CM

## 2025-07-07 DIAGNOSIS — M70.52 PES ANSERINUS BURSITIS OF LEFT KNEE: ICD-10-CM

## 2025-07-07 DIAGNOSIS — M25.511 RIGHT SHOULDER PAIN, UNSPECIFIED CHRONICITY: ICD-10-CM

## 2025-07-07 DIAGNOSIS — M75.51 SUBACROMIAL BURSITIS OF RIGHT SHOULDER JOINT: ICD-10-CM

## 2025-07-07 RX ORDER — TRIAMCINOLONE ACETONIDE 40 MG/ML
80 INJECTION, SUSPENSION INTRA-ARTICULAR; INTRAMUSCULAR
Status: COMPLETED | OUTPATIENT
Start: 2025-07-07 | End: 2025-07-07

## 2025-07-07 RX ORDER — LIDOCAINE HYDROCHLORIDE 10 MG/ML
4 INJECTION, SOLUTION EPIDURAL; INFILTRATION; INTRACAUDAL; PERINEURAL
Status: COMPLETED | OUTPATIENT
Start: 2025-07-07 | End: 2025-07-07

## 2025-07-07 RX ORDER — ALENDRONATE SODIUM 70 MG/1
70 TABLET ORAL
COMMUNITY
Start: 2025-06-27

## 2025-07-07 RX ADMIN — TRIAMCINOLONE ACETONIDE 80 MG: 40 INJECTION, SUSPENSION INTRA-ARTICULAR; INTRAMUSCULAR at 09:37

## 2025-07-07 RX ADMIN — LIDOCAINE HYDROCHLORIDE 4 ML: 10 INJECTION, SOLUTION EPIDURAL; INFILTRATION; INTRACAUDAL; PERINEURAL at 09:37

## 2025-07-07 NOTE — PROGRESS NOTES
Subjective:     Patient ID: Lisa Gavin is a 74 y.o. female.    Chief Complaint:  Follow-up DJD left knee  CT completed  Follow-up DJD right shoulder  History of Present Illness  History of Present Illness  The patient is a 74-year-old female who presents to the clinic today for evaluation of her left knee. She has completed a CT scan and is here to discuss the results and further plan of care. She continues to exhibit pain along the medial compartment with swelling present, which prompted the CT scan. She received a corticosteroid injection at her last visit, which helped with symptom improvement. She reports a decrease in knee swelling. She has had another cardiac event and is waiting for further cardiac follow-up. She reports no other concerns at present. She is accompanied by her .  She is experiencing pain at the right shoulder last visit we did proceed with corticosteroid injection subacromial approach she is exhibiting increased pain at the anterior aspect of the shoulder.  Denies any other concerns present.    The patient reports a decrease in knee swelling following a corticosteroid injection administered during her last visit.         Social History     Occupational History    Occupation:    Tobacco Use    Smoking status: Never     Passive exposure: Never    Smokeless tobacco: Never   Vaping Use    Vaping status: Never Used   Substance and Sexual Activity    Alcohol use: No    Drug use: No    Sexual activity: Defer      Past Medical History:   Diagnosis Date    A-fib     Arthritis     Congestive heart failure (CHF)     Depression     Disease of thyroid gland     Elevated cholesterol     GERD (gastroesophageal reflux disease)     Headache     Hypertension     Kidney disease     Sleep apnea     Type 2 diabetes mellitus      Past Surgical History:   Procedure Laterality Date    CHOLECYSTECTOMY      COLONOSCOPY N/A 04/29/2022    Procedure: COLONOSCOPY;  Surgeon: Parmjit Traore  "MD Gera;  Location: [facility];  Service: Gastroenterology;  Laterality: N/A;  Diverticulosis    CORONARY STENT PLACEMENT      HYSTERECTOMY  1996    bilateral oophorectomy for heavy bleeding. No HRT    ROTATOR CUFF REPAIR      ACUTE    SHOULDER SURGERY Bilateral     hAS HAD ROTATOR CUFF SURGERY ON BOTH SHOULDERS       Family History   Problem Relation Age of Onset    Diabetes Mother     Kidney disease Father     Arthritis Father     Kidney cancer Father     Prostate cancer Father     Cancer Other     Diabetes Other     Glaucoma Other     Rheum arthritis Other     Kidney disease Other     Breast cancer Neg Hx                Objective:  Physical Exam  General: No acute distress.  Eyes: conjunctiva clear; pupils equally round and reactive  ENT: external ears and nose atraumatic; oropharynx clear  CV: no peripheral edema  Resp: normal respiratory effort  Skin: no rashes or wounds; normal turgor  Psych: mood and affect appropriate; recent and remote memory intact    Vitals:    07/07/25 0856   Weight: 80.7 kg (178 lb)   Height: 160 cm (62.99\")         07/07/25  0856   Weight: 80.7 kg (178 lb)     Body mass index is 31.54 kg/m².      Ortho Exam     Physical Exam  Examination of the left knee reveals palpable swelling and tenderness along the medial joint line. Positive crepitus throughout arc of motion. Moderate swelling is noted. No effusion is present. Knee range of motion is from 0 to 115 degrees. Stability to varus and valgus stress at zero and 30 degrees is observed. A 1+ anterior Lachman exam is noted. Positive medial Carla's exam, negative lateral Carla's exam.    Right shoulder examined  Active forward flexion 165 degrees external rotation 40 degrees maximal tenderness present anteriorly and humeral aspect  Shoulder abduction to 85 degrees internal/external rotation strength 4 out of 5 stability internal rotation to L4  Negative drop arm sign negative crossover exam  Imaging:    CT knee left wo " contrast  Result Date: 7/2/2025  Impression: 1.No evidence for displaced fracture or dislocation. 2.Advanced tricompartmental osteoarthritis of the left knee. There is associated marked loss of joint space height in the medial compartment with remodeling of the medial tibial plateau. No definitive displaced medial tibial plateau fracture is seen. No depressed fracture is seen. 3.Evidence for pes anserine bursitis at the medial aspect of the knee. A hematoma in this region is felt less likely. Recommend correlation with follow-up nonemergent MRI of the knee with gadolinium contrast for better characterization and to exclude abnormal enhancement. Electronically Signed: Felipe Harris MD  7/2/2025 1:35 PM EDT  Workstation ID: [id number]    Independently reviewed CT left knee no evidence of acute fracture advanced to the generation throughout the entire aspect of the medial compartment with remodeling at medial tibial plateau.  No evidence of a displaced medial tibial plateau fracture.  No evidence of depression of a fracture.  Pez anserine bursitis along the medial aspect of the knee.    Assessment:        1. Primary osteoarthritis of left knee    2. Pes anserinus bursitis of left knee    3. Subacromial bursitis of right shoulder joint    4. Right shoulder pain, unspecified chronicity         Assessment & Plan  1. Left knee pain.  A comprehensive discussion was held regarding potential treatment strategies. Given her current satisfactory condition, it was decided to postpone any further injections at this time. If symptoms recur, repeat corticosteroid injections can be considered until she is ready for surgical intervention. All queries were addressed.    PROCEDURE  A corticosteroid injection was administered during the last visit.    Plan:  - Large Joint Arthrocentesis: R glenohumeral on 7/7/2025 9:37 AM  Indications: pain  Details: 22 G needle, anterior approach  Medications: 4 mL lidocaine PF 1% 1 %; 80 mg  triamcinolone acetonide 40 MG/ML  Outcome: tolerated well, no immediate complications  Procedure, treatment alternatives, risks and benefits explained, specific risks discussed. Consent was given by the patient. Immediately prior to procedure a time out was called to verify the correct patient, procedure, equipment, support staff and site/side marked as required. Patient was prepped and draped in the usual sterile fashion.         Orders:  Orders Placed This Encounter   Procedures    - Large Joint Arthrocentesis: R glenohumeral     No orders of the defined types were placed in this encounter.        Dragon dictation utilized          Patient or patient representative verbalized consent for the use of Ambient Listening during the visit with  KAM Washington for chart documentation. 7/7/2025  16:46 EDT

## 2025-07-15 LAB
CV ZIO BASELINE AVG BPM: 69 BPM
CV ZIO BASELINE BPM HIGH: 182 BPM
CV ZIO BASELINE BPM LOW: 52 BPM
CV ZIO DEVICE ANALYSIS TIME: NORMAL
CV ZIO ECT SVE COUNT: NORMAL EPISODES
CV ZIO ECT SVE CPLT COUNT: 1265 EPISODES
CV ZIO ECT SVE CPLT FREQ: NORMAL
CV ZIO ECT SVE FREQ: NORMAL
CV ZIO ECT SVE TPLT COUNT: 174 EPISODES
CV ZIO ECT SVE TPLT FREQ: NORMAL
CV ZIO ECT VE COUNT: NORMAL EPISODES
CV ZIO ECT VE CPLT COUNT: 1614 EPISODES
CV ZIO ECT VE CPLT FREQ: NORMAL
CV ZIO ECT VE FREQ: NORMAL
CV ZIO ECT VE TPLT COUNT: 206 EPISODES
CV ZIO ECT VE TPLT FREQ: NORMAL
CV ZIO ECTOPIC SVE COUPLET RAW PERCENT: 0.2 %
CV ZIO ECTOPIC SVE ISOLATED PERCENT: 0.9 %
CV ZIO ECTOPIC SVE TRIPLET RAW PERCENT: 0.04 %
CV ZIO ECTOPIC VE COUPLET RAW PERCENT: 0.25 %
CV ZIO ECTOPIC VE ISOLATED PERCENT: 20.12 %
CV ZIO ECTOPIC VE TRIPLET RAW PERCENT: 0.05 %
CV ZIO ENROLLMENT END: NORMAL
CV ZIO ENROLLMENT START: NORMAL
CV ZIO L BIGEMINY DUR: 665.5 SEC
CV ZIO L BIGEMINY END: NORMAL
CV ZIO L BIGEMINY START: NORMAL
CV ZIO L TRIGEMINY DUR: 101.1 SEC
CV ZIO L TRIGEMINY END: NORMAL
CV ZIO L TRIGEMINY START: NORMAL
CV ZIO PATIENT EVENTS DIARIES: 0
CV ZIO PATIENT EVENTS TRIGGERS: 0
CV ZIO PAUSE COUNT: 0
CV ZIO PRESCRIPTION STATUS: NORMAL
CV ZIO SVT AVG BPM: 102 BPM
CV ZIO SVT BPM HIGH: 135 BPM
CV ZIO SVT BPM LOW: 85 BPM
CV ZIO SVT COUNT: 4
CV ZIO SVT F EPI AVG BPM: 120 BPM
CV ZIO SVT F EPI BEATS: 18 BEATS
CV ZIO SVT F EPI BPM HIGH: 135 BPM
CV ZIO SVT F EPI BPM LOW: 98 BPM
CV ZIO SVT F EPI DUR: 9.8 SEC
CV ZIO SVT F EPI END: NORMAL
CV ZIO SVT F EPI START: NORMAL
CV ZIO SVT L EPI AVG BPM: 120 BPM
CV ZIO SVT L EPI BEATS: 18 BEATS
CV ZIO SVT L EPI BPM HIGH: 135 BPM
CV ZIO SVT L EPI BPM LOW: 98 BPM
CV ZIO SVT L EPI DUR: 9.8 SEC
CV ZIO SVT L EPI END: NORMAL
CV ZIO SVT L EPI START: NORMAL
CV ZIO TOTAL  ENROLLMENT PERIOD: NORMAL
CV ZIO VT AVG BPM: 121 BPM
CV ZIO VT BPM HIGH: 182 BPM
CV ZIO VT BPM LOW: 73 BPM
CV ZIO VT COUNT: 3
CV ZIO VT F EPI AVG BPM: 144
CV ZIO VT F EPI BEATS: 7 BEATS
CV ZIO VT F EPI BPM HIGH: 182
CV ZIO VT F EPI BPM LOW: 106
CV ZIO VT F EPI DUR: 3.1 SEC
CV ZIO VT F EPI END: NORMAL
CV ZIO VT F EPI START: NORMAL
CV ZIO VT L EPI AVG BPM: 144
CV ZIO VT L EPI BEATS: 7 BEATS
CV ZIO VT L EPI BPM HIGH: 182 BPM
CV ZIO VT L EPI BPM LOW: 106 BPM
CV ZIO VT L EPI DUR: 3.1
CV ZIO VT L EPI END: NORMAL
CV ZIO VT L EPI START: NORMAL

## 2025-07-21 ENCOUNTER — OFFICE VISIT (OUTPATIENT)
Age: 75
End: 2025-07-21
Payer: MEDICARE

## 2025-07-21 VITALS
WEIGHT: 180 LBS | BODY MASS INDEX: 33.13 KG/M2 | HEIGHT: 62 IN | SYSTOLIC BLOOD PRESSURE: 130 MMHG | HEART RATE: 71 BPM | DIASTOLIC BLOOD PRESSURE: 72 MMHG

## 2025-07-21 DIAGNOSIS — I48.0 PAROXYSMAL ATRIAL FIBRILLATION: Chronic | ICD-10-CM

## 2025-07-21 DIAGNOSIS — Z79.4 TYPE 2 DIABETES MELLITUS WITHOUT COMPLICATION, WITH LONG-TERM CURRENT USE OF INSULIN: ICD-10-CM

## 2025-07-21 DIAGNOSIS — E11.9 TYPE 2 DIABETES MELLITUS WITHOUT COMPLICATION, WITH LONG-TERM CURRENT USE OF INSULIN: ICD-10-CM

## 2025-07-21 DIAGNOSIS — E78.5 HYPERLIPIDEMIA, UNSPECIFIED HYPERLIPIDEMIA TYPE: ICD-10-CM

## 2025-07-21 DIAGNOSIS — G47.33 OSA ON CPAP: Primary | ICD-10-CM

## 2025-07-21 DIAGNOSIS — G47.33 OSA ON CPAP: ICD-10-CM

## 2025-07-21 NOTE — PROGRESS NOTES
CARDIOLOGY        Patient Name: Lisa Gavin  :1950  Age: 74 y.o.  Primary Cardiologist: Chacho Paiz MD  Encounter Provider:  Rudi Mix PA-C    Date of Service: 25            CHIEF COMPLAINT / REASON FOR OFFICE VISIT     Hospital follow-up      HISTORY OF PRESENT ILLNESS       HPI  Lisa Gavin is a 74 y.o. female who presents today for hospital follow-up.     Pt has a  history significant for paroxysmal atrial fibrillation, diastolic heart failure, CKD, diabetes, IBS, obesity, and KRISTIN on CPAP presents for hospital follow-up.  Patient was admitted on 2025 through 2025 for dizziness.  She denied any chest pain or palpitation.  No syncope.  She did start a new antidepressant 2 days prior.  She had been off her Xarelto for 6 months due to unable to afford it.  She was taking her Plavix and aspirin.  She had a workup in the ER including labs.  Her EKG showed bigeminy with some couplet PVCs.  Cardiology was consulted.  Patient had echocardiogram that showed a EF of 58.2%, moderately dilated LA, moderate MR, mildly elevated RVSP, showing slightly worse mitral regurgitation since echo 2022.  Her flecainide was stopped due to her history of ischemic heart disease and was sent home on a Zio patch.  Patient was started on Eliquis along with aspirin.  Patient was to follow-up in 1 month.    Patient's monitor did not reveal any evidence of return of atrial fibrillation.  There were few different premature heartbeats but nothing sustained.  She was to continue her current cardiac regimen.    Patient has been doing well since her discharge from the hospital.  She has not had any recurrence of her dizziness.  She occasionally has some shortness of breath when she is rushed.  She otherwise has no chest pain, palpitations, edema to her legs, orthopnea, or any bleeding issues.  She had no issues with her Eliquis.  She did recently run out but no issues coming in today for  "appointment.      Below is a brief summary of patient's pertinent cardiac history  Patient had a cardiac cath on 7/23/2024 with PCI of the proximal first diagonal and mid left circumflex and her flecainide was discontinued.     She was followed by heart rhythm center and was last seen on 8/5/2024.  She had an echo in February 2024 that showed EF of 50 to 55%, mildly dilated LV, mild MR and trace to mild TR.     Patient was last seen by [facility] heart specialist on 11/22/2024 for follow-up.  Patient was doing well from a cardiac standpoint.  A little shortness of breath but states that she has been fighting an upper respiratory infection.  She denied chest pain, palpitations, edema, syncope or dizziness.  Patient remains on low-dose Xarelto and denies any bleeding or falls.  Patient was noted to have PVCs on EKG.  Patient on metoprolol 50 mg twice daily.  No medication changes were made     The following portions of the patient's history were reviewed and updated as appropriate: allergies, current medications, past family history, past medical history, past social history, past surgical history and problem list.      VITAL SIGNS     Visit Vitals  /72 (BP Location: Left arm, Patient Position: Sitting, Cuff Size: Adult)   Pulse 71   Ht 157.5 cm (62\")   Wt 81.6 kg (180 lb)   BMI 32.92 kg/m²       @[email or web address]  Wt Readings from Last 3 Encounters:   07/21/25 81.6 kg (180 lb)   07/07/25 80.7 kg (178 lb)   06/18/25 81 kg (178 lb 9.2 oz)     Body mass index is 32.92 kg/m².        PHYSICAL EXAMINATION     Constitutional:       General: Awake. Not in acute distress.     Appearance: Not in distress.   Pulmonary:      Effort: Pulmonary effort is normal.      Breath sounds: Normal breath sounds.   Cardiovascular:      Normal rate. Occasional ectopic beats. Regular rhythm.   Edema:     Peripheral edema absent.   Skin:     General: Skin is warm.   Neurological:      Mental Status: Alert.   Psychiatric:         " Behavior: Behavior is cooperative.           REVIEWED DATA     Procedures    Cardiac Procedures:      Holter monitor on 7/15/2025    An abnormal monitor study.    Normal sinus rhythm with rare PACs and frequent (20%) PVCs.  There were 3 bursts of NSVT and 4 bursts of paroxysmal SVT.    Transthoracic echo on 6/18/2025     Left ventricular systolic function is normal. Calculated left ventricular EF = 58.2%    Left ventricular diastolic function was normal.    The left atrial cavity is moderately dilated.    Moderate mitral valve regurgitation is present.    Estimated right ventricular systolic pressure from tricuspid regurgitation is mildly elevated (35-45 mmHg). Calculated right ventricular systolic pressure from tricuspid regurgitation is 37 mmHg.    Compared to echocardiogram 11/18/2022, the mitral regurgitation is worse.      CATH 7/23/24     MPRESSION   ---------------------------------------------------------------------------     1. Exertional dyspnea/chest pressure consistent with angina pectoris for 6   months, class III   2. Moderate risk myocardial perfusion PET scan, abnormal   3. Chronic HFpEF   4. Elevated LVEDP 22   5. Hypertensive heart disease   6. No aortic valvular gradient pullback   7. Two-vessel coronary artery disease   8. Successful two-vessel PCI as above   9. preoperative cardiac evaluation   10. CKD 3   11. Diabetes   12. Hyperlipidemia   13. PAF   14. Chronic flecainide therapy   15. Chronic anticoagulation     ---------------------------------------------------------------------------   RECOMMENDATIONS   ---------------------------------------------------------------------------     1. Resume Xarelto at discharge   2. Aspirin for 1 week and then discontinue   3. Clopidogrel for 1 year   4. Would recommend delaying knee and bladder surgery for 4 months   5. Once stopping Plavix and Xarelto for surgery, would add back aspirin during   surgery temporarily to cover for stent   6. discontinue  "flecainide   7. General post PCI care per protocol   8. I discussed importance of adhering to antiplatelet therapy post cardiac   stenting. We discussed the risk of subacute stent thrombosis, myocardial   infarction, and death if the DAPT is not taken regularly. The patient   expressed good understanding.   9. Cardiac rehabilitation referral   10. Aggressive secondary prevention, risk factor modification, and medical   therapy. This would include a target HDL goal >40, LDL <70 in addition to a   heart healthy diet, and a goal of 30\" of routine aerobic activity 5-7 days a   week.   11. Empiric beta blocker, statin   12. JEOVANNY prophylaxis   13. Discharge in am if stable         ECHO 2/21/24     Summary:                 Mildly dilated left ventricle.                            The left ventricular systolic function is at the lower limits of normal.                            The estimated ejection fraction is 50-55%.                            There is mild to moderate left atrial enlargement.                            Mild mitral regurgitation is present.                            Trace-to-mild tricuspid regurgitation.                            The right ventricular systolic pressure is estimated to be 25-30 mmHg.            ECHO 11/18/22       Left ventricular systolic function is normal. Calculated left ventricular EF = 60.2%    Left ventricular diastolic function was normal.    Estimated right ventricular systolic pressure from tricuspid regurgitation is moderately elevated (45-55 mmHg). Calculated right ventricular systolic pressure from tricuspid regurgitation is 48 mmHg.    Mild to moderate pulmonary hypertension is present.        Stress test  2/21/20     Summary    Normal pharmacological stress SPECT Myocardial Perfusion Imaging.    No evidence of stress induced myocardial ischemia or infarction.    Diaphragmatic attenuation artifact.      Summary of LV Function    Normal LV systolic function.     Lipid Panel "          12/4/2024    11:03   Lipid Panel   Total Cholesterol 119       HDL Cholesterol 62       LDL Cholesterol  41          Details          This result is from an external source.               Lab Results   Component Value Date     06/18/2025     06/17/2025    K 4.5 06/18/2025    K 4.5 06/17/2025     (H) 06/18/2025     06/17/2025    CO2 22.7 06/18/2025    CO2 21.0 (L) 06/17/2025    BUN 42.5 (H) 06/18/2025    BUN 49.6 (H) 06/17/2025    CREATININE 1.49 (H) 06/18/2025    CREATININE 1.85 (H) 06/17/2025    EGFRIFNONA 37 (L) 02/07/2022    EGFRIFNONA 53 (L) 09/22/2021    EGFRIFAFRI 42 (L) 02/07/2022    EGFRIFAFRI 64 09/22/2021    GLUCOSE 162 (H) 06/18/2025    GLUCOSE 96 06/17/2025    CALCIUM 9.0 06/18/2025    CALCIUM 9.6 06/17/2025    ALBUMIN 3.8 06/17/2025    ALBUMIN 3.8 02/12/2025    BILITOT 0.2 06/17/2025    BILITOT 0.2 02/12/2025    AST 17 06/17/2025    AST 13 02/12/2025    ALT 13 06/17/2025    ALT 6 02/12/2025     Lab Results   Component Value Date    WBC 5.33 06/18/2025    WBC 6.73 06/17/2025    HGB 9.7 (L) 06/18/2025    HGB 10.0 (L) 06/17/2025    HCT 28.9 (L) 06/18/2025    HCT 29.5 (L) 06/17/2025    MCV 98.6 (H) 06/18/2025    MCV 98.3 (H) 06/17/2025     06/18/2025     06/17/2025     Lab Results   Component Value Date    PROBNP 1,277.0 (H) 06/17/2025    PROBNP 2,962.0 (H) 02/18/2023    .1 10/18/2023     Lab Results   Component Value Date    TROPONINT 23 (H) 06/18/2025     Lab Results   Component Value Date    TSH 1.990 06/17/2025    TSH 2.4 06/02/2025             ASSESSMENT & PLAN     Diagnoses and all orders for this visit:      Bigeminy/frequent PVCs: Improved now that electrolytes have been replaced.  I suspect that this was worsened due to hypomagnesemia in the setting of diarrhea and GI losses  Continue home dose metoprolol.   Holter monitor 7/15/25 did not reveal any evidence of return of atrial fibrillation. There were a few different premature heartbeats but  nothing sustained to suggest atrial fibrillation   Atrial fibrillation, paroxysmal: Flecainide recently stopped in the hospital.  Continue Eliquis 5 twice daily, and continue aspirin monotherapy per above.  She is agreeable to continue her Eliquis and will help with cost by given samples in the office.  Patient given samples in the office today.  Coronary artery disease, status post PCI July 2024, PCI to first diagonal 3.0 x 24 Synergy NELIA, postdilated 3.75, PCI to mid circumflex 2.5 x 38 NELIA, postdilated 3.0.: I think we can drop the Plavix and continue aspirin and Eliquis dual therapy.  She does not have angina.  Reasonable to continue Imdur, although not sure if she is having angina  Continue asa and eliquis   HFpEF with elevated LVEDP.  Agreeable to start Jardiance.  Given sick precautions along with side effects.  Continue home dose of furosemide  CKD 3A.  Another reason to consider SGLT2 inhibition.  Hypertension.  Continue home doses of metoprolol and Lasix.  DM2: Managed by PCP     Patient been doing well from her discharge from the hospital.  Spoke to her about her echocardiogram results.  She is agreeable to continue taking her Eliquis and if called in this to her pharmacy.  I did speak to her about recommendations for starting Jardiance.  I spoke to her about indications along with sick precautions.  She is agreeable to start this.  She will come back in 1 month for recheck along with same-day labs.  Patient is to call the office with any questions or concerns.      Return in about 4 weeks (around 8/18/2025) for Dr. Paiz.    Future Appointments         Provider Department Center    9/2/2025 8:45 AM (Arrive by 8:30 AM) Maryanne Berrios APRN [facility] ORTHOPEDICS [facility]    9/3/2025 1:00 PM (Arrive by 12:45 PM) Chacho Paiz MD [facility] CARDIOLOGY [facility]    9/12/2025 9:00 AM (Arrive by 8:45 AM) Stiven Godinez MD [facility] SLEEP MEDICINE                  MEDICATIONS         Discharge Medications            Accurate as of July 21, 2025  1:47 PM. If you have any questions, ask your nurse or doctor.                New Medications        Instructions Start Date   apixaban 5 MG tablet tablet  Commonly known as: ELIQUIS  Started by: Rudi Peraltasteadt   5 mg, Oral, 2 Times Daily      empagliflozin 10 MG tablet tablet  Commonly known as: Jardiance  Started by: Rudi Liztlesteadt   10 mg, Oral, Daily             Continue These Medications        Instructions Start Date   alendronate 70 MG tablet  Commonly known as: FOSAMAX   70 mg      allopurinol 100 MG tablet  Commonly known as: ZYLOPRIM   TAKE 1 TABLET BY MOUTH 1 TIME EACH DAY.      atorvastatin 20 MG tablet  Commonly known as: LIPITOR   20 mg, Nightly      Calcium Carbonate+Vitamin D 600-200 MG-UNIT tablet  Commonly known as: RA Calcium Plus Vitamin D   1 tablet, Oral, 2 Times Daily      DULoxetine 30 MG capsule  Commonly known as: CYMBALTA   30 mg, Daily      furosemide 40 MG tablet  Commonly known as: LASIX   40 mg, 2 Times Daily      glyBURIDE-metFORMIN 5-500 MG per tablet  Commonly known as: GLUCOVANCE   2 tablets, 2 Times Daily      isosorbide mononitrate 30 MG 24 hr tablet  Commonly known as: IMDUR   30 mg, Daily      methIMAzole 5 MG tablet  Commonly known as: TAPAZOLE   TAKE ONE TABLET BY MOUTH DAILY      metoprolol tartrate 50 MG tablet  Commonly known as: LOPRESSOR   50 mg, 2 Times Daily      omeprazole 40 MG capsule  Commonly known as: priLOSEC   40 mg, Daily      Vitamin D 50 MCG (2000 UT) tablet   TAKE 1 TABLET BY MOUTH EVERY DAY                   **Dragon Disclaimer:   Much of this encounter note is an electronic transcription/translation of spoken language to printed text. The electronic translation of spoken language may permit erroneous, or at times, nonsensical words or phrases to be inadvertently transcribed. Although I have reviewed the note for such errors, some may still exist.

## (undated) DEVICE — KT ORCA ORCAPOD DISP STRL

## (undated) DEVICE — SAFELINER SUCTION CANISTER 1000CC: Brand: DEROYAL

## (undated) DEVICE — ADAPT CLN BIOGUARD AIR/H2O DISP

## (undated) DEVICE — Device

## (undated) DEVICE — BW-412T DISP COMBO CLEANING BRUSH: Brand: SINGLE USE COMBINATION CLEANING BRUSH

## (undated) DEVICE — JACKT LAB F/R KNIT CUFF/COLR XLG BLU

## (undated) DEVICE — SPNG GZ WOVN 4X4IN 12PLY 10/BX STRL

## (undated) DEVICE — GLV SURG SENSICARE PI MIC PF SZ7.5 LF STRL